# Patient Record
Sex: FEMALE | Race: WHITE | NOT HISPANIC OR LATINO | URBAN - METROPOLITAN AREA
[De-identification: names, ages, dates, MRNs, and addresses within clinical notes are randomized per-mention and may not be internally consistent; named-entity substitution may affect disease eponyms.]

---

## 2022-05-16 ENCOUNTER — OUTPATIENT (OUTPATIENT)
Dept: OUTPATIENT SERVICES | Facility: HOSPITAL | Age: 77
LOS: 1 days | Discharge: HOME | End: 2022-05-16
Payer: MEDICARE

## 2022-05-16 DIAGNOSIS — N13.9 OBSTRUCTIVE AND REFLUX UROPATHY, UNSPECIFIED: ICD-10-CM

## 2022-05-16 PROCEDURE — 76770 US EXAM ABDO BACK WALL COMP: CPT | Mod: 26

## 2023-01-06 ENCOUNTER — INPATIENT (INPATIENT)
Facility: HOSPITAL | Age: 78
LOS: 19 days | Discharge: NURSING, REHAB OR LONG-TERM CARE | End: 2023-01-26
Attending: STUDENT IN AN ORGANIZED HEALTH CARE EDUCATION/TRAINING PROGRAM | Admitting: STUDENT IN AN ORGANIZED HEALTH CARE EDUCATION/TRAINING PROGRAM
Payer: MEDICARE

## 2023-01-06 VITALS
SYSTOLIC BLOOD PRESSURE: 114 MMHG | HEART RATE: 141 BPM | TEMPERATURE: 98 F | OXYGEN SATURATION: 99 % | RESPIRATION RATE: 18 BRPM | DIASTOLIC BLOOD PRESSURE: 73 MMHG

## 2023-01-06 LAB
ALBUMIN SERPL ELPH-MCNC: 4.1 G/DL — SIGNIFICANT CHANGE UP (ref 3.5–5.2)
ALBUMIN SERPL ELPH-MCNC: 4.4 G/DL — SIGNIFICANT CHANGE UP (ref 3.5–5.2)
ALP SERPL-CCNC: 129 U/L — HIGH (ref 30–115)
ALP SERPL-CCNC: 140 U/L — HIGH (ref 30–115)
ALT FLD-CCNC: 35 U/L — SIGNIFICANT CHANGE UP (ref 0–41)
ALT FLD-CCNC: 37 U/L — SIGNIFICANT CHANGE UP (ref 0–41)
ANION GAP SERPL CALC-SCNC: 10 MMOL/L — SIGNIFICANT CHANGE UP (ref 7–14)
ANION GAP SERPL CALC-SCNC: 11 MMOL/L — SIGNIFICANT CHANGE UP (ref 7–14)
AST SERPL-CCNC: 34 U/L — SIGNIFICANT CHANGE UP (ref 0–41)
AST SERPL-CCNC: 75 U/L — HIGH (ref 0–41)
BASE EXCESS BLDV CALC-SCNC: -4.8 MMOL/L — LOW (ref -2–3)
BASOPHILS # BLD AUTO: 0.06 K/UL — SIGNIFICANT CHANGE UP (ref 0–0.2)
BASOPHILS NFR BLD AUTO: 0.7 % — SIGNIFICANT CHANGE UP (ref 0–1)
BILIRUB SERPL-MCNC: 0.6 MG/DL — SIGNIFICANT CHANGE UP (ref 0.2–1.2)
BILIRUB SERPL-MCNC: 0.7 MG/DL — SIGNIFICANT CHANGE UP (ref 0.2–1.2)
BUN SERPL-MCNC: 31 MG/DL — HIGH (ref 10–20)
BUN SERPL-MCNC: 32 MG/DL — HIGH (ref 10–20)
CA-I SERPL-SCNC: 1.19 MMOL/L — SIGNIFICANT CHANGE UP (ref 1.15–1.33)
CALCIUM SERPL-MCNC: 9.2 MG/DL — SIGNIFICANT CHANGE UP (ref 8.4–10.4)
CALCIUM SERPL-MCNC: 9.4 MG/DL — SIGNIFICANT CHANGE UP (ref 8.4–10.5)
CHLORIDE SERPL-SCNC: 102 MMOL/L — SIGNIFICANT CHANGE UP (ref 98–110)
CHLORIDE SERPL-SCNC: 102 MMOL/L — SIGNIFICANT CHANGE UP (ref 98–110)
CO2 SERPL-SCNC: 20 MMOL/L — SIGNIFICANT CHANGE UP (ref 17–32)
CO2 SERPL-SCNC: 22 MMOL/L — SIGNIFICANT CHANGE UP (ref 17–32)
CREAT SERPL-MCNC: 1.2 MG/DL — SIGNIFICANT CHANGE UP (ref 0.7–1.5)
CREAT SERPL-MCNC: 1.2 MG/DL — SIGNIFICANT CHANGE UP (ref 0.7–1.5)
EGFR: 47 ML/MIN/1.73M2 — LOW
EGFR: 47 ML/MIN/1.73M2 — LOW
EOSINOPHIL # BLD AUTO: 0.1 K/UL — SIGNIFICANT CHANGE UP (ref 0–0.7)
EOSINOPHIL NFR BLD AUTO: 1.1 % — SIGNIFICANT CHANGE UP (ref 0–8)
GAS PNL BLDV: 129 MMOL/L — LOW (ref 136–145)
GAS PNL BLDV: SIGNIFICANT CHANGE UP
GAS PNL BLDV: SIGNIFICANT CHANGE UP
GLUCOSE SERPL-MCNC: 118 MG/DL — HIGH (ref 70–99)
GLUCOSE SERPL-MCNC: 120 MG/DL — HIGH (ref 70–99)
HCO3 BLDV-SCNC: 22 MMOL/L — SIGNIFICANT CHANGE UP (ref 22–29)
HCT VFR BLD CALC: 44.9 % — SIGNIFICANT CHANGE UP (ref 37–47)
HCT VFR BLDA CALC: 45 % — SIGNIFICANT CHANGE UP (ref 39–51)
HGB BLD CALC-MCNC: 15.1 G/DL — SIGNIFICANT CHANGE UP (ref 12.6–17.4)
HGB BLD-MCNC: 14.5 G/DL — SIGNIFICANT CHANGE UP (ref 12–16)
IMM GRANULOCYTES NFR BLD AUTO: 0.3 % — SIGNIFICANT CHANGE UP (ref 0.1–0.3)
LACTATE BLDV-MCNC: 2 MMOL/L — SIGNIFICANT CHANGE UP (ref 0.5–2)
LYMPHOCYTES # BLD AUTO: 1.2 K/UL — SIGNIFICANT CHANGE UP (ref 1.2–3.4)
LYMPHOCYTES # BLD AUTO: 13.1 % — LOW (ref 20.5–51.1)
MAGNESIUM SERPL-MCNC: 2.1 MG/DL — SIGNIFICANT CHANGE UP (ref 1.8–2.4)
MCHC RBC-ENTMCNC: 28.9 PG — SIGNIFICANT CHANGE UP (ref 27–31)
MCHC RBC-ENTMCNC: 32.3 G/DL — SIGNIFICANT CHANGE UP (ref 32–37)
MCV RBC AUTO: 89.6 FL — SIGNIFICANT CHANGE UP (ref 81–99)
MONOCYTES # BLD AUTO: 1.25 K/UL — HIGH (ref 0.1–0.6)
MONOCYTES NFR BLD AUTO: 13.6 % — HIGH (ref 1.7–9.3)
NEUTROPHILS # BLD AUTO: 6.52 K/UL — HIGH (ref 1.4–6.5)
NEUTROPHILS NFR BLD AUTO: 71.2 % — SIGNIFICANT CHANGE UP (ref 42.2–75.2)
NRBC # BLD: 0 /100 WBCS — SIGNIFICANT CHANGE UP (ref 0–0)
NT-PROBNP SERPL-SCNC: 5671 PG/ML — HIGH (ref 0–300)
PCO2 BLDV: 44 MMHG — HIGH (ref 39–42)
PH BLDV: 7.3 — LOW (ref 7.32–7.43)
PLATELET # BLD AUTO: 220 K/UL — SIGNIFICANT CHANGE UP (ref 130–400)
PO2 BLDV: 37 MMHG — SIGNIFICANT CHANGE UP
POTASSIUM BLDV-SCNC: 7.2 MMOL/L — CRITICAL HIGH (ref 3.5–5.1)
POTASSIUM SERPL-MCNC: 4.2 MMOL/L — SIGNIFICANT CHANGE UP (ref 3.5–5)
POTASSIUM SERPL-MCNC: SIGNIFICANT CHANGE UP MMOL/L (ref 3.5–5)
POTASSIUM SERPL-SCNC: 4.2 MMOL/L — SIGNIFICANT CHANGE UP (ref 3.5–5)
POTASSIUM SERPL-SCNC: SIGNIFICANT CHANGE UP MMOL/L (ref 3.5–5)
PROT SERPL-MCNC: 6.8 G/DL — SIGNIFICANT CHANGE UP (ref 6–8)
PROT SERPL-MCNC: 7.2 G/DL — SIGNIFICANT CHANGE UP (ref 6–8)
RBC # BLD: 5.01 M/UL — SIGNIFICANT CHANGE UP (ref 4.2–5.4)
RBC # FLD: 16.1 % — HIGH (ref 11.5–14.5)
SAO2 % BLDV: 58.8 % — SIGNIFICANT CHANGE UP
SARS-COV-2 RNA SPEC QL NAA+PROBE: SIGNIFICANT CHANGE UP
SODIUM SERPL-SCNC: 132 MMOL/L — LOW (ref 135–146)
SODIUM SERPL-SCNC: 135 MMOL/L — SIGNIFICANT CHANGE UP (ref 135–146)
TROPONIN T SERPL-MCNC: <0.01 NG/ML — SIGNIFICANT CHANGE UP
WBC # BLD: 9.16 K/UL — SIGNIFICANT CHANGE UP (ref 4.8–10.8)
WBC # FLD AUTO: 9.16 K/UL — SIGNIFICANT CHANGE UP (ref 4.8–10.8)

## 2023-01-06 PROCEDURE — 93010 ELECTROCARDIOGRAM REPORT: CPT

## 2023-01-06 PROCEDURE — 99223 1ST HOSP IP/OBS HIGH 75: CPT

## 2023-01-06 PROCEDURE — 99285 EMERGENCY DEPT VISIT HI MDM: CPT

## 2023-01-06 PROCEDURE — 93010 ELECTROCARDIOGRAM REPORT: CPT | Mod: 77

## 2023-01-06 PROCEDURE — 71045 X-RAY EXAM CHEST 1 VIEW: CPT | Mod: 26

## 2023-01-06 RX ORDER — LEVETIRACETAM 250 MG/1
500 TABLET, FILM COATED ORAL ONCE
Refills: 0 | Status: DISCONTINUED | OUTPATIENT
Start: 2023-01-06 | End: 2023-01-06

## 2023-01-06 RX ORDER — MAGNESIUM SULFATE 500 MG/ML
2 VIAL (ML) INJECTION ONCE
Refills: 0 | Status: COMPLETED | OUTPATIENT
Start: 2023-01-06 | End: 2023-01-06

## 2023-01-06 RX ORDER — DILTIAZEM HCL 120 MG
20 CAPSULE, EXT RELEASE 24 HR ORAL ONCE
Refills: 0 | Status: COMPLETED | OUTPATIENT
Start: 2023-01-06 | End: 2023-01-06

## 2023-01-06 RX ORDER — CITALOPRAM 10 MG/1
20 TABLET, FILM COATED ORAL DAILY
Refills: 0 | Status: DISCONTINUED | OUTPATIENT
Start: 2023-01-06 | End: 2023-01-26

## 2023-01-06 RX ORDER — LEVETIRACETAM 250 MG/1
500 TABLET, FILM COATED ORAL
Refills: 0 | Status: DISCONTINUED | OUTPATIENT
Start: 2023-01-06 | End: 2023-01-26

## 2023-01-06 RX ORDER — FUROSEMIDE 40 MG
40 TABLET ORAL ONCE
Refills: 0 | Status: COMPLETED | OUTPATIENT
Start: 2023-01-06 | End: 2023-01-06

## 2023-01-06 RX ORDER — METOPROLOL TARTRATE 50 MG
5 TABLET ORAL ONCE
Refills: 0 | Status: COMPLETED | OUTPATIENT
Start: 2023-01-06 | End: 2023-01-06

## 2023-01-06 RX ORDER — CEFTRIAXONE 500 MG/1
1000 INJECTION, POWDER, FOR SOLUTION INTRAMUSCULAR; INTRAVENOUS ONCE
Refills: 0 | Status: COMPLETED | OUTPATIENT
Start: 2023-01-06 | End: 2023-01-06

## 2023-01-06 RX ORDER — AZITHROMYCIN 500 MG/1
500 TABLET, FILM COATED ORAL ONCE
Refills: 0 | Status: COMPLETED | OUTPATIENT
Start: 2023-01-06 | End: 2023-01-06

## 2023-01-06 RX ADMIN — CEFTRIAXONE 100 MILLIGRAM(S): 500 INJECTION, POWDER, FOR SOLUTION INTRAMUSCULAR; INTRAVENOUS at 16:34

## 2023-01-06 RX ADMIN — Medication 25 GRAM(S): at 17:47

## 2023-01-06 RX ADMIN — Medication 20 MILLIGRAM(S): at 17:46

## 2023-01-06 RX ADMIN — Medication 40 MILLIGRAM(S): at 15:35

## 2023-01-06 RX ADMIN — AZITHROMYCIN 255 MILLIGRAM(S): 500 TABLET, FILM COATED ORAL at 16:34

## 2023-01-06 NOTE — ED PROVIDER NOTE - OBJECTIVE STATEMENT
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 Denies fevers, chills, chest pain, abdominal pain, headache, dizziness, nausea, vomiting. Patient was seen by PMD today and send to ED for new onset afib.

## 2023-01-06 NOTE — ED PROVIDER NOTE - CLINICAL SUMMARY MEDICAL DECISION MAKING FREE TEXT BOX
77-year-old female past medical history of CVA, aphasic at baseline cannot provide detailed history, seizure prophylaxis (on phenytoin and Keppra), high blood pressure presents with generalized weakness over the past 4 days associated with shortness of breath and lower extremity bilateral worsening edema.  Per family at bedside on Tuesday approximately 4 days ago they went to check up on the patient as she was having nonbloody diarrhea and noticed she was short of breath spoke to the primary care doctor that they followed up with today and had an EKG done that showed new onset atrial fibrillation and was sent to the emergency department.  Unable to obtain detailed ROS from patient due to history of CVA.    Vital Signs: I have reviewed the initial vital signs. Constitutional: Non toxic appearing pt sitting on stretcher speaking full sentences. Integumentary: No rash. ENT: MMM NECK: Supple, non-tender, no meningeal signs. Cardiovascular: Irregular, irregular, radial pulses 2/4 b/l. (+) JVD. Respiratory: BS present b/l decreased to R lower lung base, crackles present b/l worse to lung bases,  no accessory muscle use, no stridor. Saturation 99/100 on RA, placed on NC for relief as pt reports SOB. Gastrointestinal: BS present throughout all 4 quadrants, soft, nd, nt, no rebound tenderness or guarding, no cvat. Musculoskeletal: FROM, 2+ pitting edema, no calf pain/erythema. Neurologic: Awake and alert, no acute focal deficits.    Labs and EKG were ordered and reviewed.  Imaging was ordered and reviewed by me.  fluid overload on xray with clinical signs of LE edema, lasix given, in addition concern for RLL infiltrate on cxr, iv abx given. Appropriate medications for patient's presenting complaints were ordered and effects were reassessed. Treated for atrial fibrillation as well, underlying causes addressed, HR continued to be in 120-130's rapid afib, Cardizem given with response. Patient with no previous records (prior hospital, ED visit).  Additional history was obtained from family.  Escalation to admission/observation was considered. Patient requires inpatient hospitalization - monitored setting.

## 2023-01-06 NOTE — H&P ADULT - HISTORY OF PRESENT ILLNESS
77-year-old female past medical history of CVA, aphasic at baseline cannot provide detailed history, seizure prophylaxis (on phenytoin and Keppra), high blood pressure presents with generalized weakness over the past 4 days associated with shortness of breath and lower extremity bilateral worsening edema.  Per family at bedside on Tuesday approximately 4 days ago they went to check up on the patient as she was having nonbloody diarrhea and noticed she was short of breath spoke to the primary care doctor that they followed up with today and had an EKG done that showed new onset atrial fibrillation and was sent to the emergency department.  Unable to obtain detailed ROS from patient due to history of CVA.    ED vitals:  /73, , Temp 98, satting 99% on 3L NC  Trop neg, BNP 5671  EKG Afib w/ RVR  CXR opacities, effusion    s/p Rocephin, Azithromycin, Lasix 40mg IV x1, Cardizem 20mg IV x1 in the ED.  Admitted for new onset fib. 77-year-old female past medical history of CVA, aphasic at baseline cannot provide detailed history, seizure prophylaxis (on phenytoin and Keppra), high blood pressure presents with generalized weakness over the past 4 days associated with shortness of breath and lower extremity bilateral worsening edema.  Per family at bedside on Tuesday approximately 4 days ago they went to check up on the patient as she was having nonbloody diarrhea and noticed she was short of breath spoke to the primary care doctor that they followed up with today and had an EKG done that showed new onset atrial fibrillation and was sent to the emergency department.     ED vitals:  /73, , Temp 98, satting 99% on 3L NC  Trop neg, BNP 5671  EKG Afib w/ RVR  CXR opacities, effusion    s/p Rocephin, Azithromycin, Lasix 40mg IV x1, Cardizem 20mg IV x1 in the ED.  Admitted for new onset fib.

## 2023-01-06 NOTE — ED PROVIDER NOTE - PHYSICAL EXAMINATION
CONSTITUTIONAL: elderly, ill-appearing  SKIN: warm, dry  HEAD: Normocephalic;   EYES: no conjunctival injection  CARD: S1, S2 normal; Regular rate and rhythm.   RESP: +decreased breath sounds RLL. +tachypneic  ABD: soft ntnd  EXT: + b/l LE pitting edema. moves all extremities  NEURO: Alert, oriented, grossly unremarkable.  PSYCH: Cooperative, appropriate.

## 2023-01-06 NOTE — ED ADULT NURSE NOTE - NSIMPLEMENTINTERV_GEN_ALL_ED
Implemented All Fall with Harm Risk Interventions:  [place] to call system. Call bell, personal items and telephone within reach. Instruct patient to call for assistance. Room bathroom lighting operational. Non-slip footwear when patient is off stretcher. Physically safe environment: no spills, clutter or unnecessary equipment. Stretcher in lowest position, wheels locked, appropriate side rails in place. Provide visual cue, wrist band, yellow gown, etc. Monitor gait and stability. Monitor for mental status changes and reorient to person, place, and time. Review medications for side effects contributing to fall risk. Reinforce activity limits and safety measures with patient and family. Provide visual clues: red socks.

## 2023-01-06 NOTE — H&P ADULT - ASSESSMENT
77-year-old female past medical history of CVA, aphasic at baseline cannot provide detailed history, seizure prophylaxis (on phenytoin and Keppra), high blood pressure presents with generalized weakness over the past 4 days associated with shortness of breath and lower extremity bilateral worsening edema. Admitted for new onset Afib.    #CHF exacerbation  - EKG afib w/ RVR  - Trop neg, BNP 5671  - CXR opacities, effusion  - on 2L NC  - Lasix 40mg IV qdaily  - check TTE  - monitor I/O's, daily weight, fluid restriction  - cardiology eval    #New onset Afib  - EKG Afib w/ RVR  - s/p diltiazem IV x1, rate uncontrolled  - Lopressor 5mg IV now, transition to metoprolol PO  - full AC    #Seizures  - c/w Keppra 500mg BID, phenytoin 100mg TID    #Anxiety/Depression  - c/w citalopram 20mg daily    #HTN  - previously on cadesartan/HCTZ, labetalol, hydralazine, has not recently been on meds  - monitor BP, start antihypertensives as indicated    Misc:  DVT ppx: lovenox BID  GI ppx: PPI  Diet: DASH/TLC  Activity: IAT  Code: Full Code  Dispo: Acute, tele

## 2023-01-06 NOTE — ED PROVIDER NOTE - ATTENDING CONTRIBUTION TO CARE
77-year-old female past medical history of CVA, aphasic at baseline cannot provide detailed history, seizure prophylaxis (on phenytoin and Keppra), high blood pressure presents with generalized weakness over the past 4 days associated with shortness of breath and lower extremity bilateral worsening edema.  Per family at bedside on Tuesday approximately 4 days ago they went to check up on the patient as she was having nonbloody diarrhea and noticed she was short of breath spoke to the primary care doctor that they followed up with today and had an EKG done that showed new onset atrial fibrillation and was sent to the emergency department.  Unable to obtain detailed ROS from patient due to history of CVA.    Vital Signs: I have reviewed the initial vital signs. Constitutional: Non toxic appearing pt sitting on stretcher speaking full sentences. Integumentary: No rash. ENT: MMM NECK: Supple, non-tender, no meningeal signs. Cardiovascular: Irregular, irregular, radial pulses 2/4 b/l. (+) JVD. Respiratory: BS present b/l, crackles present b/l worse to lung bases,  no accessory muscle use, no stridor. Saturation 99/100 on RA, placed on NC for relief as pt reports SOB. Gastrointestinal: BS present throughout all 4 quadrants, soft, nd, nt, no rebound tenderness or guarding, no cvat. Musculoskeletal: FROM, 2+ pitting edema, no calf pain/erythema. Neurologic: Awake and alert, no acute focal deficits. 77-year-old female past medical history of CVA, aphasic at baseline cannot provide detailed history, seizure prophylaxis (on phenytoin and Keppra), high blood pressure presents with generalized weakness over the past 4 days associated with shortness of breath and lower extremity bilateral worsening edema.  Per family at bedside on Tuesday approximately 4 days ago they went to check up on the patient as she was having nonbloody diarrhea and noticed she was short of breath spoke to the primary care doctor that they followed up with today and had an EKG done that showed new onset atrial fibrillation and was sent to the emergency department.  Unable to obtain detailed ROS from patient due to history of CVA.    Vital Signs: I have reviewed the initial vital signs. Constitutional: Non toxic appearing pt sitting on stretcher speaking full sentences. Integumentary: No rash. ENT: MMM NECK: Supple, non-tender, no meningeal signs. Cardiovascular: Irregular, irregular, radial pulses 2/4 b/l. (+) JVD. Respiratory: BS present b/l decreased to R lower lung base, crackles present b/l worse to lung bases,  no accessory muscle use, no stridor. Saturation 99/100 on RA, placed on NC for relief as pt reports SOB. Gastrointestinal: BS present throughout all 4 quadrants, soft, nd, nt, no rebound tenderness or guarding, no cvat. Musculoskeletal: FROM, 2+ pitting edema, no calf pain/erythema. Neurologic: Awake and alert, no acute focal deficits.

## 2023-01-06 NOTE — ED PROVIDER NOTE - PROGRESS NOTE DETAILS
ED Attending ROBI Mead noted, given Lasix for concern of chf, R lung base with ? infiltrate, abx also ordered, pt with no recent hospitalizations, covered for CAP, pending rest of results, will reassess.

## 2023-01-06 NOTE — H&P ADULT - NSHPPHYSICALEXAM_GEN_ALL_CORE
GENERAL: NAD, lying in bed comfortably  HEAD:  Atraumatic, normocephalic  EYES: EOMI, PERRLA, conjunctiva and sclera clear  ENT: Moist mucous membranes  NECK: Supple, no JVD  HEART: Regular rate and rhythm, no murmurs, rubs, or gallops  LUNGS: Unlabored respirations. decreased breath sounds RLL  ABDOMEN: Soft, nontender, nondistended, +BS  EXTREMITIES: 2+ peripheral pulses bilaterally. +B/L pitting edema  NERVOUS SYSTEM:  A&Ox3, no focal deficits   SKIN: No rashes or lesions

## 2023-01-06 NOTE — ED PROVIDER NOTE - CARE PLAN
1 Assessment and plan of treatment:	Plan: Monitor, EKG, CXR, labs, meds, reassess.   Principal Discharge DX:	Pneumonia  Assessment and plan of treatment:	Plan: Monitor, EKG, CXR, labs, meds, reassess.  Secondary Diagnosis:	New onset atrial fibrillation  Secondary Diagnosis:	Prolonged QT interval

## 2023-01-06 NOTE — ED PROVIDER NOTE - NS ED ROS FT
GEN:  no fever, no chills, no generalized weakness  NEURO:  no headache, no dizziness  ENT: no sore throat, no runny nose  CV:  no chest pain, no palpitations  RESP:  +sob, no cough  GI:  no nausea, no vomiting, no abdominal pain, no diarrhea  :  no dysuria, no urinary frequency, no hematuria  MSK:  no joint pain, no edema  SKIN:  no rash, no bruising

## 2023-01-06 NOTE — H&P ADULT - ATTENDING COMMENTS
76 YO F w/ a PMH of CVA (residual aphasia), seizure disorder(on phenytoin and Keppra), high blood pressure presents with generalized weakness over the past 4 days associated with shortness of breath and lower extremity bilateral worsening edema.  Per family at bedside on Tuesday approximately 4 days ago they went to check up on the patient as she was having nonbloody diarrhea and noticed she was short of breath spoke to the primary care doctor that they followed up with today and had an EKG done that showed new onset atrial fibrillation and was sent to the emergency department.     ED vitals:  /73, , Temp 98, satting 99% on 3L NC  Trop neg, BNP 5671  EKG Afib w/ RVR  CXR opacities, effusion    s/p Rocephin, Azithromycin, Lasix 40mg IV x1, Cardizem 20mg IV x1 in the ED.  Admitted for new onset fib. 76 YO F w/ a PMH of CVA (residual aphasia), seizure disorder, and HTN who was asked to present to the hospital by her PCP for eval of new-onset Afib on out-opt EKG. Associated with SOB and B/L LE swelling for the past x 4 days. Denies any CP, fevers/chills, cough, sore throat, ABD pain, N/V/D, dysuria, headaches, or rashes. Denies any excess EtOH use. No tobacco or cocaine/crack use.     In the ED, EKG showed Afib w/ RVR (HR 130s). Pt given Diltiazem 20 mg IV push with good relief of HR. Started on IV ABXs (Ceftr/Azithro) in the ED.     FMHx:   -No family Hx of early cardiac death, CAD, asthma, or genetic disorders identified    Physical exam shows pt in NAD. VSS, afebrile, not hypoxic on. A&Ox1 (Name). Non-focal neuro exam. Muscle strength/sensation intact. CTA B/L with no W/C/R. Irregularly irregular, no M/G/R. ABD is soft and non-tender, normoactive BSs. LEs with 2+ pitting edema B/L. No rashes. Labs and radiology as above.    Dyspnea due to paroxysmal atrial fibrillation with RVR, now rate controlled. Tele admit. HD stable. Transition to PO BB prior to discharge. Echo. TSH. A1c and lipids. Serial cardiac enzymes and EKGs. Heparin infusion and transition to PO AC in the AM. CHADsVASC (>2). Cardio consult.    Dyspnea due to acute HF, unknown type. IV Lasix and transition to PO. Echo. Monitor daily weights, Is&Os, and diet/fluid restriction. Restart home meds.     HX of CVA (residual aphasia), seizure disorder, and HTN. Restart home meds, except as stated above. DVT PPX. Inform PCP of pt's admission to hospital. My note supersedes the residents note.     Date seen by [email or web address]

## 2023-01-07 LAB
A1C WITH ESTIMATED AVERAGE GLUCOSE RESULT: 5.3 % — SIGNIFICANT CHANGE UP (ref 4–5.6)
APTT BLD: 27.3 SEC — SIGNIFICANT CHANGE UP (ref 27–39.2)
CHOLEST SERPL-MCNC: 158 MG/DL — SIGNIFICANT CHANGE UP
ESTIMATED AVERAGE GLUCOSE: 105 MG/DL — SIGNIFICANT CHANGE UP (ref 68–114)
HCV AB S/CO SERPL IA: 0.04 COI — SIGNIFICANT CHANGE UP
HCV AB SERPL-IMP: SIGNIFICANT CHANGE UP
HDLC SERPL-MCNC: 72 MG/DL — SIGNIFICANT CHANGE UP
LIPID PNL WITH DIRECT LDL SERPL: 72 MG/DL — SIGNIFICANT CHANGE UP
NON HDL CHOLESTEROL: 86 MG/DL — SIGNIFICANT CHANGE UP
TRIGL SERPL-MCNC: 71 MG/DL — SIGNIFICANT CHANGE UP
TSH SERPL-MCNC: 4.94 UIU/ML — HIGH (ref 0.27–4.2)

## 2023-01-07 PROCEDURE — 99222 1ST HOSP IP/OBS MODERATE 55: CPT

## 2023-01-07 PROCEDURE — 99233 SBSQ HOSP IP/OBS HIGH 50: CPT

## 2023-01-07 RX ORDER — HEPARIN SODIUM 5000 [USP'U]/ML
INJECTION INTRAVENOUS; SUBCUTANEOUS
Qty: 25000 | Refills: 0 | Status: DISCONTINUED | OUTPATIENT
Start: 2023-01-07 | End: 2023-01-07

## 2023-01-07 RX ORDER — HEPARIN SODIUM 5000 [USP'U]/ML
3500 INJECTION INTRAVENOUS; SUBCUTANEOUS EVERY 6 HOURS
Refills: 0 | Status: DISCONTINUED | OUTPATIENT
Start: 2023-01-07 | End: 2023-01-07

## 2023-01-07 RX ORDER — HEPARIN SODIUM 5000 [USP'U]/ML
7000 INJECTION INTRAVENOUS; SUBCUTANEOUS ONCE
Refills: 0 | Status: COMPLETED | OUTPATIENT
Start: 2023-01-07 | End: 2023-01-07

## 2023-01-07 RX ORDER — FUROSEMIDE 40 MG
40 TABLET ORAL DAILY
Refills: 0 | Status: DISCONTINUED | OUTPATIENT
Start: 2023-01-07 | End: 2023-01-12

## 2023-01-07 RX ORDER — ENOXAPARIN SODIUM 100 MG/ML
90 INJECTION SUBCUTANEOUS EVERY 12 HOURS
Refills: 0 | Status: DISCONTINUED | OUTPATIENT
Start: 2023-01-07 | End: 2023-01-20

## 2023-01-07 RX ORDER — DILTIAZEM HCL 120 MG
5 CAPSULE, EXT RELEASE 24 HR ORAL
Qty: 125 | Refills: 0 | Status: DISCONTINUED | OUTPATIENT
Start: 2023-01-07 | End: 2023-01-07

## 2023-01-07 RX ORDER — HEPARIN SODIUM 5000 [USP'U]/ML
7000 INJECTION INTRAVENOUS; SUBCUTANEOUS EVERY 6 HOURS
Refills: 0 | Status: DISCONTINUED | OUTPATIENT
Start: 2023-01-07 | End: 2023-01-07

## 2023-01-07 RX ADMIN — CITALOPRAM 20 MILLIGRAM(S): 10 TABLET, FILM COATED ORAL at 13:44

## 2023-01-07 RX ADMIN — Medication 100 MILLIGRAM(S): at 22:39

## 2023-01-07 RX ADMIN — Medication 100 MILLIGRAM(S): at 06:03

## 2023-01-07 RX ADMIN — Medication 100 MILLIGRAM(S): at 13:45

## 2023-01-07 RX ADMIN — ENOXAPARIN SODIUM 90 MILLIGRAM(S): 100 INJECTION SUBCUTANEOUS at 17:20

## 2023-01-07 RX ADMIN — LEVETIRACETAM 500 MILLIGRAM(S): 250 TABLET, FILM COATED ORAL at 17:21

## 2023-01-07 RX ADMIN — HEPARIN SODIUM 7000 UNIT(S): 5000 INJECTION INTRAVENOUS; SUBCUTANEOUS at 06:04

## 2023-01-07 RX ADMIN — Medication 40 MILLIGRAM(S): at 13:44

## 2023-01-07 RX ADMIN — HEPARIN SODIUM 1600 UNIT(S)/HR: 5000 INJECTION INTRAVENOUS; SUBCUTANEOUS at 06:04

## 2023-01-07 RX ADMIN — LEVETIRACETAM 500 MILLIGRAM(S): 250 TABLET, FILM COATED ORAL at 06:03

## 2023-01-07 NOTE — CONSULT NOTE ADULT - ASSESSMENT
Impression   # A fib with RVR   # ADHF     Recommendations   - Please start heparin for stroke prophylaxis   - Start IV diuresis with close monitoring or urine output   - Check TTE   - Rate reasonable for now. Once more euvolemic start low dose beta blocker   - Based on clinical course we can consider JOSE C cardioversion     This a preliminary plan. Will need to discuss with attending.   Signature: Kaela BENZ, 1st year Cardiology Fellow   Impression   # A fib with RVR   # ADHF     Recommendations   - Please start Eliquis 5 MG BID for stroke prophylaxis   - Start IV diuresis with close monitoring or urine output   - Check TTE   - Rate reasonable for now. Once more euvolemic start low dose beta blocker   - Based on clinical course we can consider JOSE C cardioversion     This a preliminary plan. Will need to discuss with attending.   Signature: Kaela BENZ, 1st year Cardiology Fellow

## 2023-01-07 NOTE — PROGRESS NOTE ADULT - ASSESSMENT
77-year-old female past medical history of CVA, aphasic at baseline cannot provide detailed history, seizure prophylaxis (on phenytoin and Keppra), high blood pressure presents with generalized weakness over the past 4 days associated with shortness of breath and lower extremity bilateral worsening edema. Admitted for new onset Afib.    #CHF exacerbation  #New onset Afib w/ RVR  - Trop neg, BNP 5671  - CXR opacities, effusion  - bibasal crackles on exam.   - Lasix 40mg IV qdaily. STrict I/O.   - O2 NC to maintain sats   - check TTE ***    #New onset Afib w/ RVR  - s/p diltiazem 20 IV x1, Metoprolol 5 x 1   - rate at goal now. If rate remains at goal (<110), can defer beta blockers given the acute CHF exacerbation. If HR goes high, start PO Metoprolol 25 BID  - full AC. Change hep gtt to Lovenox from 1/7 6PM.   - Cardio planning JOSE C/DCCV on monday. Hold Lovenox 12 hours prior procedure. NPO after sunday midnight.     #Seizures  - c/w Keppra 500mg BID, phenytoin 100mg TID    #Anxiety/Depression  - c/w citalopram 20mg daily    #HTN  - previously on cadesartan/HCTZ, labetalol, hydralazine, has not recently been on meds  - monitor BP, start antihypertensives as indicated    Misc:  DVT ppx: lovenox BID  GI ppx: PPI  Diet: DASH/TLC  Activity: IAT  Code: Full Code  Dispo: Acute, tele      JOSE C/DCCV on Monday.

## 2023-01-07 NOTE — CONSULT NOTE ADULT - SUBJECTIVE AND OBJECTIVE BOX
Outpt cardiologist:    Reason for Consult:     HPI:  77-year-old female past medical history of CVA, not aphasic at baseline but cannot provide detailed history, seizure prophylaxis (on phenytoin and Keppra), high blood pressure presents with generalized weakness over the past 4 days associated with shortness of breath and lower extremity bilateral worsening edema.  Per family at bedside on Tuesday approximately 4 days ago they went to check up on the patient as she was having nonbloody diarrhea and noticed she was short of breath spoke to the primary care doctor that they followed up with today and had an EKG done that showed new onset atrial fibrillation and was sent to the emergency department.     ED vitals:  /73, , Temp 98, satting 99% on 3L NC  Trop neg, BNP 5671  EKG Afib w/ RVR  CXR opacities, effusion    s/p Rocephin, Azithromycin, Lasix 40mg IV x1, Cardizem 20mg IV x1 in the ED.  Admitted for new onset fib. (2023 21:00)        PREVIOUS CARDIAC WORKUP    Risk Factors:      PAST MEDICAL & SURGICAL HISTORY  Hypertension    Aphasia due to acute cerebrovascular accident (CVA)        FAMILY HISTORY:  FAMILY HISTORY:      SOCIAL HISTORY:  Social History:      ALLERGIES:  No Known Allergies      MEDICATIONS:  citalopram 20 milliGRAM(s) Oral daily  heparin  Infusion.  Unit(s)/Hr (16 mL/Hr) IV Continuous <Continuous>  levETIRAcetam 500 milliGRAM(s) Oral two times a day  phenytoin   Capsule 100 milliGRAM(s) Oral three times a day    PRN:  heparin   Injectable 7000 Unit(s) IV Push every 6 hours PRN  heparin   Injectable 3500 Unit(s) IV Push every 6 hours PRN      HOME MEDICATIONS:  Home Medications:  candesartan-hydrochlorothiazide 32 mg-25 mg oral tablet: 1 tab(s) orally once a day (2023 22:59)  citalopram 20 mg oral tablet: 1 tab(s) orally once a day (2023 22:59)  hydrALAZINE 50 mg oral tablet: 1 tab(s) orally 3 times a day (2023 23:00)  Keppra 500 mg oral tablet: 1 tab(s) orally 2 times a day (2023 22:59)  labetalol 300 mg oral tablet: 1 tab(s) orally 2 times a day (2023 22:59)  phenytoin 100 mg oral capsule: 1 tab(s) orally 3 times a day (2023 22:59)      VITALS:   T(F): 99.4 ( @ 15:36), Max: 99.4 ( @ 15:36)  HR: 94 ( @ 07:38) (94 - 141)  BP: 117/55 ( @ 07:38) (101/65 - 168/85)  BP(mean): 85 ( @ 00:00) (85 - 85)  RR: 16 ( @ 07:38) (16 - 18)  SpO2: 100% ( @ 07:38) (99% - 100%)    I&O's Summary    2023 07:01  -  2023 07:00  --------------------------------------------------------  IN: 0 mL / OUT: 300 mL / NET: -300 mL    REVIEW OF SYSTEMS:  CONSTITUTIONAL: No weakness, fevers or chills  HEENT: No visual changes, neck/ear pain  RESPIRATORY: No cough, sob  CARDIOVASCULAR: See HPI  GASTROINTESTINAL: No abdominal pain. No nausea, vomiting, diarrhea   GENITOURINARY: No dysuria, frequency or hematuria  NEUROLOGICAL: No new focal deficits  SKIN: No new rashes    PHYSICAL EXAM:  General: Not in distress.  Non-toxic appearing.   Cardio: irregular, S1, S2, no murmur  Pulm: Bialteral crackles   Abdomen: Soft, non-tender, non-distended. Normoactive bowel sounds  Extremities: 3+ pitting bilateral edema   Neuro: A&O x2. No focal deficits    LABS:                        14.5   9.16  )-----------( 220      ( 2023 15:19 )             44.9         135  |  102  |  31<H>  ----------------------------<  120<H>  4.2   |  22  |  1.2    Ca    9.2      2023 17:03  Mg     2.1         TPro  6.8  /  Alb  4.4  /  TBili  0.7  /  DBili  x   /  AST  34  /  ALT  35  /  AlkPhos  140<H>      PTT - ( 2023 03:40 )  PTT:27.3 sec  Troponin T, Serum: <0.01 ng/mL (23 @ 15:19)    CARDIAC MARKERS ( 2023 15:19 )  x     / <0.01 ng/mL / x     / x     / x            Troponin trend:    Serum Pro-Brain Natriuretic Peptide: 5671 pg/mL (23 @ 15:19)     Chol 158 LDL -- HDL 72 Trig 71  COVID-19 PCR: NotDetec (2023 17:01    EC Lead ECG:   Ventricular Rate 137 BPM    QRS Duration 82 ms    Q-T Interval 332 ms    QTC Calculation(Bazett) 501 ms    R Axis 128 degrees    T Axis -38 degrees    Diagnosis Line Atrial fibrillation with rapid ventricular response  Right axis deviation  Anteroseptal infarct , age undetermined  Abnormal ECG    Confirmed by Artem Maynard ([id number]) on 2023 5:55:38 PM ( @ 14:46)      TELEMETRY EVENTS:   Outpt cardiologist:    Reason for Consult:     HPI:  77-year-old female past medical history of CVA, not aphasic at baseline but cannot provide detailed history, seizure prophylaxis (on phenytoin and Keppra), high blood pressure presents with generalized weakness over the past 4 days associated with shortness of breath and lower extremity bilateral worsening edema.  Per family at bedside on Tuesday approximately 4 days ago they went to check up on the patient as she was having nonbloody diarrhea and noticed she was short of breath spoke to the primary care doctor that they followed up with today and had an EKG done that showed new onset atrial fibrillation and was sent to the emergency department.     ED vitals:  /73, , Temp 98, satting 99% on 3L NC  Trop neg, BNP 5671  EKG Afib w/ RVR  CXR opacities, effusion    s/p Rocephin, Azithromycin, Lasix 40mg IV x1, Cardizem 20mg IV x1 in the ED.  Admitted for new onset fib. (2023 21:00)            PAST MEDICAL & SURGICAL HISTORY  Hypertension    Aphasia due to acute cerebrovascular accident (CVA)        FAMILY HISTORY:  FAMILY HISTORY: no family history of premature CAD or SCD      SOCIAL HISTORY:  Social History: no tobacco use      ALLERGIES:  No Known Allergies      MEDICATIONS:  citalopram 20 milliGRAM(s) Oral daily  heparin  Infusion.  Unit(s)/Hr (16 mL/Hr) IV Continuous <Continuous>  levETIRAcetam 500 milliGRAM(s) Oral two times a day  phenytoin   Capsule 100 milliGRAM(s) Oral three times a day    PRN:  heparin   Injectable 7000 Unit(s) IV Push every 6 hours PRN  heparin   Injectable 3500 Unit(s) IV Push every 6 hours PRN      HOME MEDICATIONS:  Home Medications:  candesartan-hydrochlorothiazide 32 mg-25 mg oral tablet: 1 tab(s) orally once a day (2023 22:59)  citalopram 20 mg oral tablet: 1 tab(s) orally once a day (2023 22:59)  hydrALAZINE 50 mg oral tablet: 1 tab(s) orally 3 times a day (2023 23:00)  Keppra 500 mg oral tablet: 1 tab(s) orally 2 times a day (2023 22:59)  labetalol 300 mg oral tablet: 1 tab(s) orally 2 times a day (2023 22:59)  phenytoin 100 mg oral capsule: 1 tab(s) orally 3 times a day (2023 22:59)      VITALS:   T(F): 99.4 ( @ 15:36), Max: 99.4 ( @ 15:36)  HR: 94 ( @ 07:38) (94 - 141)  BP: 117/55 ( @ 07:38) (101/65 - 168/85)  BP(mean): 85 ( @ 00:00) (85 - 85)  RR: 16 ( @ 07:38) (16 - 18)  SpO2: 100% ( @ 07:38) (99% - 100%)    I&O's Summary    2023 07:01  -  2023 07:00  --------------------------------------------------------  IN: 0 mL / OUT: 300 mL / NET: -300 mL    REVIEW OF SYSTEMS:  CONSTITUTIONAL: No weakness, fevers or chills  HEENT: No visual changes, neck/ear pain  RESPIRATORY: No cough, sob  CARDIOVASCULAR: See HPI  GASTROINTESTINAL: No abdominal pain. No nausea, vomiting, diarrhea   GENITOURINARY: No dysuria, frequency or hematuria  NEUROLOGICAL: No new focal deficits  SKIN: No new rashes  10 point ROS completed; negative except as stated in HPI    PHYSICAL EXAM:  General: Not in distress.  Non-toxic appearing.   HEENT: PERRLA, EOMI  Neck: supple, +JVD  Cardio: irregular, S1, S2, no murmur  Pulm: Bilateral crackles   Abdomen: Soft, non-tender, non-distended. Normoactive bowel sounds  Extremities: 3+ pitting bilateral edema   Neuro: A&O x2. No focal deficits    LABS:                        14.5   9.16  )-----------( 220      ( 2023 15:19 )             44.9         135  |  102  |  31<H>  ----------------------------<  120<H>  4.2   |  22  |  1.2    Ca    9.2      2023 17:03  Mg     2.1         TPro  6.8  /  Alb  4.4  /  TBili  0.7  /  DBili  x   /  AST  34  /  ALT  35  /  AlkPhos  140<H>      PTT - ( 2023 03:40 )  PTT:27.3 sec  Troponin T, Serum: <0.01 ng/mL (23 @ 15:19)    CARDIAC MARKERS ( 2023 15:19 )  x     / <0.01 ng/mL / x     / x     / x            Troponin trend:    Serum Pro-Brain Natriuretic Peptide: 5671 pg/mL (23 @ 15:19)     Chol 158 LDL -- HDL 72 Trig 71  COVID-19 PCR: NotDetec (2023 17:01    EC Lead ECG:   Ventricular Rate 137 BPM    QRS Duration 82 ms    Q-T Interval 332 ms    QTC Calculation(Bazett) 501 ms    R Axis 128 degrees    T Axis -38 degrees    Diagnosis Line Atrial fibrillation with rapid ventricular response  Right axis deviation  Anteroseptal infarct , age undetermined  Abnormal ECG    Confirmed by Artem Maynard ([id number]) on 2023 5:55:38 PM ( @ 14:46)      TELEMETRY EVENTS:

## 2023-01-07 NOTE — PROGRESS NOTE ADULT - SUBJECTIVE AND OBJECTIVE BOX
S: breathing better  mostly in bed      All other pertinent ROS negative.      01-06-23 @ 07:01  -  01-07-23 @ 07:00  --------------------------------------------------------  IN: 0 mL / OUT: 300 mL / NET: -300 mL    01-07-23 @ 07:01  -  01-07-23 @ 22:05  --------------------------------------------------------  IN: 0 mL / OUT: 900 mL / NET: -900 mL      Vital Signs Last 24 Hrs  T(C): 36.2 (07 Jan 2023 16:27), Max: 36.2 (07 Jan 2023 07:38)  T(F): 97.2 (07 Jan 2023 16:27), Max: 97.2 (07 Jan 2023 16:27)  HR: 102 (07 Jan 2023 16:27) (93 - 102)  BP: 137/55 (07 Jan 2023 16:27) (108/75 - 137/55)  BP(mean): 85 (07 Jan 2023 00:00) (85 - 85)  RR: 16 (07 Jan 2023 16:27) (16 - 18)  SpO2: 100% (07 Jan 2023 16:27) (99% - 100%)    Parameters below as of 07 Jan 2023 16:27  Patient On (Oxygen Delivery Method): nasal cannula      PHYSICAL EXAM:    Constitutional: NAD, awake and alert  HEENT: PERR, EOMI, Normal Hearing, MMM  Neck: Soft and supple, No LAD, No JVD  Respiratory: bibasal crackles   Cardiovascular: S1 and S2, regular rate and rhythm, no Murmurs, gallops or rubs  Gastrointestinal: Bowel Sounds present, soft, nontender, nondistended, no guarding, no rebound  Extremities: No peripheral edema      MEDICATIONS:  MEDICATIONS  (STANDING):  citalopram 20 milliGRAM(s) Oral daily  enoxaparin Injectable 90 milliGRAM(s) SubCutaneous every 12 hours  furosemide   Injectable 40 milliGRAM(s) IV Push daily  levETIRAcetam 500 milliGRAM(s) Oral two times a day  phenytoin   Capsule 100 milliGRAM(s) Oral three times a day      LABS: All Labs Reviewed:                        14.5   9.16  )-----------( 220      ( 06 Jan 2023 15:19 )             44.9     01-06    135  |  102  |  31<H>  ----------------------------<  120<H>  4.2   |  22  |  1.2    Ca    9.2      06 Jan 2023 17:03  Mg     2.1     01-06    TPro  6.8  /  Alb  4.4  /  TBili  0.7  /  DBili  x   /  AST  34  /  ALT  35  /  AlkPhos  140<H>  01-06    PTT - ( 07 Jan 2023 03:40 )  PTT:27.3 sec  CARDIAC MARKERS ( 06 Jan 2023 15:19 )  x     / <0.01 ng/mL / x     / x     / x          Blood Culture:     Radiology: reviewed

## 2023-01-07 NOTE — CONSULT NOTE ADULT - ATTENDING COMMENTS
Briefly, 77 year old woman who presented with shortness of breath. Cardiology consulted for atrial fibrillation with RVR, new onset.     1. Afib: rate is well controlled at this time.   - Start eliquis for stroke prophylaxis  - rate control: would add low dose beta blocker when euvolemic  - rhythm control: depending on progression, consider JOSE C/DCCV on MOnday    2. Shortness of breath: concern for acute decompensated heart failure.  - Would check TTE  - Aggressive IV diuresis    Thank you for this consult. Please call/MS teams with questions.

## 2023-01-08 LAB
ALBUMIN SERPL ELPH-MCNC: 3.9 G/DL — SIGNIFICANT CHANGE UP (ref 3.5–5.2)
ALP SERPL-CCNC: 127 U/L — HIGH (ref 30–115)
ALT FLD-CCNC: 34 U/L — SIGNIFICANT CHANGE UP (ref 0–41)
ANION GAP SERPL CALC-SCNC: 14 MMOL/L — SIGNIFICANT CHANGE UP (ref 7–14)
AST SERPL-CCNC: 34 U/L — SIGNIFICANT CHANGE UP (ref 0–41)
BASOPHILS # BLD AUTO: 0.06 K/UL — SIGNIFICANT CHANGE UP (ref 0–0.2)
BASOPHILS NFR BLD AUTO: 1 % — SIGNIFICANT CHANGE UP (ref 0–1)
BILIRUB SERPL-MCNC: 0.6 MG/DL — SIGNIFICANT CHANGE UP (ref 0.2–1.2)
BUN SERPL-MCNC: 24 MG/DL — HIGH (ref 10–20)
CALCIUM SERPL-MCNC: 9.2 MG/DL — SIGNIFICANT CHANGE UP (ref 8.4–10.5)
CHLORIDE SERPL-SCNC: 101 MMOL/L — SIGNIFICANT CHANGE UP (ref 98–110)
CO2 SERPL-SCNC: 25 MMOL/L — SIGNIFICANT CHANGE UP (ref 17–32)
CREAT SERPL-MCNC: 1.2 MG/DL — SIGNIFICANT CHANGE UP (ref 0.7–1.5)
EGFR: 47 ML/MIN/1.73M2 — LOW
EOSINOPHIL # BLD AUTO: 0.17 K/UL — SIGNIFICANT CHANGE UP (ref 0–0.7)
EOSINOPHIL NFR BLD AUTO: 2.7 % — SIGNIFICANT CHANGE UP (ref 0–8)
FOLATE SERPL-MCNC: 8.3 NG/ML — SIGNIFICANT CHANGE UP
GLUCOSE SERPL-MCNC: 93 MG/DL — SIGNIFICANT CHANGE UP (ref 70–99)
HCT VFR BLD CALC: 42.3 % — SIGNIFICANT CHANGE UP (ref 37–47)
HGB BLD-MCNC: 13.6 G/DL — SIGNIFICANT CHANGE UP (ref 12–16)
IMM GRANULOCYTES NFR BLD AUTO: 0.3 % — SIGNIFICANT CHANGE UP (ref 0.1–0.3)
LYMPHOCYTES # BLD AUTO: 1.11 K/UL — LOW (ref 1.2–3.4)
LYMPHOCYTES # BLD AUTO: 17.6 % — LOW (ref 20.5–51.1)
MAGNESIUM SERPL-MCNC: 2.1 MG/DL — SIGNIFICANT CHANGE UP (ref 1.8–2.4)
MCHC RBC-ENTMCNC: 28.6 PG — SIGNIFICANT CHANGE UP (ref 27–31)
MCHC RBC-ENTMCNC: 32.2 G/DL — SIGNIFICANT CHANGE UP (ref 32–37)
MCV RBC AUTO: 88.9 FL — SIGNIFICANT CHANGE UP (ref 81–99)
MONOCYTES # BLD AUTO: 0.96 K/UL — HIGH (ref 0.1–0.6)
MONOCYTES NFR BLD AUTO: 15.3 % — HIGH (ref 1.7–9.3)
NEUTROPHILS # BLD AUTO: 3.97 K/UL — SIGNIFICANT CHANGE UP (ref 1.4–6.5)
NEUTROPHILS NFR BLD AUTO: 63.1 % — SIGNIFICANT CHANGE UP (ref 42.2–75.2)
NRBC # BLD: 0 /100 WBCS — SIGNIFICANT CHANGE UP (ref 0–0)
PHOSPHATE SERPL-MCNC: 3.6 MG/DL — SIGNIFICANT CHANGE UP (ref 2.1–4.9)
PLATELET # BLD AUTO: 180 K/UL — SIGNIFICANT CHANGE UP (ref 130–400)
POTASSIUM SERPL-MCNC: 3.7 MMOL/L — SIGNIFICANT CHANGE UP (ref 3.5–5)
POTASSIUM SERPL-SCNC: 3.7 MMOL/L — SIGNIFICANT CHANGE UP (ref 3.5–5)
PROT SERPL-MCNC: 6 G/DL — SIGNIFICANT CHANGE UP (ref 6–8)
RBC # BLD: 4.76 M/UL — SIGNIFICANT CHANGE UP (ref 4.2–5.4)
RBC # FLD: 15.9 % — HIGH (ref 11.5–14.5)
SODIUM SERPL-SCNC: 140 MMOL/L — SIGNIFICANT CHANGE UP (ref 135–146)
VIT B12 SERPL-MCNC: 798 PG/ML — SIGNIFICANT CHANGE UP (ref 232–1245)
WBC # BLD: 6.29 K/UL — SIGNIFICANT CHANGE UP (ref 4.8–10.8)
WBC # FLD AUTO: 6.29 K/UL — SIGNIFICANT CHANGE UP (ref 4.8–10.8)

## 2023-01-08 PROCEDURE — 93306 TTE W/DOPPLER COMPLETE: CPT | Mod: 26

## 2023-01-08 PROCEDURE — 99233 SBSQ HOSP IP/OBS HIGH 50: CPT

## 2023-01-08 RX ADMIN — Medication 40 MILLIGRAM(S): at 06:44

## 2023-01-08 RX ADMIN — LEVETIRACETAM 500 MILLIGRAM(S): 250 TABLET, FILM COATED ORAL at 17:22

## 2023-01-08 RX ADMIN — LEVETIRACETAM 500 MILLIGRAM(S): 250 TABLET, FILM COATED ORAL at 06:46

## 2023-01-08 RX ADMIN — Medication 100 MILLIGRAM(S): at 21:53

## 2023-01-08 RX ADMIN — CITALOPRAM 20 MILLIGRAM(S): 10 TABLET, FILM COATED ORAL at 11:28

## 2023-01-08 RX ADMIN — Medication 100 MILLIGRAM(S): at 06:45

## 2023-01-08 RX ADMIN — Medication 100 MILLIGRAM(S): at 13:46

## 2023-01-08 RX ADMIN — ENOXAPARIN SODIUM 90 MILLIGRAM(S): 100 INJECTION SUBCUTANEOUS at 06:45

## 2023-01-08 NOTE — PROGRESS NOTE ADULT - ASSESSMENT
77-year-old female past medical history of CVA, aphasic at baseline cannot provide detailed history, seizure prophylaxis (on phenytoin and Keppra), high blood pressure presents with generalized weakness over the past 4 days associated with shortness of breath and lower extremity bilateral worsening edema. Admitted for new onset Afib.    #CHF exacerbation  #New onset Afib w/ RVR  Echo Noted (1/8):    LV Ejection Fraction 28 %. Severely decreased global left ventricular systolic function.   Moderate to severe mitral valve regurgitation. Dilated IVC.    - Trop neg, BNP 5671  - CXR opacities, effusion  - bibasal crackles on exam.   - Lasix 40mg IV qdaily. STrict I/O.   - O2 NC to maintain sats     Will benefit from Lifevest after GDMT. Heart failure consult.     #New onset Afib w/ RVR  - s/p diltiazem 20 IV x1, Metoprolol 5 x 1   - rate at goal now. If rate remains at goal (<110), can defer beta blockers given the acute CHF exacerbation. If HR goes high, start PO Metoprolol 25 BID  - full AC. Changed hep gtt to Lovenox from 1/7 6PM.   - Cardio planning JOSE C/DCCV on monday. Hold Lovenox 12 hours prior procedure. NPO after sunday midnight.     #Seizures  - c/w Keppra 500mg BID, phenytoin 100mg TID    #Anxiety/Depression  - c/w citalopram 20mg daily    #HTN  - previously on cadesartan/HCTZ, labetalol, hydralazine, has not recently been on meds  - monitor BP, start antihypertensives as indicated    Misc:  DVT ppx: lovenox BID  GI ppx: PPI  Diet: DASH/TLC  Activity: IAT  Code: Full Code  Dispo: Acute, tele      JOSE C/DCCV on Monday.

## 2023-01-08 NOTE — PROGRESS NOTE ADULT - SUBJECTIVE AND OBJECTIVE BOX
S: No new events/complaints      All other pertinent ROS negative.      01-07-23 @ 07:01  -  01-08-23 @ 07:00  --------------------------------------------------------  IN: 0 mL / OUT: 900 mL / NET: -900 mL      Vital Signs Last 24 Hrs  T(C): --  T(F): --  HR: 118 (08 Jan 2023 04:00) (105 - 118)  BP: 108/69 (08 Jan 2023 04:00) (108/69 - 120/67)  BP(mean): 83 (08 Jan 2023 04:00) (8 - 83)  RR: 16 (08 Jan 2023 04:00) (16 - 16)  SpO2: 100% (08 Jan 2023 04:00) (100% - 100%)      PHYSICAL EXAM:    Constitutional: NAD, awake and alert  HEENT: PERR, EOMI, Normal Hearing, MMM  Neck: Soft and supple, No LAD, No JVD  Respiratory: occ basal crackles  Cardiovascular: S1 and S2, regular rate and rhythm, no Murmurs, gallops or rubs  Gastrointestinal: Bowel Sounds present, soft, nontender, nondistended, no guarding, no rebound  Extremities: No peripheral edema      MEDICATIONS:  MEDICATIONS  (STANDING):  citalopram 20 milliGRAM(s) Oral daily  enoxaparin Injectable 90 milliGRAM(s) SubCutaneous every 12 hours  furosemide   Injectable 40 milliGRAM(s) IV Push daily  levETIRAcetam 500 milliGRAM(s) Oral two times a day  phenytoin   Capsule 100 milliGRAM(s) Oral three times a day      LABS: All Labs Reviewed:                        13.6   6.29  )-----------( 180      ( 08 Jan 2023 09:02 )             42.3     01-08    140  |  101  |  24<H>  ----------------------------<  93  3.7   |  25  |  1.2    Ca    9.2      08 Jan 2023 09:02  Phos  3.6     01-08  Mg     2.1     01-08    TPro  6.0  /  Alb  3.9  /  TBili  0.6  /  DBili  x   /  AST  34  /  ALT  34  /  AlkPhos  127<H>  01-08    PTT - ( 07 Jan 2023 03:40 )  PTT:27.3 sec      Blood Culture:     Radiology: reviewed

## 2023-01-09 LAB
ALBUMIN SERPL ELPH-MCNC: 3.6 G/DL — SIGNIFICANT CHANGE UP (ref 3.5–5.2)
ALP SERPL-CCNC: 116 U/L — HIGH (ref 30–115)
ALT FLD-CCNC: 27 U/L — SIGNIFICANT CHANGE UP (ref 0–41)
ANION GAP SERPL CALC-SCNC: 13 MMOL/L — SIGNIFICANT CHANGE UP (ref 7–14)
AST SERPL-CCNC: 24 U/L — SIGNIFICANT CHANGE UP (ref 0–41)
BILIRUB SERPL-MCNC: 0.6 MG/DL — SIGNIFICANT CHANGE UP (ref 0.2–1.2)
BUN SERPL-MCNC: 21 MG/DL — HIGH (ref 10–20)
CALCIUM SERPL-MCNC: 8.8 MG/DL — SIGNIFICANT CHANGE UP (ref 8.4–10.5)
CHLORIDE SERPL-SCNC: 101 MMOL/L — SIGNIFICANT CHANGE UP (ref 98–110)
CO2 SERPL-SCNC: 27 MMOL/L — SIGNIFICANT CHANGE UP (ref 17–32)
CREAT SERPL-MCNC: 1.3 MG/DL — SIGNIFICANT CHANGE UP (ref 0.7–1.5)
EGFR: 43 ML/MIN/1.73M2 — LOW
GLUCOSE SERPL-MCNC: 93 MG/DL — SIGNIFICANT CHANGE UP (ref 70–99)
HCT VFR BLD CALC: 38.4 % — SIGNIFICANT CHANGE UP (ref 37–47)
HGB BLD-MCNC: 12.6 G/DL — SIGNIFICANT CHANGE UP (ref 12–16)
MCHC RBC-ENTMCNC: 28.6 PG — SIGNIFICANT CHANGE UP (ref 27–31)
MCHC RBC-ENTMCNC: 32.8 G/DL — SIGNIFICANT CHANGE UP (ref 32–37)
MCV RBC AUTO: 87.1 FL — SIGNIFICANT CHANGE UP (ref 81–99)
NRBC # BLD: 0 /100 WBCS — SIGNIFICANT CHANGE UP (ref 0–0)
PLATELET # BLD AUTO: 172 K/UL — SIGNIFICANT CHANGE UP (ref 130–400)
POTASSIUM SERPL-MCNC: 3 MMOL/L — LOW (ref 3.5–5)
POTASSIUM SERPL-SCNC: 3 MMOL/L — LOW (ref 3.5–5)
PROT SERPL-MCNC: 5.6 G/DL — LOW (ref 6–8)
RBC # BLD: 4.41 M/UL — SIGNIFICANT CHANGE UP (ref 4.2–5.4)
RBC # FLD: 15.6 % — HIGH (ref 11.5–14.5)
SODIUM SERPL-SCNC: 141 MMOL/L — SIGNIFICANT CHANGE UP (ref 135–146)
TSH SERPL-MCNC: 3.72 UIU/ML — SIGNIFICANT CHANGE UP (ref 0.27–4.2)
WBC # BLD: 5.09 K/UL — SIGNIFICANT CHANGE UP (ref 4.8–10.8)
WBC # FLD AUTO: 5.09 K/UL — SIGNIFICANT CHANGE UP (ref 4.8–10.8)

## 2023-01-09 PROCEDURE — 99232 SBSQ HOSP IP/OBS MODERATE 35: CPT

## 2023-01-09 PROCEDURE — 71045 X-RAY EXAM CHEST 1 VIEW: CPT | Mod: 26

## 2023-01-09 PROCEDURE — 99233 SBSQ HOSP IP/OBS HIGH 50: CPT

## 2023-01-09 RX ORDER — POTASSIUM CHLORIDE 20 MEQ
40 PACKET (EA) ORAL ONCE
Refills: 0 | Status: COMPLETED | OUTPATIENT
Start: 2023-01-09 | End: 2023-01-09

## 2023-01-09 RX ORDER — LOSARTAN POTASSIUM 100 MG/1
25 TABLET, FILM COATED ORAL DAILY
Refills: 0 | Status: DISCONTINUED | OUTPATIENT
Start: 2023-01-09 | End: 2023-01-15

## 2023-01-09 RX ORDER — INFLUENZA VIRUS VACCINE 15; 15; 15; 15 UG/.5ML; UG/.5ML; UG/.5ML; UG/.5ML
0.7 SUSPENSION INTRAMUSCULAR ONCE
Refills: 0 | Status: DISCONTINUED | OUTPATIENT
Start: 2023-01-09 | End: 2023-01-26

## 2023-01-09 RX ORDER — METOPROLOL TARTRATE 50 MG
25 TABLET ORAL
Refills: 0 | Status: DISCONTINUED | OUTPATIENT
Start: 2023-01-09 | End: 2023-01-10

## 2023-01-09 RX ADMIN — CITALOPRAM 20 MILLIGRAM(S): 10 TABLET, FILM COATED ORAL at 12:04

## 2023-01-09 RX ADMIN — Medication 100 MILLIGRAM(S): at 22:04

## 2023-01-09 RX ADMIN — Medication 40 MILLIGRAM(S): at 06:12

## 2023-01-09 RX ADMIN — Medication 25 MILLIGRAM(S): at 17:07

## 2023-01-09 RX ADMIN — LOSARTAN POTASSIUM 25 MILLIGRAM(S): 100 TABLET, FILM COATED ORAL at 14:01

## 2023-01-09 RX ADMIN — Medication 40 MILLIEQUIVALENT(S): at 14:01

## 2023-01-09 RX ADMIN — LEVETIRACETAM 500 MILLIGRAM(S): 250 TABLET, FILM COATED ORAL at 17:07

## 2023-01-09 RX ADMIN — LEVETIRACETAM 500 MILLIGRAM(S): 250 TABLET, FILM COATED ORAL at 06:12

## 2023-01-09 RX ADMIN — Medication 100 MILLIGRAM(S): at 14:01

## 2023-01-09 RX ADMIN — Medication 100 MILLIGRAM(S): at 06:12

## 2023-01-09 NOTE — PROGRESS NOTE ADULT - ASSESSMENT
IMPRESSION  Acute HFrEF exacerbation  A Fib with RVR  CVA  Hx of seizure disorder    RECOMMENDATIONS  c/w telemonitoring  c/w I/V Lasix 40mg qd  start metoprolol 25mg BID  start losartan 25 mg qd, if tolerating consider switching to entresto  c/w Lovenox for A/C. Can switch to Eliquis on discharge  will consider JOSE C/CV once euvolemic. Need HCP/family consent pt is not AAO X 3  cardiomyopathy is likely secondary to tachycardia, need more information about baseline functional status. Could consider outpatient ischemic w/up.  IMPRESSION  Acute HFrEF exacerbation  A Fib with RVR  CVA  Hx of seizure disorder    RECOMMENDATIONS  c/w telemonitoring  c/w I/V Lasix 40mg qd  start metoprolol 25mg BID  start losartan 25 mg qd, if tolerating consider switching to entresto  c/w Lovenox for A/C. Can switch to Eliquis on discharge  will consider JOSE C/CV once euvolemic. Need HCP/family consent pt is not AAO X 3  cardiomyopathy is likely secondary to tachycardia, need more information about baseline functional status. Depending on goals of care, will need outpatient ischemia evaluation.

## 2023-01-09 NOTE — PROGRESS NOTE ADULT - SUBJECTIVE AND OBJECTIVE BOX
INTERVAL HPI/OVERNIGHT EVENTS:    SUBJECTIVE: Patient seen and examined at bedside.     cc: sob  no cp, sob, abd pain, fever    OBJECTIVE:    VITAL SIGNS:  Vital Signs Last 24 Hrs  T(C): 36.6 (09 Jan 2023 08:21), Max: 36.6 (09 Jan 2023 08:21)  T(F): 97.9 (09 Jan 2023 08:21), Max: 97.9 (09 Jan 2023 08:21)  HR: 150 (09 Jan 2023 08:21) (111 - 150)  BP: 124/78 (09 Jan 2023 08:21) (116/65 - 124/78)  BP(mean): --  RR: 18 (09 Jan 2023 08:21) (18 - 18)  SpO2: 100% (09 Jan 2023 08:21) (96% - 100%)    Parameters below as of 09 Jan 2023 08:21  Patient On (Oxygen Delivery Method): room air          PHYSICAL EXAM:    General: NAD  HEENT: NC/AT; PERRL, clear conjunctiva  Neck: supple  Respiratory: CTA b/l  Cardiovascular: +S1/S2; RRR  Abdomen: soft, NT/ND; +BS x4  Extremities: WWP, 2+ peripheral pulses b/l; no LE edema  Skin: normal color and turgor; no rash  Neurological:    MEDICATIONS:  MEDICATIONS  (STANDING):  citalopram 20 milliGRAM(s) Oral daily  enoxaparin Injectable 90 milliGRAM(s) SubCutaneous every 12 hours  furosemide   Injectable 40 milliGRAM(s) IV Push daily  levETIRAcetam 500 milliGRAM(s) Oral two times a day  metoprolol tartrate 25 milliGRAM(s) Oral two times a day  phenytoin   Capsule 100 milliGRAM(s) Oral three times a day  potassium chloride    Tablet ER 40 milliEquivalent(s) Oral once    MEDICATIONS  (PRN):      ALLERGIES:  Allergies    No Known Allergies    Intolerances        LABS:                        12.6   5.09  )-----------( 172      ( 09 Jan 2023 06:15 )             38.4     Hemoglobin: 12.6 g/dL (01-09 @ 06:15)  Hemoglobin: 13.6 g/dL (01-08 @ 09:02)  Hemoglobin: 14.5 g/dL (01-06 @ 15:19)    CBC Full  -  ( 09 Jan 2023 06:15 )  WBC Count : 5.09 K/uL  RBC Count : 4.41 M/uL  Hemoglobin : 12.6 g/dL  Hematocrit : 38.4 %  Platelet Count - Automated : 172 K/uL  Mean Cell Volume : 87.1 fL  Mean Cell Hemoglobin : 28.6 pg  Mean Cell Hemoglobin Concentration : 32.8 g/dL  Auto Neutrophil # : x  Auto Lymphocyte # : x  Auto Monocyte # : x  Auto Eosinophil # : x  Auto Basophil # : x  Auto Neutrophil % : x  Auto Lymphocyte % : x  Auto Monocyte % : x  Auto Eosinophil % : x  Auto Basophil % : x    01-09    141  |  101  |  21<H>  ----------------------------<  93  3.0<L>   |  27  |  1.3    Ca    8.8      09 Jan 2023 06:15  Phos  3.6     01-08  Mg     2.1     01-08    TPro  5.6<L>  /  Alb  3.6  /  TBili  0.6  /  DBili  x   /  AST  24  /  ALT  27  /  AlkPhos  116<H>  01-09    Creatinine Trend: 1.3<--, 1.2<--, 1.2<--, 1.2<--  LIVER FUNCTIONS - ( 09 Jan 2023 06:15 )  Alb: 3.6 g/dL / Pro: 5.6 g/dL / ALK PHOS: 116 U/L / ALT: 27 U/L / AST: 24 U/L / GGT: x               hs Troponin:              CSF:                      EKG:   MICROBIOLOGY:    IMAGING:      Labs, imaging, EKG personally reviewed    RADIOLOGY & ADDITIONAL TESTS: Reviewed.

## 2023-01-09 NOTE — PROGRESS NOTE ADULT - ASSESSMENT
77F PMHx CVA c/b aphasia, seizure disorder, HTN here with shortness of breath.    #Shortness of breath, presumed due to acute HFrEF  cxr R base opacity, effusion  lasix 40 iv  repeat cxr  no hypoxia, satting well on ra  tte with ef 28%, rv dysfunction; no baseline to compare  f/u cards  #AFib with RVR, new onset  therapeutic lovenox  start lopressor 25 bid  f/u cards for potential cardioversion  tte as above  #CVA  celexa 20  therapeutic lovenox  #Seizure disorder  keppra 500 bid  phenytoin 100 tid  #HTN  outpt f/u  #DVT ppx  lovenox    #Progress Note Handoff:  Pending (specify):  Consults_________, Tests________, Test Results_______, Other____iv diuresis_____  Family discussion: d/w pt at bedside re: treatment plan, primary dx  Disposition: Home___/SNF___/Other________/Unknown at this time___x_____

## 2023-01-09 NOTE — PATIENT PROFILE ADULT - FALL HARM RISK - HARM RISK INTERVENTIONS
Assistance with ambulation/Assistance OOB with selected safe patient handling equipment/Communicate Risk of Fall with Harm to all staff/Discuss with provider need for PT consult/Monitor gait and stability/Reinforce activity limits and safety measures with patient and family/Tailored Fall Risk Interventions/Visual Cue: Yellow wristband and red socks/Bed in lowest position, wheels locked, appropriate side rails in place/Call bell, personal items and telephone in reach/Instruct patient to call for assistance before getting out of bed or chair/Non-slip footwear when patient is out of bed/[place] to call system/Physically safe environment - no spills, clutter or unnecessary equipment/Purposeful Proactive Rounding/Room/bathroom lighting operational, light cord in reach

## 2023-01-09 NOTE — PROGRESS NOTE ADULT - SUBJECTIVE AND OBJECTIVE BOX
CHIEF COMPLAINT:  Patient is a 77y old  Female who presents with a chief complaint of afib (08 Jan 2023 17:07)      INTERVAL HISTORY/OVERNIGHT EVENTS:  no major overnight events.    ======================  MEDICATIONS:  citalopram 20 milliGRAM(s) Oral daily  enoxaparin Injectable 90 milliGRAM(s) SubCutaneous every 12 hours  furosemide   Injectable 40 milliGRAM(s) IV Push daily  levETIRAcetam 500 milliGRAM(s) Oral two times a day  phenytoin   Capsule 100 milliGRAM(s) Oral three times a day      ======================  PHYSICAL EXAMINATION:  GEN:  nad.   HEENT:  eomi. ncat  PULM:  b/l lung sounds   CARD: s1, s2  ABD: +bs. ntnd  EXT:  no new rashes.    NEURO:  AAO X 1. no new focal deficits.   ======================  OBJECTIVE:        VS:  T(F): 97.9 (01-09 @ 08:21), Max: 97.9 (01-09 @ 08:21)  HR: 150 (01-09 @ 08:21) (111 - 150)  BP: 124/78 (01-09 @ 08:21) (116/65 - 124/78)  RR: 18 (01-09 @ 08:21) (18 - 18)  SpO2: 100% (01-09 @ 08:21) (96% - 100%)      I/O:      01-06 @ 07:01  -  01-07 @ 07:00  --------------------------------------------------------  IN: 0 mL / OUT: 300 mL / NET: -300 mL    01-07 @ 07:01  -  01-08 @ 07:00  --------------------------------------------------------  IN: 0 mL / OUT: 900 mL / NET: -900 mL    01-08 @ 07:01  -  01-09 @ 07:00  --------------------------------------------------------  IN: 0 mL / OUT: 1000 mL / NET: -1000 mL        Weight trend:  Weight (kg): 90 (01-06)    ======================    LABS:                          12.6   5.09  )-----------( 172      ( 09 Jan 2023 06:15 )             38.4     01-09    141  |  101  |  21<H>  ----------------------------<  93  3.0<L>   |  27  |  1.3    Ca    8.8      09 Jan 2023 06:15  Phos  3.6     01-08  Mg     2.1     01-08    TPro  5.6<L>  /  Alb  3.6  /  TBili  0.6  /  DBili  x   /  AST  24  /  ALT  27  /  AlkPhos  116<H>  01-09    LIVER FUNCTIONS - ( 09 Jan 2023 06:15 )  Alb: 3.6 g/dL / Pro: 5.6 g/dL / ALK PHOS: 116 U/L / ALT: 27 U/L / AST: 24 U/L / GGT: x                   Serum Pro-Brain Natriuretic Peptide: 5671 pg/mL (01-06)

## 2023-01-10 LAB
ALBUMIN SERPL ELPH-MCNC: 3.5 G/DL — SIGNIFICANT CHANGE UP (ref 3.5–5.2)
ALP SERPL-CCNC: 125 U/L — HIGH (ref 30–115)
ALT FLD-CCNC: 26 U/L — SIGNIFICANT CHANGE UP (ref 0–41)
ANION GAP SERPL CALC-SCNC: 10 MMOL/L — SIGNIFICANT CHANGE UP (ref 7–14)
ANION GAP SERPL CALC-SCNC: 9 MMOL/L — SIGNIFICANT CHANGE UP (ref 7–14)
AST SERPL-CCNC: 26 U/L — SIGNIFICANT CHANGE UP (ref 0–41)
BILIRUB SERPL-MCNC: 0.6 MG/DL — SIGNIFICANT CHANGE UP (ref 0.2–1.2)
BUN SERPL-MCNC: 22 MG/DL — HIGH (ref 10–20)
BUN SERPL-MCNC: 23 MG/DL — HIGH (ref 10–20)
CALCIUM SERPL-MCNC: 8.9 MG/DL — SIGNIFICANT CHANGE UP (ref 8.4–10.5)
CALCIUM SERPL-MCNC: 9.1 MG/DL — SIGNIFICANT CHANGE UP (ref 8.4–10.5)
CHLORIDE SERPL-SCNC: 100 MMOL/L — SIGNIFICANT CHANGE UP (ref 98–110)
CHLORIDE SERPL-SCNC: 97 MMOL/L — LOW (ref 98–110)
CO2 SERPL-SCNC: 29 MMOL/L — SIGNIFICANT CHANGE UP (ref 17–32)
CO2 SERPL-SCNC: 31 MMOL/L — SIGNIFICANT CHANGE UP (ref 17–32)
CREAT SERPL-MCNC: 1.3 MG/DL — SIGNIFICANT CHANGE UP (ref 0.7–1.5)
CREAT SERPL-MCNC: 1.3 MG/DL — SIGNIFICANT CHANGE UP (ref 0.7–1.5)
EGFR: 43 ML/MIN/1.73M2 — LOW
EGFR: 43 ML/MIN/1.73M2 — LOW
GLUCOSE SERPL-MCNC: 128 MG/DL — HIGH (ref 70–99)
GLUCOSE SERPL-MCNC: 93 MG/DL — SIGNIFICANT CHANGE UP (ref 70–99)
HCT VFR BLD CALC: 42.4 % — SIGNIFICANT CHANGE UP (ref 37–47)
HGB BLD-MCNC: 13.8 G/DL — SIGNIFICANT CHANGE UP (ref 12–16)
MAGNESIUM SERPL-MCNC: 1.9 MG/DL — SIGNIFICANT CHANGE UP (ref 1.8–2.4)
MCHC RBC-ENTMCNC: 29 PG — SIGNIFICANT CHANGE UP (ref 27–31)
MCHC RBC-ENTMCNC: 32.5 G/DL — SIGNIFICANT CHANGE UP (ref 32–37)
MCV RBC AUTO: 89.1 FL — SIGNIFICANT CHANGE UP (ref 81–99)
NRBC # BLD: 0 /100 WBCS — SIGNIFICANT CHANGE UP (ref 0–0)
PLATELET # BLD AUTO: 212 K/UL — SIGNIFICANT CHANGE UP (ref 130–400)
POTASSIUM SERPL-MCNC: 3.1 MMOL/L — LOW (ref 3.5–5)
POTASSIUM SERPL-MCNC: 4.3 MMOL/L — SIGNIFICANT CHANGE UP (ref 3.5–5)
POTASSIUM SERPL-SCNC: 3.1 MMOL/L — LOW (ref 3.5–5)
POTASSIUM SERPL-SCNC: 4.3 MMOL/L — SIGNIFICANT CHANGE UP (ref 3.5–5)
PROT SERPL-MCNC: 5.8 G/DL — LOW (ref 6–8)
RBC # BLD: 4.76 M/UL — SIGNIFICANT CHANGE UP (ref 4.2–5.4)
RBC # FLD: 15.4 % — HIGH (ref 11.5–14.5)
SODIUM SERPL-SCNC: 136 MMOL/L — SIGNIFICANT CHANGE UP (ref 135–146)
SODIUM SERPL-SCNC: 140 MMOL/L — SIGNIFICANT CHANGE UP (ref 135–146)
WBC # BLD: 6.19 K/UL — SIGNIFICANT CHANGE UP (ref 4.8–10.8)
WBC # FLD AUTO: 6.19 K/UL — SIGNIFICANT CHANGE UP (ref 4.8–10.8)

## 2023-01-10 PROCEDURE — 99233 SBSQ HOSP IP/OBS HIGH 50: CPT

## 2023-01-10 RX ORDER — SPIRONOLACTONE 25 MG/1
25 TABLET, FILM COATED ORAL DAILY
Refills: 0 | Status: DISCONTINUED | OUTPATIENT
Start: 2023-01-11 | End: 2023-01-16

## 2023-01-10 RX ORDER — METOPROLOL TARTRATE 50 MG
50 TABLET ORAL
Refills: 0 | Status: DISCONTINUED | OUTPATIENT
Start: 2023-01-10 | End: 2023-01-12

## 2023-01-10 RX ORDER — FUROSEMIDE 40 MG
40 TABLET ORAL ONCE
Refills: 0 | Status: COMPLETED | OUTPATIENT
Start: 2023-01-10 | End: 2023-01-10

## 2023-01-10 RX ADMIN — Medication 100 MILLIGRAM(S): at 05:36

## 2023-01-10 RX ADMIN — Medication 50 MILLIGRAM(S): at 17:22

## 2023-01-10 RX ADMIN — Medication 25 MILLIGRAM(S): at 05:36

## 2023-01-10 RX ADMIN — LEVETIRACETAM 500 MILLIGRAM(S): 250 TABLET, FILM COATED ORAL at 17:23

## 2023-01-10 RX ADMIN — LEVETIRACETAM 500 MILLIGRAM(S): 250 TABLET, FILM COATED ORAL at 05:37

## 2023-01-10 RX ADMIN — ENOXAPARIN SODIUM 90 MILLIGRAM(S): 100 INJECTION SUBCUTANEOUS at 05:35

## 2023-01-10 RX ADMIN — Medication 40 MILLIGRAM(S): at 05:35

## 2023-01-10 RX ADMIN — ENOXAPARIN SODIUM 90 MILLIGRAM(S): 100 INJECTION SUBCUTANEOUS at 17:23

## 2023-01-10 RX ADMIN — CITALOPRAM 20 MILLIGRAM(S): 10 TABLET, FILM COATED ORAL at 12:10

## 2023-01-10 RX ADMIN — Medication 100 MILLIGRAM(S): at 12:11

## 2023-01-10 RX ADMIN — LOSARTAN POTASSIUM 25 MILLIGRAM(S): 100 TABLET, FILM COATED ORAL at 05:36

## 2023-01-10 RX ADMIN — Medication 40 MILLIGRAM(S): at 17:22

## 2023-01-10 RX ADMIN — Medication 100 MILLIGRAM(S): at 21:59

## 2023-01-10 NOTE — PROGRESS NOTE ADULT - ASSESSMENT
77F PMHx CVA, seizure disorder, HTN here with shortness of breath.    A/P:   Acute HFrEF: new diagnosis.   SOB, leg edema.   CXR   Echo showed LVEF 28%, severe reduced RV systolic function, mod to severe MR, mild to mod AR, mild TR, mod pulmonary HTN,    Continue Lasix 40mg IV, will give extra dose today, add Aldactone 25mg daily.   Continue Metoprolol and Losartan.  Per Cardiology cardiomyopathy is likely from tachycardia   Low sodium diet,     New Paroxysmal Atrial Fibrillation with Rapid Ventricular Response:   HR is not well controlled.   Echo showed severe biatrial enlargement.   Increase Metoprolol to 50mg BID, Continue Lovenox, plan for JOSE C and cardioversion once hypervolemia improve.     History of CVA  Family Reports expressive aphasia, Not on ASA or Statin ,     Seizure disorder  Continue Keppra 500 bid and phenytoin 100 tid      DVT Prophylaxis: Lovenox  GI Prophylaxis: None  Code Status: Full    #Progress Note Handoff:  Pending (specify):  Cardiology follow up, improving AFIB, volume overload.   Family discussion: with her brother, update about diuresis, CHF.   Disposition: Home.

## 2023-01-10 NOTE — PROGRESS NOTE ADULT - SUBJECTIVE AND OBJECTIVE BOX
HOUSTON KING 77y Female  MRN#: [id number]   Hospital Day: 4d    SUBJECTIVE  Patient is a 77y old Female who presents with a chief complaint of afib (09 Jan 2023 12:31)  Currently admitted to medicine with the primary diagnosis of Pneumonia      INTERVAL HPI AND OVERNIGHT EVENTS:  Patient was examined and seen at bedside. This morning she is resting comfortably in bed and reports no issues or overnight events.    REVIEW OF SYMPTOMS:  Denies all.    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)      ALLERGIES:  No Known Allergies    MEDICATIONS:  STANDING MEDICATIONS  citalopram 20 milliGRAM(s) Oral daily  enoxaparin Injectable 90 milliGRAM(s) SubCutaneous every 12 hours  furosemide   Injectable 40 milliGRAM(s) IV Push daily  furosemide   Injectable 40 milliGRAM(s) IV Push once  influenza  Vaccine (HIGH DOSE) 0.7 milliLiter(s) IntraMuscular once  levETIRAcetam 500 milliGRAM(s) Oral two times a day  losartan 25 milliGRAM(s) Oral daily  metoprolol tartrate 50 milliGRAM(s) Oral two times a day  phenytoin   Capsule 100 milliGRAM(s) Oral three times a day    PRN MEDICATIONS      VITAL SIGNS: Last 24 Hours  T(C): 36.2 (10 Jarocho 2023 05:13), Max: 36.5 (09 Jan 2023 16:38)  T(F): 97.2 (10 Jarocho 2023 05:13), Max: 97.7 (09 Jan 2023 16:38)  HR: 73 (10 Jarocho 2023 05:13) (73 - 133)  BP: 111/76 (10 Jarocho 2023 05:13) (107/61 - 123/60)  BP(mean): 70 (09 Jan 2023 20:32) (70 - 70)  RR: 18 (10 Jarocho 2023 05:13) (16 - 18)  SpO2: 95% (10 Jarocho 2023 08:57) (95% - 97%)    LABS:                        13.8   6.19  )-----------( 212      ( 10 Jarocho 2023 05:58 )             42.4     01-10    140  |  100  |  22<H>  ----------------------------<  93  4.3   |  31  |  1.3    Ca    9.1      10 Jarocho 2023 05:58  Mg     1.9     01-10    TPro  5.8<L>  /  Alb  3.5  /  TBili  0.6  /  DBili  x   /  AST  26  /  ALT  26  /  AlkPhos  125<H>  01-10      PHYSICAL EXAM:  CONSTITUTIONAL: No acute distress, well-developed, well-groomed, AAOx0  HEAD: Atraumatic, normocephalic  EYES: EOM intact, PERRLA, conjunctiva and sclera clear  ENT: No JVD; moist mucous membranes  PULMONARY: Clear to auscultation bilaterally  CARDIOVASCULAR: Regular rate and rhythm  GASTROINTESTINAL: Soft, non-tender, non-distended  MUSCULOSKELETAL: 2+ peripheral pulses; No lower extremity edema  NEUROLOGY: non-focal  SKIN: No rashes or lesions; warm and dry    ASSESSMENT & PLAN:  77F PMHx CVA c/b aphasia, seizure disorder, HTN here with shortness of breath.    #Shortness of breath, presumed due to acute HFrEF  - cxr R base opacity, effusion  - no hypoxia, satting well on ra  - tte with ef 28%, rv dysfunction; no baseline to compare    Plan  - lasix 40 iv  - repeat cxr  - f/u cards      #AFib with RVR, new onset  - Heart Failure like 2/2 tachycardia  - JOSE C/DCCV planned for today but patient is not yet euvolemic, continue with IV diuresis     Plan:  - therapeutic lovenox  - Increase Lopressor to 50mg bid  - f/u cards for potential cardioversion    #CVA  - celexa 20  - therapeutic lovenox    #Seizure disorder  - keppra 500 bid  - phenytoin 100 tid    #HTN  - outpt f/u    #Misc  - DVT Prophylaxis: Lovenox  - GI Prophylaxis: None  - Diet: Dash  - Activity: As tolerated  - IV Fluids: None  - Code Status: Full    Dispo: Acute pending IV diuresis, HF consult, and possible DCCV  HOUSTON KING 77y Female  MRN#: [id number]   Hospital Day: 4d    SUBJECTIVE  Patient is a 77y old Female who presents with a chief complaint of afib (09 Jan 2023 12:31)  Currently admitted to medicine with the primary diagnosis of Pneumonia      INTERVAL HPI AND OVERNIGHT EVENTS:  Patient was examined and seen at bedside. This morning she is resting comfortably in bed and reports no issues or overnight events.    REVIEW OF SYMPTOMS:  Denies all.    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)      ALLERGIES:  No Known Allergies    MEDICATIONS:  STANDING MEDICATIONS  citalopram 20 milliGRAM(s) Oral daily  enoxaparin Injectable 90 milliGRAM(s) SubCutaneous every 12 hours  furosemide   Injectable 40 milliGRAM(s) IV Push daily  furosemide   Injectable 40 milliGRAM(s) IV Push once  influenza  Vaccine (HIGH DOSE) 0.7 milliLiter(s) IntraMuscular once  levETIRAcetam 500 milliGRAM(s) Oral two times a day  losartan 25 milliGRAM(s) Oral daily  metoprolol tartrate 50 milliGRAM(s) Oral two times a day  phenytoin   Capsule 100 milliGRAM(s) Oral three times a day    PRN MEDICATIONS      VITAL SIGNS: Last 24 Hours  T(C): 36.2 (10 Jarocho 2023 05:13), Max: 36.5 (09 Jan 2023 16:38)  T(F): 97.2 (10 Jarocho 2023 05:13), Max: 97.7 (09 Jan 2023 16:38)  HR: 73 (10 Jarocho 2023 05:13) (73 - 133)  BP: 111/76 (10 Jarocho 2023 05:13) (107/61 - 123/60)  BP(mean): 70 (09 Jan 2023 20:32) (70 - 70)  RR: 18 (10 Jarocho 2023 05:13) (16 - 18)  SpO2: 95% (10 Jarocho 2023 08:57) (95% - 97%)    LABS:                        13.8   6.19  )-----------( 212      ( 10 Jarocho 2023 05:58 )             42.4     01-10    140  |  100  |  22<H>  ----------------------------<  93  4.3   |  31  |  1.3    Ca    9.1      10 Jarocho 2023 05:58  Mg     1.9     01-10    TPro  5.8<L>  /  Alb  3.5  /  TBili  0.6  /  DBili  x   /  AST  26  /  ALT  26  /  AlkPhos  125<H>  01-10      PHYSICAL EXAM:  CONSTITUTIONAL: No acute distress, well-developed, well-groomed, AAOx0  HEAD: Atraumatic, normocephalic  EYES: EOM intact, PERRLA, conjunctiva and sclera clear  ENT: No JVD; moist mucous membranes  PULMONARY: Clear to auscultation bilaterally  CARDIOVASCULAR: Regular rate and rhythm  GASTROINTESTINAL: Soft, non-tender, non-distended  MUSCULOSKELETAL: 2+ peripheral pulses; 2+ pitting edema bilaterally extending to the midthigh  NEUROLOGY: non-focal  SKIN: No rashes or lesions; warm and dry    ASSESSMENT & PLAN:  77F PMHx CVA c/b aphasia, seizure disorder, HTN here with shortness of breath.    #Shortness of breath, presumed due to acute HFrEF  - cxr R base opacity, effusion  - no hypoxia, satting well on ra  - tte with ef 28%, rv dysfunction; no baseline to compare    Plan  - lasix 40 iv  - repeat cxr  - f/u cards      #AFib with RVR, new onset  - Heart Failure like 2/2 tachycardia  - JOSE C/DCCV planned for today but patient is not yet euvolemic, continue with IV diuresis     Plan:  - therapeutic lovenox  - Increase Lopressor to 50mg bid  - f/u cards for potential cardioversion    #CVA  - celexa 20  - therapeutic lovenox    #Seizure disorder  - keppra 500 bid  - phenytoin 100 tid    #HTN  - outpt f/u    #Misc  - DVT Prophylaxis: Lovenox  - GI Prophylaxis: None  - Diet: Dash  - Activity: As tolerated  - IV Fluids: None  - Code Status: Full    Dispo: Acute pending IV diuresis, HF consult, and possible DCCV

## 2023-01-10 NOTE — DISCHARGE NOTE NURSING/CASE MANAGEMENT/SOCIAL WORK - PATIENT PORTAL LINK FT
You can access the FollowMyHealth Patient Portal offered by [facility] by registering at the following website: [email or web address]. By joining [email or web address]’s FollowMyHealth portal, you will also be able to view your health information using other applications (apps) compatible with our system.

## 2023-01-11 LAB
ALBUMIN SERPL ELPH-MCNC: 4.1 G/DL — SIGNIFICANT CHANGE UP (ref 3.5–5.2)
ALP SERPL-CCNC: 126 U/L — HIGH (ref 30–115)
ALT FLD-CCNC: 27 U/L — SIGNIFICANT CHANGE UP (ref 0–41)
ANION GAP SERPL CALC-SCNC: 10 MMOL/L — SIGNIFICANT CHANGE UP (ref 7–14)
ANION GAP SERPL CALC-SCNC: 11 MMOL/L — SIGNIFICANT CHANGE UP (ref 7–14)
ANION GAP SERPL CALC-SCNC: 15 MMOL/L — HIGH (ref 7–14)
AST SERPL-CCNC: 27 U/L — SIGNIFICANT CHANGE UP (ref 0–41)
BASOPHILS # BLD AUTO: 0.06 K/UL — SIGNIFICANT CHANGE UP (ref 0–0.2)
BASOPHILS NFR BLD AUTO: 0.7 % — SIGNIFICANT CHANGE UP (ref 0–1)
BILIRUB SERPL-MCNC: 0.7 MG/DL — SIGNIFICANT CHANGE UP (ref 0.2–1.2)
BUN SERPL-MCNC: 20 MG/DL — SIGNIFICANT CHANGE UP (ref 10–20)
BUN SERPL-MCNC: 21 MG/DL — HIGH (ref 10–20)
BUN SERPL-MCNC: 23 MG/DL — HIGH (ref 10–20)
CALCIUM SERPL-MCNC: 9.1 MG/DL — SIGNIFICANT CHANGE UP (ref 8.4–10.5)
CALCIUM SERPL-MCNC: 9.2 MG/DL — SIGNIFICANT CHANGE UP (ref 8.4–10.5)
CALCIUM SERPL-MCNC: 9.5 MG/DL — SIGNIFICANT CHANGE UP (ref 8.4–10.5)
CHLORIDE SERPL-SCNC: 91 MMOL/L — LOW (ref 98–110)
CHLORIDE SERPL-SCNC: 92 MMOL/L — LOW (ref 98–110)
CHLORIDE SERPL-SCNC: 96 MMOL/L — LOW (ref 98–110)
CO2 SERPL-SCNC: 28 MMOL/L — SIGNIFICANT CHANGE UP (ref 17–32)
CO2 SERPL-SCNC: 33 MMOL/L — HIGH (ref 17–32)
CO2 SERPL-SCNC: 35 MMOL/L — HIGH (ref 17–32)
CREAT SERPL-MCNC: 1.3 MG/DL — SIGNIFICANT CHANGE UP (ref 0.7–1.5)
CREAT SERPL-MCNC: 1.3 MG/DL — SIGNIFICANT CHANGE UP (ref 0.7–1.5)
CREAT SERPL-MCNC: 1.4 MG/DL — SIGNIFICANT CHANGE UP (ref 0.7–1.5)
EGFR: 39 ML/MIN/1.73M2 — LOW
EGFR: 43 ML/MIN/1.73M2 — LOW
EGFR: 43 ML/MIN/1.73M2 — LOW
EOSINOPHIL # BLD AUTO: 0.16 K/UL — SIGNIFICANT CHANGE UP (ref 0–0.7)
EOSINOPHIL NFR BLD AUTO: 1.9 % — SIGNIFICANT CHANGE UP (ref 0–8)
GLUCOSE SERPL-MCNC: 119 MG/DL — HIGH (ref 70–99)
GLUCOSE SERPL-MCNC: 132 MG/DL — HIGH (ref 70–99)
GLUCOSE SERPL-MCNC: 96 MG/DL — SIGNIFICANT CHANGE UP (ref 70–99)
HCT VFR BLD CALC: 47.8 % — HIGH (ref 37–47)
HGB BLD-MCNC: 15.2 G/DL — SIGNIFICANT CHANGE UP (ref 12–16)
IMM GRANULOCYTES NFR BLD AUTO: 0.3 % — SIGNIFICANT CHANGE UP (ref 0.1–0.3)
LYMPHOCYTES # BLD AUTO: 1.5 K/UL — SIGNIFICANT CHANGE UP (ref 1.2–3.4)
LYMPHOCYTES # BLD AUTO: 17.5 % — LOW (ref 20.5–51.1)
MAGNESIUM SERPL-MCNC: 1.8 MG/DL — SIGNIFICANT CHANGE UP (ref 1.8–2.4)
MAGNESIUM SERPL-MCNC: 1.8 MG/DL — SIGNIFICANT CHANGE UP (ref 1.8–2.4)
MCHC RBC-ENTMCNC: 28.4 PG — SIGNIFICANT CHANGE UP (ref 27–31)
MCHC RBC-ENTMCNC: 31.8 G/DL — LOW (ref 32–37)
MCV RBC AUTO: 89.2 FL — SIGNIFICANT CHANGE UP (ref 81–99)
MONOCYTES # BLD AUTO: 0.91 K/UL — HIGH (ref 0.1–0.6)
MONOCYTES NFR BLD AUTO: 10.6 % — HIGH (ref 1.7–9.3)
NEUTROPHILS # BLD AUTO: 5.93 K/UL — SIGNIFICANT CHANGE UP (ref 1.4–6.5)
NEUTROPHILS NFR BLD AUTO: 69 % — SIGNIFICANT CHANGE UP (ref 42.2–75.2)
NRBC # BLD: 0 /100 WBCS — SIGNIFICANT CHANGE UP (ref 0–0)
PLATELET # BLD AUTO: 243 K/UL — SIGNIFICANT CHANGE UP (ref 130–400)
POTASSIUM SERPL-MCNC: 3.2 MMOL/L — LOW (ref 3.5–5)
POTASSIUM SERPL-MCNC: 3.3 MMOL/L — LOW (ref 3.5–5)
POTASSIUM SERPL-MCNC: 3.4 MMOL/L — LOW (ref 3.5–5)
POTASSIUM SERPL-SCNC: 3.2 MMOL/L — LOW (ref 3.5–5)
POTASSIUM SERPL-SCNC: 3.3 MMOL/L — LOW (ref 3.5–5)
POTASSIUM SERPL-SCNC: 3.4 MMOL/L — LOW (ref 3.5–5)
PROT SERPL-MCNC: 6.5 G/DL — SIGNIFICANT CHANGE UP (ref 6–8)
RBC # BLD: 5.36 M/UL — SIGNIFICANT CHANGE UP (ref 4.2–5.4)
RBC # FLD: 15.5 % — HIGH (ref 11.5–14.5)
SODIUM SERPL-SCNC: 136 MMOL/L — SIGNIFICANT CHANGE UP (ref 135–146)
SODIUM SERPL-SCNC: 136 MMOL/L — SIGNIFICANT CHANGE UP (ref 135–146)
SODIUM SERPL-SCNC: 139 MMOL/L — SIGNIFICANT CHANGE UP (ref 135–146)
WBC # BLD: 8.59 K/UL — SIGNIFICANT CHANGE UP (ref 4.8–10.8)
WBC # FLD AUTO: 8.59 K/UL — SIGNIFICANT CHANGE UP (ref 4.8–10.8)

## 2023-01-11 PROCEDURE — 99233 SBSQ HOSP IP/OBS HIGH 50: CPT

## 2023-01-11 RX ORDER — MAGNESIUM SULFATE 500 MG/ML
2 VIAL (ML) INJECTION
Refills: 0 | Status: COMPLETED | OUTPATIENT
Start: 2023-01-11 | End: 2023-01-11

## 2023-01-11 RX ORDER — POTASSIUM CHLORIDE 20 MEQ
20 PACKET (EA) ORAL
Refills: 0 | Status: DISCONTINUED | OUTPATIENT
Start: 2023-01-11 | End: 2023-01-11

## 2023-01-11 RX ORDER — POTASSIUM CHLORIDE 20 MEQ
20 PACKET (EA) ORAL
Refills: 0 | Status: COMPLETED | OUTPATIENT
Start: 2023-01-11 | End: 2023-01-11

## 2023-01-11 RX ORDER — NYSTATIN CREAM 100000 [USP'U]/G
1 CREAM TOPICAL
Refills: 0 | Status: DISCONTINUED | OUTPATIENT
Start: 2023-01-11 | End: 2023-01-26

## 2023-01-11 RX ORDER — POTASSIUM CHLORIDE 20 MEQ
40 PACKET (EA) ORAL EVERY 4 HOURS
Refills: 0 | Status: COMPLETED | OUTPATIENT
Start: 2023-01-11 | End: 2023-01-11

## 2023-01-11 RX ADMIN — Medication 25 GRAM(S): at 15:42

## 2023-01-11 RX ADMIN — Medication 50 MILLIEQUIVALENT(S): at 09:06

## 2023-01-11 RX ADMIN — NYSTATIN CREAM 1 APPLICATION(S): 100000 CREAM TOPICAL at 12:33

## 2023-01-11 RX ADMIN — Medication 25 GRAM(S): at 17:32

## 2023-01-11 RX ADMIN — LEVETIRACETAM 500 MILLIGRAM(S): 250 TABLET, FILM COATED ORAL at 05:26

## 2023-01-11 RX ADMIN — SPIRONOLACTONE 25 MILLIGRAM(S): 25 TABLET, FILM COATED ORAL at 05:29

## 2023-01-11 RX ADMIN — ENOXAPARIN SODIUM 90 MILLIGRAM(S): 100 INJECTION SUBCUTANEOUS at 17:33

## 2023-01-11 RX ADMIN — LOSARTAN POTASSIUM 25 MILLIGRAM(S): 100 TABLET, FILM COATED ORAL at 05:29

## 2023-01-11 RX ADMIN — Medication 100 MILLIGRAM(S): at 21:20

## 2023-01-11 RX ADMIN — LEVETIRACETAM 500 MILLIGRAM(S): 250 TABLET, FILM COATED ORAL at 17:33

## 2023-01-11 RX ADMIN — Medication 50 MILLIEQUIVALENT(S): at 05:27

## 2023-01-11 RX ADMIN — Medication 100 MILLIGRAM(S): at 05:27

## 2023-01-11 RX ADMIN — Medication 40 MILLIEQUIVALENT(S): at 17:34

## 2023-01-11 RX ADMIN — Medication 25 GRAM(S): at 19:29

## 2023-01-11 RX ADMIN — CITALOPRAM 20 MILLIGRAM(S): 10 TABLET, FILM COATED ORAL at 12:21

## 2023-01-11 RX ADMIN — Medication 50 MILLIGRAM(S): at 17:33

## 2023-01-11 RX ADMIN — Medication 50 MILLIGRAM(S): at 05:30

## 2023-01-11 RX ADMIN — ENOXAPARIN SODIUM 90 MILLIGRAM(S): 100 INJECTION SUBCUTANEOUS at 05:26

## 2023-01-11 RX ADMIN — Medication 100 MILLIGRAM(S): at 13:26

## 2023-01-11 RX ADMIN — Medication 40 MILLIGRAM(S): at 05:27

## 2023-01-11 RX ADMIN — Medication 40 MILLIEQUIVALENT(S): at 15:42

## 2023-01-11 NOTE — PROGRESS NOTE ADULT - SUBJECTIVE AND OBJECTIVE BOX
HOUSTON KING 77y Female  MRN#: [id number]   Hospital Day: 5d    SUBJECTIVE  Patient is a 77y old Female who presents with a chief complaint of afib (11 Jan 2023 13:12)  Currently admitted to medicine with the primary diagnosis of Pneumonia      INTERVAL HPI AND OVERNIGHT EVENTS:  Patient was examined and seen at bedside. This morning she is resting comfortably in bed and reports no issues or overnight events.    REVIEW OF SYMPTOMS:  Denies all.     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)      ALLERGIES:  No Known Allergies    MEDICATIONS:  STANDING MEDICATIONS  citalopram 20 milliGRAM(s) Oral daily  enoxaparin Injectable 90 milliGRAM(s) SubCutaneous every 12 hours  furosemide   Injectable 40 milliGRAM(s) IV Push daily  influenza  Vaccine (HIGH DOSE) 0.7 milliLiter(s) IntraMuscular once  levETIRAcetam 500 milliGRAM(s) Oral two times a day  losartan 25 milliGRAM(s) Oral daily  magnesium sulfate  IVPB 2 Gram(s) IV Intermittent every 2 hours  metoprolol tartrate 50 milliGRAM(s) Oral two times a day  nystatin Cream 1 Application(s) Topical two times a day  phenytoin   Capsule 100 milliGRAM(s) Oral three times a day  potassium chloride    Tablet ER 40 milliEquivalent(s) Oral every 4 hours  spironolactone 25 milliGRAM(s) Oral daily    PRN MEDICATIONS      VITAL SIGNS: Last 24 Hours  T(C): 35.7 (11 Jan 2023 13:45), Max: 35.9 (10 Jarocho 2023 20:02)  T(F): 96.2 (11 Jan 2023 13:45), Max: 96.6 (10 Jarocho 2023 20:02)  HR: 118 (11 Jan 2023 13:45) (78 - 118)  BP: 121/84 (11 Jan 2023 13:45) (92/51 - 121/84)  BP(mean): --  RR: 18 (11 Jan 2023 13:45) (18 - 18)  SpO2: --    LABS:                        15.2   8.59  )-----------( 243      ( 11 Jan 2023 07:57 )             47.8     01-11    136  |  92<L>  |  21<H>  ----------------------------<  119<H>  3.3<L>   |  33<H>  |  1.3    Ca    9.2      11 Jan 2023 11:26  Mg     1.8     01-11    TPro  6.5  /  Alb  4.1  /  TBili  0.7  /  DBili  x   /  AST  27  /  ALT  27  /  AlkPhos  126<H>  01-11    PHYSICAL EXAM:  CONSTITUTIONAL: No acute distress, well-developed, well-groomed, AAOx0  HEAD: Atraumatic, normocephalic  EYES: EOM intact, PERRLA, conjunctiva and sclera clear  ENT: No JVD; moist mucous membranes  PULMONARY: Clear to auscultation bilaterally  CARDIOVASCULAR: Regular rate and rhythm  GASTROINTESTINAL: Soft, non-tender, non-distended  MUSCULOSKELETAL: 2+ peripheral pulses; +1 lower extremity edema to the midcalf   NEUROLOGY: non-focal  SKIN: No rashes or lesions; warm and dry    ASSESSMENT & PLAN:  77F PMHx CVA c/b aphasia, seizure disorder, HTN here with shortness of breath.    #Shortness of breath, presumed due to acute HFrEF  - CXR R base opacity, effusion  - No hypoxia, satting well on ra  - TTE with ef 28%, rv dysfunction; no baseline to compare  - Patient diuresing appropriately     Plan  - Lasix 40 iv  - Start aldactone 25mg daily   - Repeat cxr  - F/u cards      #AFib with RVR, new onset  - Heart Failure like 2/2 tachycardia  - JOSE C/DCCV planned for today but patient is not yet euvolemic, continue with IV diuresis     Plan:  - Therapeutic lovenox  - Increase Lopressor to 50mg bid  - F/u cards for potential cardioversion    #CVA  - Celexa 20  - Therapeutic lovenox    #Seizure disorder  - Keppra 500 bid  - Phenytoin 100 tid    #HTN  - Outpt f/u    #Misc  - DVT Prophylaxis: Lovenox  - GI Prophylaxis: None  - Diet: Dash  - Activity: As tolerated  - IV Fluids: None  - Code Status: Full    Dispo: Acute pending IV diuresis, HF consult, and possible DCCV       #Misc  - DVT Prophylaxis:  - GI Prophylaxis:  - Diet:  - Activity:  - IV Fluids:  - Code Status:    Dispo:

## 2023-01-11 NOTE — PROGRESS NOTE ADULT - ASSESSMENT
76 yo F PMHx CVA, seizure disorder, HTN presented for evaluation of shortness of breath.    Dypsnea due to HFrEF, afib rvr  Acute HFrEF: new diagnosis.   SOB, leg edema.   -Echo showed LVEF 28%, severe reduced RV systolic function, mod to severe MR, mild to mod AR, mild TR, mod pulmonary HTN,    -Continue Lasix 40mg IV, received extra dose yesterday, c/w Aldactone 25mg daily.   -Continue Metoprolol and Losartan.  -Per Cardiology cardiomyopathy is likely from tachycardia, outpt work up  -possible DCCV once euvolemic  -c/w Low sodium diet, daily weights, strict I&O    New Paroxysmal Atrial Fibrillation with Rapid Ventricular Response:   -HR is not well controlled. -->RVR overnight, controlled presently  -Echo showed severe biatrial enlargement.   -Increase Metoprolol to 50mg BID, Continue Lovenox, plan for JOSE C and cardioversion once hypervolemia improve.     Dysuria  - family reports pt complained of burning yesterday. Straight cath attempted but pt refused. Pt currently refusing any UA    History of CVA  -Family Reports expressive aphasia, Not on ASA or Statin ,     Seizure disorder  -Continue Keppra 500 bid and phenytoin 100 tid      DVT Prophylaxis: Lovenox  GI Prophylaxis: None  Code Status: Full    #Progress Note Handoff:  Pending (specify):  Cardiology follow up, improving AFIB, volume overload.   Family discussion: with her brother, update about diuresis, CHF.   Disposition: Home.

## 2023-01-11 NOTE — PROGRESS NOTE ADULT - SUBJECTIVE AND OBJECTIVE BOX
CHIEF COMPLAINT:    Patient is a 77y old  Female who presents with a chief complaint of afib     INTERVAL HPI/OVERNIGHT EVENTS:    Patient seen and examined at bedside. No acute overnight events occurred.    ROS: Denies SOB, chest pain, dysuria. All other systems are negative.    Medications:  Standing  citalopram 20 milliGRAM(s) Oral daily  enoxaparin Injectable 90 milliGRAM(s) SubCutaneous every 12 hours  furosemide   Injectable 40 milliGRAM(s) IV Push daily  influenza  Vaccine (HIGH DOSE) 0.7 milliLiter(s) IntraMuscular once  levETIRAcetam 500 milliGRAM(s) Oral two times a day  losartan 25 milliGRAM(s) Oral daily  metoprolol tartrate 50 milliGRAM(s) Oral two times a day  nystatin Cream 1 Application(s) Topical two times a day  phenytoin   Capsule 100 milliGRAM(s) Oral three times a day  spironolactone 25 milliGRAM(s) Oral daily    PRN Meds        Vital Signs:    T(F): 95.5 (23 @ 04:55), Max: 96.6 (01-10-23 @ 20:02)  HR: 91 (23 @ 04:55) (78 - 91)  BP: 114/71 (23 @ 04:55) (92/51 - 114/71)  RR: 18 (23 @ 04:55) (18 - 18)  SpO2: --  I&O's Summary    10 Jarocho 2023 07:  -  2023 07:00  --------------------------------------------------------  IN: 210 mL / OUT: 3200 mL / NET: -2990 mL    2023 07:01  -  2023 13:12  --------------------------------------------------------  IN: 210 mL / OUT: 800 mL / NET: -590 mL      Daily     Daily Weight in k (2023 04:55)  CAPILLARY BLOOD GLUCOSE          PHYSICAL EXAM:  GENERAL:  NAD  SKIN: Suspected candidal infection in groin, buttock  HEENT: Atraumatic. Normocephalic. Anicteric  NECK:  No JVD.   PULMONARY: Clear to ausculation bilaterally. No wheezing. No rales  CVS: Normal S1, S2. Regular rate and rhythm. No murmurs.  ABDOMEN/GI: Soft, Nontender, Nondistended; Bowel sounds are present  EXTREMITIES:  No edema B/L LE.  NEUROLOGIC:  No motor deficit.  PSYCH: Alert & oriented x 3, normal affect      LABS:                        15.2   8.59  )-----------( 243      ( 2023 07:57 )             47.8         136  |  91<L>  |  20  ----------------------------<  132<H>  3.2<L>   |  35<H>  |  1.4    Ca    9.5      2023 07:57  Mg     1.8         TPro  6.5  /  Alb  4.1  /  TBili  0.7  /  DBili  x   /  AST  27  /  ALT  27  /  AlkPhos  126<H>        Serum Pro-Brain Natriuretic Peptide: 5671 pg/mL (23 @ 15:19)          RADIOLOGY & ADDITIONAL TESTS:  Imaging or report Personally Reviewed:  [ ] YES  [ ] NO -->no new images    Telemetry reviewed independently - Afib RVR  EKG reviewed independently -->no new EKGs    Consultant(s) Notes Reviewed:  [ ] YES  [ ] NO  Care Discussed with Consultants/Other Providers [ ] YES  [ ] NO    Case discussed with resident  Care discussed with pt

## 2023-01-12 LAB
ALBUMIN SERPL ELPH-MCNC: 3.5 G/DL — SIGNIFICANT CHANGE UP (ref 3.5–5.2)
ALP SERPL-CCNC: 118 U/L — HIGH (ref 30–115)
ALT FLD-CCNC: 26 U/L — SIGNIFICANT CHANGE UP (ref 0–41)
ANION GAP SERPL CALC-SCNC: 10 MMOL/L — SIGNIFICANT CHANGE UP (ref 7–14)
AST SERPL-CCNC: 31 U/L — SIGNIFICANT CHANGE UP (ref 0–41)
BASOPHILS # BLD AUTO: 0.06 K/UL — SIGNIFICANT CHANGE UP (ref 0–0.2)
BASOPHILS NFR BLD AUTO: 0.7 % — SIGNIFICANT CHANGE UP (ref 0–1)
BILIRUB SERPL-MCNC: 0.6 MG/DL — SIGNIFICANT CHANGE UP (ref 0.2–1.2)
BUN SERPL-MCNC: 23 MG/DL — HIGH (ref 10–20)
CALCIUM SERPL-MCNC: 8.9 MG/DL — SIGNIFICANT CHANGE UP (ref 8.4–10.5)
CHLORIDE SERPL-SCNC: 96 MMOL/L — LOW (ref 98–110)
CO2 SERPL-SCNC: 29 MMOL/L — SIGNIFICANT CHANGE UP (ref 17–32)
CREAT SERPL-MCNC: 1.3 MG/DL — SIGNIFICANT CHANGE UP (ref 0.7–1.5)
EGFR: 43 ML/MIN/1.73M2 — LOW
EOSINOPHIL # BLD AUTO: 0.19 K/UL — SIGNIFICANT CHANGE UP (ref 0–0.7)
EOSINOPHIL NFR BLD AUTO: 2.1 % — SIGNIFICANT CHANGE UP (ref 0–8)
GLUCOSE SERPL-MCNC: 103 MG/DL — HIGH (ref 70–99)
HCT VFR BLD CALC: 42.1 % — SIGNIFICANT CHANGE UP (ref 37–47)
HGB BLD-MCNC: 14 G/DL — SIGNIFICANT CHANGE UP (ref 12–16)
IMM GRANULOCYTES NFR BLD AUTO: 0.3 % — SIGNIFICANT CHANGE UP (ref 0.1–0.3)
LYMPHOCYTES # BLD AUTO: 1.62 K/UL — SIGNIFICANT CHANGE UP (ref 1.2–3.4)
LYMPHOCYTES # BLD AUTO: 18.1 % — LOW (ref 20.5–51.1)
MAGNESIUM SERPL-MCNC: 2.9 MG/DL — HIGH (ref 1.8–2.4)
MCHC RBC-ENTMCNC: 28.7 PG — SIGNIFICANT CHANGE UP (ref 27–31)
MCHC RBC-ENTMCNC: 33.3 G/DL — SIGNIFICANT CHANGE UP (ref 32–37)
MCV RBC AUTO: 86.4 FL — SIGNIFICANT CHANGE UP (ref 81–99)
MONOCYTES # BLD AUTO: 1.15 K/UL — HIGH (ref 0.1–0.6)
MONOCYTES NFR BLD AUTO: 12.8 % — HIGH (ref 1.7–9.3)
NEUTROPHILS # BLD AUTO: 5.91 K/UL — SIGNIFICANT CHANGE UP (ref 1.4–6.5)
NEUTROPHILS NFR BLD AUTO: 66 % — SIGNIFICANT CHANGE UP (ref 42.2–75.2)
NRBC # BLD: 0 /100 WBCS — SIGNIFICANT CHANGE UP (ref 0–0)
PLATELET # BLD AUTO: 222 K/UL — SIGNIFICANT CHANGE UP (ref 130–400)
POTASSIUM SERPL-MCNC: 4 MMOL/L — SIGNIFICANT CHANGE UP (ref 3.5–5)
POTASSIUM SERPL-SCNC: 4 MMOL/L — SIGNIFICANT CHANGE UP (ref 3.5–5)
PROT SERPL-MCNC: 6 G/DL — SIGNIFICANT CHANGE UP (ref 6–8)
RBC # BLD: 4.87 M/UL — SIGNIFICANT CHANGE UP (ref 4.2–5.4)
RBC # FLD: 15.4 % — HIGH (ref 11.5–14.5)
SARS-COV-2 RNA SPEC QL NAA+PROBE: SIGNIFICANT CHANGE UP
SODIUM SERPL-SCNC: 135 MMOL/L — SIGNIFICANT CHANGE UP (ref 135–146)
WBC # BLD: 8.96 K/UL — SIGNIFICANT CHANGE UP (ref 4.8–10.8)
WBC # FLD AUTO: 8.96 K/UL — SIGNIFICANT CHANGE UP (ref 4.8–10.8)

## 2023-01-12 PROCEDURE — 99232 SBSQ HOSP IP/OBS MODERATE 35: CPT

## 2023-01-12 PROCEDURE — 99233 SBSQ HOSP IP/OBS HIGH 50: CPT

## 2023-01-12 RX ORDER — FUROSEMIDE 40 MG
40 TABLET ORAL ONCE
Refills: 0 | Status: COMPLETED | OUTPATIENT
Start: 2023-01-12 | End: 2023-01-12

## 2023-01-12 RX ORDER — FUROSEMIDE 40 MG
40 TABLET ORAL
Refills: 0 | Status: DISCONTINUED | OUTPATIENT
Start: 2023-01-12 | End: 2023-01-15

## 2023-01-12 RX ORDER — METOPROLOL TARTRATE 50 MG
50 TABLET ORAL THREE TIMES A DAY
Refills: 0 | Status: DISCONTINUED | OUTPATIENT
Start: 2023-01-12 | End: 2023-01-20

## 2023-01-12 RX ADMIN — Medication 100 MILLIGRAM(S): at 21:00

## 2023-01-12 RX ADMIN — NYSTATIN CREAM 1 APPLICATION(S): 100000 CREAM TOPICAL at 18:20

## 2023-01-12 RX ADMIN — ENOXAPARIN SODIUM 90 MILLIGRAM(S): 100 INJECTION SUBCUTANEOUS at 18:19

## 2023-01-12 RX ADMIN — Medication 50 MILLIGRAM(S): at 21:00

## 2023-01-12 RX ADMIN — NYSTATIN CREAM 1 APPLICATION(S): 100000 CREAM TOPICAL at 05:54

## 2023-01-12 RX ADMIN — Medication 100 MILLIGRAM(S): at 05:53

## 2023-01-12 RX ADMIN — LOSARTAN POTASSIUM 25 MILLIGRAM(S): 100 TABLET, FILM COATED ORAL at 11:53

## 2023-01-12 RX ADMIN — Medication 100 MILLIGRAM(S): at 13:39

## 2023-01-12 RX ADMIN — Medication 50 MILLIGRAM(S): at 05:53

## 2023-01-12 RX ADMIN — Medication 40 MILLIGRAM(S): at 21:00

## 2023-01-12 RX ADMIN — LEVETIRACETAM 500 MILLIGRAM(S): 250 TABLET, FILM COATED ORAL at 05:53

## 2023-01-12 RX ADMIN — SPIRONOLACTONE 25 MILLIGRAM(S): 25 TABLET, FILM COATED ORAL at 09:00

## 2023-01-12 RX ADMIN — LEVETIRACETAM 500 MILLIGRAM(S): 250 TABLET, FILM COATED ORAL at 18:20

## 2023-01-12 RX ADMIN — Medication 40 MILLIGRAM(S): at 05:54

## 2023-01-12 RX ADMIN — ENOXAPARIN SODIUM 90 MILLIGRAM(S): 100 INJECTION SUBCUTANEOUS at 05:54

## 2023-01-12 RX ADMIN — Medication 40 MILLIGRAM(S): at 18:26

## 2023-01-12 RX ADMIN — CITALOPRAM 20 MILLIGRAM(S): 10 TABLET, FILM COATED ORAL at 11:53

## 2023-01-12 NOTE — PROGRESS NOTE ADULT - SUBJECTIVE AND OBJECTIVE BOX
CHIEF COMPLAINT:  Patient is a 77y old  Female who presents with a chief complaint of afib (12 Jan 2023 17:16)      INTERVAL HISTORY/OVERNIGHT EVENTS:  A Fib with RVR.     ======================  MEDICATIONS:  citalopram 20 milliGRAM(s) Oral daily  enoxaparin Injectable 90 milliGRAM(s) SubCutaneous every 12 hours  influenza  Vaccine (HIGH DOSE) 0.7 milliLiter(s) IntraMuscular once  levETIRAcetam 500 milliGRAM(s) Oral two times a day  losartan 25 milliGRAM(s) Oral daily  metoprolol tartrate 50 milliGRAM(s) Oral two times a day  nystatin Cream 1 Application(s) Topical two times a day  phenytoin   Capsule 100 milliGRAM(s) Oral three times a day  spironolactone 25 milliGRAM(s) Oral daily        ======================  PHYSICAL EXAMINATION:  GEN:  nad.   HEENT:  eomi. ncat, JVD  PULM:  b/l lung sounds   CARD: Irregular, s1, s2  ABD: +bs. ntnd  EXT:  no new rashes.    NEURO: AAOX 2  no new focal deficits.   ======================  OBJECTIVE:        VS:  T(F): 97 (01-12 @ 13:08), Max: 97.6 (01-12 @ 04:34)  HR: 108 (01-12 @ 13:08) (103 - 114)  BP: 102/71 (01-12 @ 13:08) (102/63 - 106/62)  RR: 18 (01-12 @ 13:08) (18 - 18)    I/O:      01-09 @ 07:01  -  01-10 @ 07:00  --------------------------------------------------------  IN: 0 mL / OUT: 500 mL / NET: -500 mL    01-10 @ 07:01  -  01-11 @ 07:00  --------------------------------------------------------  IN: 210 mL / OUT: 3200 mL / NET: -2990 mL    01-11 @ 07:01 - 01-12 @ 07:00  --------------------------------------------------------  IN: 420 mL / OUT: 1700 mL / NET: -1280 mL    01-12 @ 07:01 - 01-12 @ 17:48  --------------------------------------------------------  IN: 360 mL / OUT: 1200 mL / NET: -840 mL        Weight trend:  Weight (kg): 84.8 (01-09)    ======================    LABS:                          14.0   8.96  )-----------( 222      ( 12 Jan 2023 07:31 )             42.1     01-12    135  |  96<L>  |  23<H>  ----------------------------<  103<H>  4.0   |  29  |  1.3    Ca    8.9      12 Jan 2023 07:31  Mg     2.9     01-12    TPro  6.0  /  Alb  3.5  /  TBili  0.6  /  DBili  x   /  AST  31  /  ALT  26  /  AlkPhos  118<H>  01-12    LIVER FUNCTIONS - ( 12 Jan 2023 07:31 )  Alb: 3.5 g/dL / Pro: 6.0 g/dL / ALK PHOS: 118 U/L / ALT: 26 U/L / AST: 31 U/L / GGT: x

## 2023-01-12 NOTE — PROGRESS NOTE ADULT - SUBJECTIVE AND OBJECTIVE BOX
CHIEF COMPLAINT:    Patient is a 77y old  Female who presents with a chief complaint of afib     INTERVAL HPI/OVERNIGHT EVENTS:    Patient seen and examined at bedside. No acute overnight events occurred.    ROS: Denies SOB, chest pain. All other systems are negative.    Medications:  Standing  citalopram 20 milliGRAM(s) Oral daily  enoxaparin Injectable 90 milliGRAM(s) SubCutaneous every 12 hours  furosemide   Injectable 40 milliGRAM(s) IV Push daily  influenza  Vaccine (HIGH DOSE) 0.7 milliLiter(s) IntraMuscular once  levETIRAcetam 500 milliGRAM(s) Oral two times a day  losartan 25 milliGRAM(s) Oral daily  metoprolol tartrate 50 milliGRAM(s) Oral two times a day  nystatin Cream 1 Application(s) Topical two times a day  phenytoin   Capsule 100 milliGRAM(s) Oral three times a day  spironolactone 25 milliGRAM(s) Oral daily    PRN Meds        Vital Signs:    T(F): 97 (01-12-23 @ 13:08), Max: 97.6 (01-12-23 @ 04:34)  HR: 108 (01-12-23 @ 13:08) (103 - 114)  BP: 102/71 (01-12-23 @ 13:08) (102/63 - 106/62)  RR: 18 (01-12-23 @ 13:08) (18 - 18)  SpO2: --  I&O's Summary    11 Jan 2023 07:01  -  12 Jan 2023 07:00  --------------------------------------------------------  IN: 420 mL / OUT: 1700 mL / NET: -1280 mL    12 Jan 2023 07:01  -  12 Jan 2023 17:16  --------------------------------------------------------  IN: 360 mL / OUT: 1200 mL / NET: -840 mL      PHYSICAL EXAM:  GENERAL:  NAD  SKIN: No rashes or lesions  HEENT: Atraumatic. Normocephalic. Anicteric  NECK:  No JVD.   PULMONARY: Clear to ausculation bilaterally. No wheezing. No rales  CVS: Normal S1, S2. Regular rate and rhythm. No murmurs.  ABDOMEN/GI: Soft, Nontender, Nondistended; Bowel sounds are present  EXTREMITIES:  No edema B/L LE.  NEUROLOGIC:  No motor deficit.  PSYCH: Alert & oriented x 3, normal affect      LABS:                        14.0   8.96  )-----------( 222      ( 12 Jan 2023 07:31 )             42.1     01-12    135  |  96<L>  |  23<H>  ----------------------------<  103<H>  4.0   |  29  |  1.3    Ca    8.9      12 Jan 2023 07:31  Mg     2.9     01-12    TPro  6.0  /  Alb  3.5  /  TBili  0.6  /  DBili  x   /  AST  31  /  ALT  26  /  AlkPhos  118<H>  01-12      Serum Pro-Brain Natriuretic Peptide: 5671 pg/mL (01-06-23 @ 15:19)          RADIOLOGY & ADDITIONAL TESTS:  Imaging or report Personally Reviewed:  [ ] YES  [ ] NO -->no new images    Telemetry reviewed independently - NSR, no acute events  EKG reviewed independently -->no new EKGs    Consultant(s) Notes Reviewed:  [ ] YES  [ ] NO  Care Discussed with Consultants/Other Providers [ ] YES  [ ] NO    Case discussed with resident  Care discussed with pt

## 2023-01-12 NOTE — PROGRESS NOTE ADULT - ASSESSMENT
IMPRESSION  Acute HFrEF exacerbation  A Fib with RVR  CVA  Hx of seizure disorder    RECOMMENDATIONS  c/w telemonitoring  Increase I/V Lasix 40mg to BID for today  Increase metoprolol to 50 mg TID  c/w losartan 25 mg qd, if tolerating consider switching to entresto  c/w Lovenox for A/C. Can switch to Eliquis on discharge  will schedule for JOSE C/CV in am. Need HCP/family consent pt is not AAO X 3. Keep NPO after midnight. Repeat COVID 19 PCR.   outpatient f/u of ischemic w/up

## 2023-01-12 NOTE — PROGRESS NOTE ADULT - ATTENDING COMMENTS
Briefly 77 year old woman with ADHF and afib. Please do lasix 40mg IV BID today. If euvolemic tomorrow, she can have a angi/dccv tomorrow. NPO past midnight for the same. Will need consent from HCP.
Briefly, 77 year old woman with atrial fibrillation and newly diagnosed HFrEF. Diuresis and rate control as above. Would recommend GOC discussion with family as per primary team.

## 2023-01-12 NOTE — PROGRESS NOTE ADULT - ASSESSMENT
78 yo F PMHx CVA, seizure disorder, HTN presented for evaluation of shortness of breath.    Dypsnea due to HFrEF, afib rvr  Acute HFrEF: new diagnosis.   SOB, leg edema.   -Echo showed LVEF 28%, severe reduced RV systolic function, mod to severe MR, mild to mod AR, mild TR, mod pulmonary HTN,    -s/p IV lasix, change to PO in am, c/w Aldactone 25mg daily. PT appears euvolemic  -Continue Metoprolol and Losartan.  -Per Cardiology cardiomyopathy is likely from tachycardia, outpt work up  -possible DCCV once euvolemic -- cardio follow up requested  -c/w Low sodium diet, daily weights, strict I&O    New Paroxysmal Atrial Fibrillation with Rapid Ventricular Response:   -HR is not well controlled. -->RVR overnight, controlled presently  -Echo showed severe biatrial enlargement.   -c/w Metoprolol to 50mg BID, Continue Lovenox, plan for JOSE C and cardioversion     Dysuria  - family reports pt complained of burning. Straight cath attempted but pt refused. Pt currently refusing any UA. Denies dysuria    History of CVA  -Family Reports expressive aphasia, Not on ASA or Statin ,     Seizure disorder  -Continue Keppra 500 bid and phenytoin 100 tid      DVT Prophylaxis: Lovenox  GI Prophylaxis: None  Code Status: Full    #Progress Note Handoff:  Pending (specify):  Cardiology follow up, improving AFIB, volume overload.   Family discussion: with her brother, update about diuresis, CHF.   Disposition: Home.

## 2023-01-13 LAB
ALBUMIN SERPL ELPH-MCNC: 3.7 G/DL — SIGNIFICANT CHANGE UP (ref 3.5–5.2)
ALP SERPL-CCNC: 113 U/L — SIGNIFICANT CHANGE UP (ref 30–115)
ALT FLD-CCNC: 23 U/L — SIGNIFICANT CHANGE UP (ref 0–41)
ANION GAP SERPL CALC-SCNC: 9 MMOL/L — SIGNIFICANT CHANGE UP (ref 7–14)
AST SERPL-CCNC: 27 U/L — SIGNIFICANT CHANGE UP (ref 0–41)
BASOPHILS # BLD AUTO: 0.05 K/UL — SIGNIFICANT CHANGE UP (ref 0–0.2)
BASOPHILS NFR BLD AUTO: 0.7 % — SIGNIFICANT CHANGE UP (ref 0–1)
BILIRUB SERPL-MCNC: 0.6 MG/DL — SIGNIFICANT CHANGE UP (ref 0.2–1.2)
BUN SERPL-MCNC: 23 MG/DL — HIGH (ref 10–20)
CALCIUM SERPL-MCNC: 8.7 MG/DL — SIGNIFICANT CHANGE UP (ref 8.4–10.5)
CHLORIDE SERPL-SCNC: 94 MMOL/L — LOW (ref 98–110)
CO2 SERPL-SCNC: 36 MMOL/L — HIGH (ref 17–32)
CREAT SERPL-MCNC: 1.5 MG/DL — SIGNIFICANT CHANGE UP (ref 0.7–1.5)
EGFR: 36 ML/MIN/1.73M2 — LOW
EOSINOPHIL # BLD AUTO: 0.18 K/UL — SIGNIFICANT CHANGE UP (ref 0–0.7)
EOSINOPHIL NFR BLD AUTO: 2.5 % — SIGNIFICANT CHANGE UP (ref 0–8)
GLUCOSE SERPL-MCNC: 92 MG/DL — SIGNIFICANT CHANGE UP (ref 70–99)
HCT VFR BLD CALC: 43.1 % — SIGNIFICANT CHANGE UP (ref 37–47)
HGB BLD-MCNC: 14 G/DL — SIGNIFICANT CHANGE UP (ref 12–16)
IMM GRANULOCYTES NFR BLD AUTO: 0.4 % — HIGH (ref 0.1–0.3)
LYMPHOCYTES # BLD AUTO: 1.34 K/UL — SIGNIFICANT CHANGE UP (ref 1.2–3.4)
LYMPHOCYTES # BLD AUTO: 18.5 % — LOW (ref 20.5–51.1)
MAGNESIUM SERPL-MCNC: 2.5 MG/DL — HIGH (ref 1.8–2.4)
MCHC RBC-ENTMCNC: 28.9 PG — SIGNIFICANT CHANGE UP (ref 27–31)
MCHC RBC-ENTMCNC: 32.5 G/DL — SIGNIFICANT CHANGE UP (ref 32–37)
MCV RBC AUTO: 88.9 FL — SIGNIFICANT CHANGE UP (ref 81–99)
MONOCYTES # BLD AUTO: 1.09 K/UL — HIGH (ref 0.1–0.6)
MONOCYTES NFR BLD AUTO: 15 % — HIGH (ref 1.7–9.3)
NEUTROPHILS # BLD AUTO: 4.57 K/UL — SIGNIFICANT CHANGE UP (ref 1.4–6.5)
NEUTROPHILS NFR BLD AUTO: 62.9 % — SIGNIFICANT CHANGE UP (ref 42.2–75.2)
NRBC # BLD: 0 /100 WBCS — SIGNIFICANT CHANGE UP (ref 0–0)
PLATELET # BLD AUTO: 206 K/UL — SIGNIFICANT CHANGE UP (ref 130–400)
POTASSIUM SERPL-MCNC: 3.7 MMOL/L — SIGNIFICANT CHANGE UP (ref 3.5–5)
POTASSIUM SERPL-SCNC: 3.7 MMOL/L — SIGNIFICANT CHANGE UP (ref 3.5–5)
PROT SERPL-MCNC: 5.6 G/DL — LOW (ref 6–8)
RBC # BLD: 4.85 M/UL — SIGNIFICANT CHANGE UP (ref 4.2–5.4)
RBC # FLD: 15.2 % — HIGH (ref 11.5–14.5)
SODIUM SERPL-SCNC: 139 MMOL/L — SIGNIFICANT CHANGE UP (ref 135–146)
WBC # BLD: 7.26 K/UL — SIGNIFICANT CHANGE UP (ref 4.8–10.8)
WBC # FLD AUTO: 7.26 K/UL — SIGNIFICANT CHANGE UP (ref 4.8–10.8)

## 2023-01-13 PROCEDURE — 99233 SBSQ HOSP IP/OBS HIGH 50: CPT

## 2023-01-13 RX ADMIN — LEVETIRACETAM 500 MILLIGRAM(S): 250 TABLET, FILM COATED ORAL at 05:34

## 2023-01-13 RX ADMIN — Medication 100 MILLIGRAM(S): at 05:34

## 2023-01-13 RX ADMIN — LOSARTAN POTASSIUM 25 MILLIGRAM(S): 100 TABLET, FILM COATED ORAL at 08:00

## 2023-01-13 RX ADMIN — SPIRONOLACTONE 25 MILLIGRAM(S): 25 TABLET, FILM COATED ORAL at 05:33

## 2023-01-13 RX ADMIN — Medication 40 MILLIGRAM(S): at 05:35

## 2023-01-13 RX ADMIN — Medication 50 MILLIGRAM(S): at 05:34

## 2023-01-13 RX ADMIN — NYSTATIN CREAM 1 APPLICATION(S): 100000 CREAM TOPICAL at 05:34

## 2023-01-13 RX ADMIN — LEVETIRACETAM 500 MILLIGRAM(S): 250 TABLET, FILM COATED ORAL at 17:31

## 2023-01-13 RX ADMIN — ENOXAPARIN SODIUM 90 MILLIGRAM(S): 100 INJECTION SUBCUTANEOUS at 05:34

## 2023-01-13 RX ADMIN — NYSTATIN CREAM 1 APPLICATION(S): 100000 CREAM TOPICAL at 17:31

## 2023-01-13 RX ADMIN — ENOXAPARIN SODIUM 90 MILLIGRAM(S): 100 INJECTION SUBCUTANEOUS at 17:29

## 2023-01-13 NOTE — PROGRESS NOTE ADULT - SUBJECTIVE AND OBJECTIVE BOX
CHIEF COMPLAINT:    Patient is a 77y old  Female who presents with a chief complaint of afib     INTERVAL HPI/OVERNIGHT EVENTS:    Patient seen and examined at bedside. No acute overnight events occurred.    ROS: Denies SOB, chest pain. All other systems are negative.    Medications:  Standing  citalopram 20 milliGRAM(s) Oral daily  enoxaparin Injectable 90 milliGRAM(s) SubCutaneous every 12 hours  furosemide   Injectable 40 milliGRAM(s) IV Push two times a day  influenza  Vaccine (HIGH DOSE) 0.7 milliLiter(s) IntraMuscular once  levETIRAcetam 500 milliGRAM(s) Oral two times a day  losartan 25 milliGRAM(s) Oral daily  metoprolol tartrate 50 milliGRAM(s) Oral three times a day  nystatin Cream 1 Application(s) Topical two times a day  phenytoin   Capsule 100 milliGRAM(s) Oral three times a day  spironolactone 25 milliGRAM(s) Oral daily    PRN Meds        Vital Signs:    T(F): 95.6 (23 @ 14:56), Max: 97.3 (23 @ 05:04)  HR: 104 (23 @ 14:56) (69 - 120)  BP: 95/56 (23 @ 14:56) (95/56 - 128/55)  RR: 18 (23 @ 14:56) (18 - 18)  SpO2: 95% (23 @ 11:26) (95% - 95%)  I&O's Summary    2023 07:  -  2023 07:00  --------------------------------------------------------  IN: 560 mL / OUT: 2700 mL / NET: -2140 mL    2023 07:  -  2023 17:40  --------------------------------------------------------  IN: 0 mL / OUT: 700 mL / NET: -700 mL      Daily     Daily Weight in k (2023 05:04)  CAPILLARY BLOOD GLUCOSE          PHYSICAL EXAM:  GENERAL:  NAD  SKIN: No rashes or lesions  HEENT: Atraumatic. Normocephalic. Anicteric  NECK:  No JVD.   PULMONARY: Clear to ausculation bilaterally. No wheezing. No rales  CVS: Normal S1, S2. Regular rate and rhythm. No murmurs.  ABDOMEN/GI: Soft, Nontender, Nondistended; Bowel sounds are present  EXTREMITIES:  No edema B/L LE.  NEUROLOGIC:  No motor deficit.  PSYCH: Alert & oriented x 3, normal affect      LABS:                        14.0   7.26  )-----------( 206      ( 2023 07:49 )             43.1         139  |  94<L>  |  23<H>  ----------------------------<  92  3.7   |  36<H>  |  1.5    Ca    8.7      2023 07:49  Mg     2.5         TPro  5.6<L>  /  Alb  3.7  /  TBili  0.6  /  DBili  x   /  AST  27  /  ALT  23  /  AlkPhos  113          RADIOLOGY & ADDITIONAL TESTS:  Imaging or report Personally Reviewed:  [ ] YES  [ ] NO -->no new images    Telemetry reviewed independently - afib  EKG reviewed independently -->no new EKGs    Consultant(s) Notes Reviewed:  [ ] YES  [ ] NO  Care Discussed with Consultants/Other Providers [ ] YES  [ ] NO    Case discussed with resident  Care discussed with pt

## 2023-01-13 NOTE — PROGRESS NOTE ADULT - SUBJECTIVE AND OBJECTIVE BOX
HOUSTON KING 77y Female  MRN#: [id number]   Hospital Day: 7d    SUBJECTIVE  Patient is a 77y old Female who presents with a chief complaint of afib (12 Jan 2023 17:47)  Currently admitted to medicine with the primary diagnosis of Pneumonia      INTERVAL HPI AND OVERNIGHT EVENTS:  Patient was examined and seen at bedside. This morning she is resting comfortably in bed and reports no issues or overnight events.    REVIEW OF SYMPTOMS:  Denies all.     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)      ALLERGIES:  No Known Allergies    MEDICATIONS:  STANDING MEDICATIONS  citalopram 20 milliGRAM(s) Oral daily  enoxaparin Injectable 90 milliGRAM(s) SubCutaneous every 12 hours  furosemide   Injectable 40 milliGRAM(s) IV Push two times a day  influenza  Vaccine (HIGH DOSE) 0.7 milliLiter(s) IntraMuscular once  levETIRAcetam 500 milliGRAM(s) Oral two times a day  losartan 25 milliGRAM(s) Oral daily  metoprolol tartrate 50 milliGRAM(s) Oral three times a day  nystatin Cream 1 Application(s) Topical two times a day  phenytoin   Capsule 100 milliGRAM(s) Oral three times a day  spironolactone 25 milliGRAM(s) Oral daily    PRN MEDICATIONS      VITAL SIGNS: Last 24 Hours  T(C): 36.3 (13 Jan 2023 05:04), Max: 36.3 (13 Jan 2023 05:04)  T(F): 97.3 (13 Jan 2023 05:04), Max: 97.3 (13 Jan 2023 05:04)  HR: 120 (13 Jan 2023 05:04) (69 - 120)  BP: 108/65 (13 Jan 2023 05:04) (102/71 - 128/55)  BP(mean): --  RR: 18 (13 Jan 2023 05:04) (18 - 18)  SpO2: --    LABS:                        14.0   7.26  )-----------( 206      ( 13 Jan 2023 07:49 )             43.1     01-13    139  |  94<L>  |  23<H>  ----------------------------<  92  3.7   |  36<H>  |  1.5    Ca    8.7      13 Jan 2023 07:49  Mg     2.5     01-13    TPro  5.6<L>  /  Alb  3.7  /  TBili  0.6  /  DBili  x   /  AST  27  /  ALT  23  /  AlkPhos  113  01-13      PHYSICAL EXAM:  CONSTITUTIONAL: No acute distress, well-developed, well-groomed, AAOx0  HEAD: Atraumatic, normocephalic  EYES: EOM intact, PERRLA, conjunctiva and sclera clear  ENT: No JVD; moist mucous membranes  PULMONARY: Clear to auscultation bilaterally  CARDIOVASCULAR: Regular rate and rhythm  GASTROINTESTINAL: Soft, non-tender, non-distended  MUSCULOSKELETAL: 2+ peripheral pulses; +1 lower extremity edema to the midcalf   NEUROLOGY: non-focal  SKIN: No rashes or lesions; warm and dry    ASSESSMENT & PLAN:  77F PMHx CVA c/b aphasia, seizure disorder, HTN here with shortness of breath.    #Shortness of breath, presumed due to acute HFrEF  - CXR R base opacity, effusion  - No hypoxia, satting well on ra  - TTE with ef 28%, rv dysfunction; no baseline to compare  - Patient diuresing appropriately     Plan  - Lasix 40 switch from IV to PO  - Start aldactone 25mg daily   - Repeat cxr  - F/u cards recommendations      #AFib with RVR, new onset  - Heart Failure likely 2/2 tachycardia    Plan:  - Therapeutic lovenox  - Increase Lopressor to 50mg bid  - Going today for DCCV    #CVA  - Celexa 20  - Therapeutic lovenox    #Seizure disorder  - Keppra 500 bid  - Phenytoin 100 tid    #HTN  - Outpt f/u    #Misc  - DVT Prophylaxis: Lovenox  - GI Prophylaxis: None  - Diet: Dash  - Activity: As tolerated  - IV Fluids: None  - Code Status: Full    Dispo: Acute pending IV diuresis, HF consult, and possible DCCV

## 2023-01-13 NOTE — CHART NOTE - NSCHARTNOTEFT_GEN_A_CORE
Pt taken to procedure room, Underwent sedation by anesthesia. Multiple attempts to intubate esophagus with JOSE C probe unsuccessful. Procedure aborted  Recommend CTA to r/o RYANN thrombus then reschedule for Cardioversion only

## 2023-01-13 NOTE — PROVIDER CONTACT NOTE (OTHER) - SITUATION
MD aware about the BP 96/63 and the , as per MD to give metoprolol, no other interventions, will continue monitor.

## 2023-01-14 LAB
ALBUMIN SERPL ELPH-MCNC: 3.8 G/DL — SIGNIFICANT CHANGE UP (ref 3.5–5.2)
ALP SERPL-CCNC: 122 U/L — HIGH (ref 30–115)
ALT FLD-CCNC: 24 U/L — SIGNIFICANT CHANGE UP (ref 0–41)
ANION GAP SERPL CALC-SCNC: 8 MMOL/L — SIGNIFICANT CHANGE UP (ref 7–14)
AST SERPL-CCNC: 28 U/L — SIGNIFICANT CHANGE UP (ref 0–41)
BASOPHILS # BLD AUTO: 0.04 K/UL — SIGNIFICANT CHANGE UP (ref 0–0.2)
BASOPHILS NFR BLD AUTO: 0.5 % — SIGNIFICANT CHANGE UP (ref 0–1)
BILIRUB SERPL-MCNC: 0.8 MG/DL — SIGNIFICANT CHANGE UP (ref 0.2–1.2)
BUN SERPL-MCNC: 21 MG/DL — HIGH (ref 10–20)
CALCIUM SERPL-MCNC: 8.8 MG/DL — SIGNIFICANT CHANGE UP (ref 8.4–10.4)
CHLORIDE SERPL-SCNC: 94 MMOL/L — LOW (ref 98–110)
CO2 SERPL-SCNC: 33 MMOL/L — HIGH (ref 17–32)
CREAT SERPL-MCNC: 1.2 MG/DL — SIGNIFICANT CHANGE UP (ref 0.7–1.5)
EGFR: 47 ML/MIN/1.73M2 — LOW
EOSINOPHIL # BLD AUTO: 0.13 K/UL — SIGNIFICANT CHANGE UP (ref 0–0.7)
EOSINOPHIL NFR BLD AUTO: 1.7 % — SIGNIFICANT CHANGE UP (ref 0–8)
GLUCOSE SERPL-MCNC: 127 MG/DL — HIGH (ref 70–99)
HCT VFR BLD CALC: 43.9 % — SIGNIFICANT CHANGE UP (ref 37–47)
HGB BLD-MCNC: 14 G/DL — SIGNIFICANT CHANGE UP (ref 12–16)
IMM GRANULOCYTES NFR BLD AUTO: 0.3 % — SIGNIFICANT CHANGE UP (ref 0.1–0.3)
LYMPHOCYTES # BLD AUTO: 1.24 K/UL — SIGNIFICANT CHANGE UP (ref 1.2–3.4)
LYMPHOCYTES # BLD AUTO: 16.3 % — LOW (ref 20.5–51.1)
MAGNESIUM SERPL-MCNC: 2.2 MG/DL — SIGNIFICANT CHANGE UP (ref 1.8–2.4)
MCHC RBC-ENTMCNC: 28 PG — SIGNIFICANT CHANGE UP (ref 27–31)
MCHC RBC-ENTMCNC: 31.9 G/DL — LOW (ref 32–37)
MCV RBC AUTO: 87.8 FL — SIGNIFICANT CHANGE UP (ref 81–99)
MONOCYTES # BLD AUTO: 0.98 K/UL — HIGH (ref 0.1–0.6)
MONOCYTES NFR BLD AUTO: 12.9 % — HIGH (ref 1.7–9.3)
NEUTROPHILS # BLD AUTO: 5.21 K/UL — SIGNIFICANT CHANGE UP (ref 1.4–6.5)
NEUTROPHILS NFR BLD AUTO: 68.3 % — SIGNIFICANT CHANGE UP (ref 42.2–75.2)
NRBC # BLD: 0 /100 WBCS — SIGNIFICANT CHANGE UP (ref 0–0)
PLATELET # BLD AUTO: 195 K/UL — SIGNIFICANT CHANGE UP (ref 130–400)
POTASSIUM SERPL-MCNC: 3.3 MMOL/L — LOW (ref 3.5–5)
POTASSIUM SERPL-SCNC: 3.3 MMOL/L — LOW (ref 3.5–5)
PROT SERPL-MCNC: 6.3 G/DL — SIGNIFICANT CHANGE UP (ref 6–8)
RBC # BLD: 5 M/UL — SIGNIFICANT CHANGE UP (ref 4.2–5.4)
RBC # FLD: 15 % — HIGH (ref 11.5–14.5)
SODIUM SERPL-SCNC: 135 MMOL/L — SIGNIFICANT CHANGE UP (ref 135–146)
WBC # BLD: 7.62 K/UL — SIGNIFICANT CHANGE UP (ref 4.8–10.8)
WBC # FLD AUTO: 7.62 K/UL — SIGNIFICANT CHANGE UP (ref 4.8–10.8)

## 2023-01-14 PROCEDURE — 75572 CT HRT W/3D IMAGE: CPT | Mod: 26

## 2023-01-14 PROCEDURE — 99233 SBSQ HOSP IP/OBS HIGH 50: CPT

## 2023-01-14 RX ORDER — POTASSIUM CHLORIDE 20 MEQ
40 PACKET (EA) ORAL EVERY 4 HOURS
Refills: 0 | Status: DISCONTINUED | OUTPATIENT
Start: 2023-01-14 | End: 2023-01-14

## 2023-01-14 RX ORDER — POTASSIUM CHLORIDE 20 MEQ
40 PACKET (EA) ORAL EVERY 4 HOURS
Refills: 0 | Status: COMPLETED | OUTPATIENT
Start: 2023-01-14 | End: 2023-01-14

## 2023-01-14 RX ADMIN — Medication 40 MILLIGRAM(S): at 14:01

## 2023-01-14 RX ADMIN — Medication 100 MILLIGRAM(S): at 21:41

## 2023-01-14 RX ADMIN — Medication 40 MILLIEQUIVALENT(S): at 12:01

## 2023-01-14 RX ADMIN — Medication 40 MILLIEQUIVALENT(S): at 10:00

## 2023-01-14 RX ADMIN — Medication 40 MILLIEQUIVALENT(S): at 17:49

## 2023-01-14 RX ADMIN — ENOXAPARIN SODIUM 90 MILLIGRAM(S): 100 INJECTION SUBCUTANEOUS at 05:17

## 2023-01-14 RX ADMIN — Medication 50 MILLIGRAM(S): at 21:41

## 2023-01-14 RX ADMIN — Medication 40 MILLIGRAM(S): at 05:17

## 2023-01-14 RX ADMIN — ENOXAPARIN SODIUM 90 MILLIGRAM(S): 100 INJECTION SUBCUTANEOUS at 17:47

## 2023-01-14 RX ADMIN — NYSTATIN CREAM 1 APPLICATION(S): 100000 CREAM TOPICAL at 05:19

## 2023-01-14 RX ADMIN — Medication 100 MILLIGRAM(S): at 05:16

## 2023-01-14 RX ADMIN — Medication 50 MILLIGRAM(S): at 05:16

## 2023-01-14 RX ADMIN — Medication 100 MILLIGRAM(S): at 14:13

## 2023-01-14 RX ADMIN — LEVETIRACETAM 500 MILLIGRAM(S): 250 TABLET, FILM COATED ORAL at 05:16

## 2023-01-14 RX ADMIN — LEVETIRACETAM 500 MILLIGRAM(S): 250 TABLET, FILM COATED ORAL at 17:48

## 2023-01-14 RX ADMIN — NYSTATIN CREAM 1 APPLICATION(S): 100000 CREAM TOPICAL at 17:55

## 2023-01-14 RX ADMIN — SPIRONOLACTONE 25 MILLIGRAM(S): 25 TABLET, FILM COATED ORAL at 05:16

## 2023-01-14 RX ADMIN — CITALOPRAM 20 MILLIGRAM(S): 10 TABLET, FILM COATED ORAL at 11:19

## 2023-01-14 RX ADMIN — Medication 50 MILLIGRAM(S): at 14:13

## 2023-01-14 NOTE — PROGRESS NOTE ADULT - ASSESSMENT
78 yo F PMHx CVA, seizure disorder, HTN presented for evaluation of shortness of breath.    Dypsnea due to HFrEF, afib rvr  Acute HFrEF: new diagnosis.   SOB, leg edema.   -Echo showed LVEF 28%, severe reduced RV systolic function, mod to severe MR, mild to mod AR, mild TR, mod pulmonary HTN,    -s/p IV lasix, change to PO in am, c/w Aldactone 25mg daily. PT appears euvolemic  -Continue Metoprolol and Losartan.  -Per Cardiology cardiomyopathy is likely from tachycardia, outpt work up  -DCCV was scheduled  but echo probe unable to be advanced. CTA left appendage obtained and there is no evidence of thrombs  -c/w Low sodium diet, daily weights, strict I&O    New Paroxysmal Atrial Fibrillation with Rapid Ventricular Response:   -HR is not well controlled. -->RVR overnight, controlled presently  -Echo showed severe biatrial enlargement.   -c/w Metoprolol to 50mg BID, Continue Lovenox, plan for cta LA and cardioversion       History of CVA  -Family Reports expressive aphasia, Not on ASA or Statin ,     Seizure disorder  -Continue Keppra 500 bid and phenytoin 100 tid      DVT Prophylaxis: Lovenox  GI Prophylaxis: None  Code Status: Full    #Progress Note Handoff:  Pending (specify):  dccv  Family discussion: with her brother and sister-in-law   Disposition: Home.

## 2023-01-14 NOTE — PROGRESS NOTE ADULT - SUBJECTIVE AND OBJECTIVE BOX
CHIEF COMPLAINT:    Patient is a 77y old  Female who presents with a chief complaint of afib     INTERVAL HPI/OVERNIGHT EVENTS:    Patient seen and examined at bedside. No acute overnight events occurred.    ROS: Denies SOB, chest pain. All other systems are negative.    Medications:  Standing  citalopram 20 milliGRAM(s) Oral daily  enoxaparin Injectable 90 milliGRAM(s) SubCutaneous every 12 hours  furosemide   Injectable 40 milliGRAM(s) IV Push two times a day  influenza  Vaccine (HIGH DOSE) 0.7 milliLiter(s) IntraMuscular once  levETIRAcetam 500 milliGRAM(s) Oral two times a day  losartan 25 milliGRAM(s) Oral daily  metoprolol tartrate 50 milliGRAM(s) Oral three times a day  nystatin Cream 1 Application(s) Topical two times a day  phenytoin   Capsule 100 milliGRAM(s) Oral three times a day  potassium chloride   Powder 40 milliEquivalent(s) Oral every 4 hours  spironolactone 25 milliGRAM(s) Oral daily    PRN Meds        Vital Signs:    T(F): 98 (01-13-23 @ 20:08), Max: 98 (01-13-23 @ 20:08)  HR: 108 (01-14-23 @ 08:08) (104 - 130)  BP: 99/68 (01-14-23 @ 08:08) (95/56 - 103/67)  RR: 18 (01-13-23 @ 20:08) (18 - 18)  SpO2: 95% (01-13-23 @ 20:39) (95% - 95%)  I&O's Summary    13 Jan 2023 07:01  -  14 Jan 2023 07:00  --------------------------------------------------------  IN: 537 mL / OUT: 1400 mL / NET: -863 mL      PHYSICAL EXAM:  GENERAL:  NAD  SKIN: No rashes or lesions  HEENT: Atraumatic. Normocephalic. Anicteric  NECK:  No JVD.   PULMONARY: Clear to ausculation bilaterally. No wheezing. No rales  CVS: Normal S1, S2. Regular rate and rhythm. No murmurs.  ABDOMEN/GI: Soft, Nontender, Nondistended; Bowel sounds are present  EXTREMITIES:  No edema B/L LE.  NEUROLOGIC:  No motor deficit.  PSYCH: Alert & oriented x 3, normal affect      LABS:                        14.0   7.62  )-----------( 195      ( 14 Jan 2023 05:29 )             43.9     01-14    135  |  94<L>  |  21<H>  ----------------------------<  127<H>  3.3<L>   |  33<H>  |  1.2    Ca    8.8      14 Jan 2023 05:29  Mg     2.2     01-14    TPro  6.3  /  Alb  3.8  /  TBili  0.8  /  DBili  x   /  AST  28  /  ALT  24  /  AlkPhos  122<H>  01-14      RADIOLOGY & ADDITIONAL TESTS:  Imaging or report Personally Reviewed:  [ ] YES  [ ] NO -->no new images    Telemetry reviewed independently - afib  EKG reviewed independently -->no new EKGs    Consultant(s) Notes Reviewed:  [ ] YES  [ ] NO  Care Discussed with Consultants/Other Providers [ ] YES  [ ] NO    Case discussed with resident  Care discussed with pt

## 2023-01-14 NOTE — PROGRESS NOTE ADULT - SUBJECTIVE AND OBJECTIVE BOX
HOUSTON KING 77y Female  MRN#: [id number]   Hospital Day: 8d    SUBJECTIVE  Patient is a 77y old Female who presents with a chief complaint of afib (13 Jan 2023 17:39)  Currently admitted to medicine with the primary diagnosis of Pneumonia      INTERVAL HPI AND OVERNIGHT EVENTS:  Patient was examined and seen at bedside. This morning she is resting comfortably in bed and reports no issues or overnight events.    REVIEW OF SYMPTOMS:  Denies all.     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)      ALLERGIES:  No Known Allergies    MEDICATIONS:  STANDING MEDICATIONS  citalopram 20 milliGRAM(s) Oral daily  enoxaparin Injectable 90 milliGRAM(s) SubCutaneous every 12 hours  furosemide   Injectable 40 milliGRAM(s) IV Push two times a day  influenza  Vaccine (HIGH DOSE) 0.7 milliLiter(s) IntraMuscular once  levETIRAcetam 500 milliGRAM(s) Oral two times a day  losartan 25 milliGRAM(s) Oral daily  metoprolol tartrate 50 milliGRAM(s) Oral three times a day  nystatin Cream 1 Application(s) Topical two times a day  phenytoin   Capsule 100 milliGRAM(s) Oral three times a day  potassium chloride    Tablet ER 40 milliEquivalent(s) Oral every 4 hours  spironolactone 25 milliGRAM(s) Oral daily    PRN MEDICATIONS      VITAL SIGNS: Last 24 Hours  T(C): 36.7 (13 Jan 2023 20:08), Max: 36.7 (13 Jan 2023 20:08)  T(F): 98 (13 Jan 2023 20:08), Max: 98 (13 Jan 2023 20:08)  HR: 110 (14 Jan 2023 05:14) (104 - 130)  BP: 103/67 (14 Jan 2023 05:14) (95/56 - 108/65)  BP(mean): 70 (13 Jan 2023 11:26) (70 - 70)  RR: 18 (13 Jan 2023 20:08) (18 - 18)  SpO2: 95% (13 Jan 2023 20:39) (95% - 95%)    LABS:                        14.0   7.62  )-----------( 195      ( 14 Jan 2023 05:29 )             43.9     01-14    135  |  94<L>  |  21<H>  ----------------------------<  127<H>  3.3<L>   |  33<H>  |  1.2    Ca    8.8      14 Jan 2023 05:29  Mg     2.2     01-14    TPro  6.3  /  Alb  3.8  /  TBili  0.8  /  DBili  x   /  AST  28  /  ALT  24  /  AlkPhos  122<H>  01-14    PHYSICAL EXAM:  CONSTITUTIONAL: No acute distress, well-developed, well-groomed, AAOx0  HEAD: Atraumatic, normocephalic  EYES: EOM intact, PERRLA, conjunctiva and sclera clear  ENT: No JVD; moist mucous membranes  PULMONARY: Clear to auscultation bilaterally  CARDIOVASCULAR: Regular rate and rhythm  GASTROINTESTINAL: Soft, non-tender, non-distended  MUSCULOSKELETAL: 2+ peripheral pulses; +1 lower extremity edema to the midcalf   NEUROLOGY: non-focal  SKIN: No rashes or lesions; warm and dry    ASSESSMENT & PLAN:  77F PMHx CVA c/b aphasia, seizure disorder, HTN here with shortness of breath.    #Shortness of breath, presumed due to acute HFrEF  - CXR R base opacity, effusion  - No hypoxia, satting well on ra  - TTE with ef 28%, rv dysfunction; no baseline to compare  - Patient diuresing appropriately     Plan  - Lasix 40 switch from IV to PO  - Start aldactone 25mg daily   - Repeat cxr  - F/u cards recommendations    #AFib with RVR, new onset  - Heart Failure likely 2/2 tachycardia  - Patient attempted to get the JOSE C yesterday but due to some esophageal strictures -> recommended CT L atrial appendage     Plan:  - Therapeutic lovenox  - Increase Lopressor to 50mg bid  - F/u CT L atrial appendage     #CVA  - Celexa 20  - Therapeutic lovenox    #Seizure disorder  - Keppra 500 bid  - Phenytoin 100 tid    #HTN  - Outpt f/u    #Misc  - DVT Prophylaxis: Lovenox  - GI Prophylaxis: None  - Diet: Dash  - Activity: As tolerated  - IV Fluids: None  - Code Status: Full    Dispo: Acute pending IV diuresis, HF consult, and possible DCCV

## 2023-01-15 LAB
ALBUMIN SERPL ELPH-MCNC: 3.9 G/DL — SIGNIFICANT CHANGE UP (ref 3.5–5.2)
ALP SERPL-CCNC: 138 U/L — HIGH (ref 30–115)
ALT FLD-CCNC: 26 U/L — SIGNIFICANT CHANGE UP (ref 0–41)
ANION GAP SERPL CALC-SCNC: 12 MMOL/L — SIGNIFICANT CHANGE UP (ref 7–14)
AST SERPL-CCNC: 33 U/L — SIGNIFICANT CHANGE UP (ref 0–41)
BILIRUB SERPL-MCNC: 0.7 MG/DL — SIGNIFICANT CHANGE UP (ref 0.2–1.2)
BUN SERPL-MCNC: 25 MG/DL — HIGH (ref 10–20)
CALCIUM SERPL-MCNC: 8.9 MG/DL — SIGNIFICANT CHANGE UP (ref 8.4–10.4)
CHLORIDE SERPL-SCNC: 92 MMOL/L — LOW (ref 98–110)
CO2 SERPL-SCNC: 31 MMOL/L — SIGNIFICANT CHANGE UP (ref 17–32)
CREAT SERPL-MCNC: 1.3 MG/DL — SIGNIFICANT CHANGE UP (ref 0.7–1.5)
EGFR: 43 ML/MIN/1.73M2 — LOW
GLUCOSE SERPL-MCNC: 93 MG/DL — SIGNIFICANT CHANGE UP (ref 70–99)
MAGNESIUM SERPL-MCNC: 2.2 MG/DL — SIGNIFICANT CHANGE UP (ref 1.8–2.4)
POTASSIUM SERPL-MCNC: 4.4 MMOL/L — SIGNIFICANT CHANGE UP (ref 3.5–5)
POTASSIUM SERPL-SCNC: 4.4 MMOL/L — SIGNIFICANT CHANGE UP (ref 3.5–5)
PROT SERPL-MCNC: 6.5 G/DL — SIGNIFICANT CHANGE UP (ref 6–8)
SODIUM SERPL-SCNC: 135 MMOL/L — SIGNIFICANT CHANGE UP (ref 135–146)

## 2023-01-15 PROCEDURE — 99232 SBSQ HOSP IP/OBS MODERATE 35: CPT

## 2023-01-15 RX ORDER — FUROSEMIDE 40 MG
40 TABLET ORAL DAILY
Refills: 0 | Status: DISCONTINUED | OUTPATIENT
Start: 2023-01-16 | End: 2023-01-16

## 2023-01-15 RX ADMIN — LEVETIRACETAM 500 MILLIGRAM(S): 250 TABLET, FILM COATED ORAL at 05:49

## 2023-01-15 RX ADMIN — NYSTATIN CREAM 1 APPLICATION(S): 100000 CREAM TOPICAL at 17:17

## 2023-01-15 RX ADMIN — SPIRONOLACTONE 25 MILLIGRAM(S): 25 TABLET, FILM COATED ORAL at 05:49

## 2023-01-15 RX ADMIN — Medication 50 MILLIGRAM(S): at 05:49

## 2023-01-15 RX ADMIN — ENOXAPARIN SODIUM 90 MILLIGRAM(S): 100 INJECTION SUBCUTANEOUS at 17:17

## 2023-01-15 RX ADMIN — CITALOPRAM 20 MILLIGRAM(S): 10 TABLET, FILM COATED ORAL at 11:06

## 2023-01-15 RX ADMIN — Medication 100 MILLIGRAM(S): at 21:48

## 2023-01-15 RX ADMIN — LEVETIRACETAM 500 MILLIGRAM(S): 250 TABLET, FILM COATED ORAL at 17:16

## 2023-01-15 RX ADMIN — LOSARTAN POTASSIUM 25 MILLIGRAM(S): 100 TABLET, FILM COATED ORAL at 05:49

## 2023-01-15 RX ADMIN — ENOXAPARIN SODIUM 90 MILLIGRAM(S): 100 INJECTION SUBCUTANEOUS at 05:48

## 2023-01-15 RX ADMIN — Medication 100 MILLIGRAM(S): at 13:34

## 2023-01-15 RX ADMIN — Medication 50 MILLIGRAM(S): at 13:34

## 2023-01-15 RX ADMIN — Medication 40 MILLIGRAM(S): at 05:49

## 2023-01-15 RX ADMIN — Medication 40 MILLIGRAM(S): at 13:34

## 2023-01-15 RX ADMIN — NYSTATIN CREAM 1 APPLICATION(S): 100000 CREAM TOPICAL at 05:50

## 2023-01-15 RX ADMIN — Medication 100 MILLIGRAM(S): at 05:49

## 2023-01-15 NOTE — PROGRESS NOTE ADULT - ASSESSMENT
78 yo F PMHx CVA, seizure disorder, HTN presented for evaluation of shortness of breath.    Dypsnea due to HFrEF, afib rvr  Acute HFrEF: new diagnosis.   SOB, leg edema.   -Echo showed LVEF 28%, severe reduced RV systolic function, mod to severe MR, mild to mod AR, mild TR, mod pulmonary HTN,    -s/p IV lasix, change to PO in am, c/w Aldactone 25mg daily. PT appears euvolemic  -Continue Metoprolol and Losartan.  -Per Cardiology cardiomyopathy is likely from tachycardia, outpt work up  -DCCV was scheduled  but echo probe unable to be advanced. CTA left appendage obtained and there is no evidence of thrombsus. Pending DCCV. I spoke with cardio fellow, unlikely to be tomorrow, to call back tomorrow to see when she can be scheduled  -c/w Low sodium diet, daily weights, strict I&O    New Paroxysmal Atrial Fibrillation with Rapid Ventricular Response:   -HR is not well controlled. -->RVR overnight, controlled presently  -Echo showed severe biatrial enlargement.   -c/w Metoprolol to 50mg BID, Continue Lovenox, plan for cta LA and cardioversion       History of CVA  -Family Reports expressive aphasia, Not on ASA or Statin ,     Seizure disorder  -Continue Keppra 500 bid and phenytoin 100 tid      DVT Prophylaxis: Lovenox  GI Prophylaxis: None  Code Status: Full    #Progress Note Handoff:  Pending (specify):  dccv  Family discussion: with her brother and sister-in-law   Disposition: Home.

## 2023-01-15 NOTE — PROGRESS NOTE ADULT - SUBJECTIVE AND OBJECTIVE BOX
CHIEF COMPLAINT:    Patient is a 77y old  Female who presents with a chief complaint of afib    INTERVAL HPI/OVERNIGHT EVENTS:    Patient seen and examined at bedside. No acute overnight events occurred.    ROS: Denies palpitations. All other systems are negative.    Medications:  Standing  citalopram 20 milliGRAM(s) Oral daily  enoxaparin Injectable 90 milliGRAM(s) SubCutaneous every 12 hours  furosemide   Injectable 40 milliGRAM(s) IV Push two times a day  influenza  Vaccine (HIGH DOSE) 0.7 milliLiter(s) IntraMuscular once  levETIRAcetam 500 milliGRAM(s) Oral two times a day  losartan 25 milliGRAM(s) Oral daily  metoprolol tartrate 50 milliGRAM(s) Oral three times a day  nystatin Cream 1 Application(s) Topical two times a day  phenytoin   Capsule 100 milliGRAM(s) Oral three times a day  spironolactone 25 milliGRAM(s) Oral daily    PRN Meds        Vital Signs:    T(F): 97.1 (01-15-23 @ 04:40), Max: 97.1 (01-15-23 @ 04:40)  HR: 114 (01-15-23 @ 04:40) (104 - 115)  BP: 106/65 (01-15-23 @ 04:40) (82/51 - 129/68)  RR: --  SpO2: 96% (01-15-23 @ 08:00) (96% - 96%)  I&O's Summary    2023 07:01  -  15 Jarocho 2023 07:00  --------------------------------------------------------  IN: 221 mL / OUT: 0 mL / NET: 221 mL      Daily     Daily Weight in k (15 Jarocho 2023 04:40)  CAPILLARY BLOOD GLUCOSE          PHYSICAL EXAM:  GENERAL:  NAD  SKIN: Incontinence associated dermatitis  HEENT: Atraumatic. Normocephalic. Anicteric  NECK:  No JVD.   PULMONARY: Clear to ausculation bilaterally. No wheezing. No rales  CVS: Normal S1, S2. Regular rate and rhythm. No murmurs.  ABDOMEN/GI: Soft, Nontender, Nondistended; Bowel sounds are present  EXTREMITIES:  No edema B/L LE.  NEUROLOGIC:  No motor deficit.  PSYCH: Alert & oriented x 3, normal affect      LABS:                        14.0   7.62  )-----------( 195      ( 2023 05:29 )             43.9         135  |  94<L>  |  21<H>  ----------------------------<  127<H>  3.3<L>   |  33<H>  |  1.2    Ca    8.8      2023 05:29  Mg     2.2         TPro  6.3  /  Alb  3.8  /  TBili  0.8  /  DBili  x   /  AST  28  /  ALT  24  /  AlkPhos  122<H>                RADIOLOGY & ADDITIONAL TESTS:  Imaging or report Personally Reviewed:  [ ] YES  [ ] NO -->no new images    Telemetry reviewed independently - afib rvr overnight  EKG reviewed independently -->no new EKGs    Consultant(s) Notes Reviewed:  [ ] YES  [ ] NO  Care Discussed with Consultants/Other Providers [ ] YES  [ ] NO    Case discussed with resident  Care discussed with pt

## 2023-01-16 LAB
ANION GAP SERPL CALC-SCNC: 12 MMOL/L — SIGNIFICANT CHANGE UP (ref 7–14)
BUN SERPL-MCNC: 26 MG/DL — HIGH (ref 10–20)
CALCIUM SERPL-MCNC: 9.1 MG/DL — SIGNIFICANT CHANGE UP (ref 8.4–10.5)
CHLORIDE SERPL-SCNC: 95 MMOL/L — LOW (ref 98–110)
CO2 SERPL-SCNC: 30 MMOL/L — SIGNIFICANT CHANGE UP (ref 17–32)
CREAT SERPL-MCNC: 1.3 MG/DL — SIGNIFICANT CHANGE UP (ref 0.7–1.5)
EGFR: 43 ML/MIN/1.73M2 — LOW
GLUCOSE SERPL-MCNC: 119 MG/DL — HIGH (ref 70–99)
HCT VFR BLD CALC: 41.5 % — SIGNIFICANT CHANGE UP (ref 37–47)
HGB BLD-MCNC: 13.5 G/DL — SIGNIFICANT CHANGE UP (ref 12–16)
MCHC RBC-ENTMCNC: 28.2 PG — SIGNIFICANT CHANGE UP (ref 27–31)
MCHC RBC-ENTMCNC: 32.5 G/DL — SIGNIFICANT CHANGE UP (ref 32–37)
MCV RBC AUTO: 86.8 FL — SIGNIFICANT CHANGE UP (ref 81–99)
NRBC # BLD: 0 /100 WBCS — SIGNIFICANT CHANGE UP (ref 0–0)
PLATELET # BLD AUTO: 204 K/UL — SIGNIFICANT CHANGE UP (ref 130–400)
POTASSIUM SERPL-MCNC: 3.4 MMOL/L — LOW (ref 3.5–5)
POTASSIUM SERPL-SCNC: 3.4 MMOL/L — LOW (ref 3.5–5)
RBC # BLD: 4.78 M/UL — SIGNIFICANT CHANGE UP (ref 4.2–5.4)
RBC # FLD: 15 % — HIGH (ref 11.5–14.5)
SARS-COV-2 RNA SPEC QL NAA+PROBE: SIGNIFICANT CHANGE UP
SODIUM SERPL-SCNC: 137 MMOL/L — SIGNIFICANT CHANGE UP (ref 135–146)
WBC # BLD: 6.36 K/UL — SIGNIFICANT CHANGE UP (ref 4.8–10.8)
WBC # FLD AUTO: 6.36 K/UL — SIGNIFICANT CHANGE UP (ref 4.8–10.8)

## 2023-01-16 PROCEDURE — 99233 SBSQ HOSP IP/OBS HIGH 50: CPT

## 2023-01-16 RX ORDER — METOPROLOL TARTRATE 50 MG
25 TABLET ORAL ONCE
Refills: 0 | Status: COMPLETED | OUTPATIENT
Start: 2023-01-16 | End: 2023-01-16

## 2023-01-16 RX ORDER — POTASSIUM CHLORIDE 20 MEQ
40 PACKET (EA) ORAL EVERY 4 HOURS
Refills: 0 | Status: COMPLETED | OUTPATIENT
Start: 2023-01-16 | End: 2023-01-16

## 2023-01-16 RX ADMIN — Medication 100 MILLIGRAM(S): at 13:36

## 2023-01-16 RX ADMIN — Medication 40 MILLIEQUIVALENT(S): at 17:25

## 2023-01-16 RX ADMIN — Medication 100 MILLIGRAM(S): at 05:31

## 2023-01-16 RX ADMIN — Medication 100 MILLIGRAM(S): at 22:36

## 2023-01-16 RX ADMIN — CITALOPRAM 20 MILLIGRAM(S): 10 TABLET, FILM COATED ORAL at 13:36

## 2023-01-16 RX ADMIN — Medication 50 MILLIGRAM(S): at 05:50

## 2023-01-16 RX ADMIN — SPIRONOLACTONE 25 MILLIGRAM(S): 25 TABLET, FILM COATED ORAL at 05:50

## 2023-01-16 RX ADMIN — NYSTATIN CREAM 1 APPLICATION(S): 100000 CREAM TOPICAL at 05:51

## 2023-01-16 RX ADMIN — Medication 50 MILLIGRAM(S): at 22:36

## 2023-01-16 RX ADMIN — NYSTATIN CREAM 1 APPLICATION(S): 100000 CREAM TOPICAL at 17:23

## 2023-01-16 RX ADMIN — LEVETIRACETAM 500 MILLIGRAM(S): 250 TABLET, FILM COATED ORAL at 05:31

## 2023-01-16 RX ADMIN — Medication 40 MILLIEQUIVALENT(S): at 13:35

## 2023-01-16 RX ADMIN — LEVETIRACETAM 500 MILLIGRAM(S): 250 TABLET, FILM COATED ORAL at 17:25

## 2023-01-16 RX ADMIN — ENOXAPARIN SODIUM 90 MILLIGRAM(S): 100 INJECTION SUBCUTANEOUS at 17:25

## 2023-01-16 RX ADMIN — ENOXAPARIN SODIUM 90 MILLIGRAM(S): 100 INJECTION SUBCUTANEOUS at 05:30

## 2023-01-16 RX ADMIN — Medication 25 MILLIGRAM(S): at 18:03

## 2023-01-16 RX ADMIN — Medication 40 MILLIGRAM(S): at 05:31

## 2023-01-16 NOTE — PROGRESS NOTE ADULT - SUBJECTIVE AND OBJECTIVE BOX
HOUSTON KING 77y Female  MRN#: [id number]   Hospital Day: 10d    HPI:  77-year-old female past medical history of CVA, aphasic at baseline cannot provide detailed history, seizure prophylaxis (on phenytoin and Keppra), high blood pressure presents with generalized weakness over the past 4 days associated with shortness of breath and lower extremity bilateral worsening edema.  Per family at bedside on Tuesday approximately 4 days ago they went to check up on the patient as she was having nonbloody diarrhea and noticed she was short of breath spoke to the primary care doctor that they followed up with today and had an EKG done that showed new onset atrial fibrillation and was sent to the emergency department.     ED vitals:  /73, , Temp 98, satting 99% on 3L NC  Trop neg, BNP 5671  EKG Afib w/ RVR  CXR opacities, effusion    s/p Rocephin, Azithromycin, Lasix 40mg IV x1, Cardizem 20mg IV x1 in the ED.  Admitted for new onset fib. (06 Jan 2023 21:00)      SUBJECTIVE  Patient is a 77y old Female who presents with a chief complaint of afib (15 Jarocho 2023 14:13)  Currently admitted to medicine with the primary diagnosis of Pneumonia      INTERVAL HPI AND OVERNIGHT EVENTS:  Patient was examined and seen at bedside. This morning she is resting comfortably in bed and reports no issues or overnight events.        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)      ALLERGIES:  No Known Allergies    MEDICATIONS:  STANDING MEDICATIONS  citalopram 20 milliGRAM(s) Oral daily  enoxaparin Injectable 90 milliGRAM(s) SubCutaneous every 12 hours  furosemide    Tablet 40 milliGRAM(s) Oral daily  influenza  Vaccine (HIGH DOSE) 0.7 milliLiter(s) IntraMuscular once  levETIRAcetam 500 milliGRAM(s) Oral two times a day  metoprolol tartrate 50 milliGRAM(s) Oral three times a day  nystatin Cream 1 Application(s) Topical two times a day  phenytoin   Capsule 100 milliGRAM(s) Oral three times a day  spironolactone 25 milliGRAM(s) Oral daily    PRN MEDICATIONS      VITAL SIGNS: Last 24 Hours  T(C): 36.2 (16 Jan 2023 04:47), Max: 37.1 (15 Jarocho 2023 20:22)  T(F): 97.1 (16 Jan 2023 04:47), Max: 98.7 (15 Jarocho 2023 20:22)  HR: 102 (16 Jan 2023 04:47) (94 - 103)  BP: 90/52 (16 Jan 2023 04:47) (71/79 - 108/51)  BP(mean): --  RR: 18 (16 Jan 2023 04:47) (18 - 18)  SpO2: 98% (16 Jan 2023 08:07) (98% - 98%)    LABS:                        13.5   6.36  )-----------( 204      ( 16 Jan 2023 07:51 )             41.5     01-15    135  |  92<L>  |  25<H>  ----------------------------<  93  4.4   |  31  |  1.3    Ca    8.9      15 Jarocho 2023 16:00  Mg     2.2     01-15    TPro  6.5  /  Alb  3.9  /  TBili  0.7  /  DBili  x   /  AST  33  /  ALT  26  /  AlkPhos  138<H>  01-15          PHYSICAL EXAM:  CONSTITUTIONAL: No acute distress,AAOx3  HEAD: Atraumatic, normocephalic  EYES: EOM intact, PERRLA, conjunctiva and sclera clear  ENT: Supple, no masses, no thyromegaly, no bruits, no JVD; moist mucous membranes  PULMONARY: Clear to auscultation bilaterally; no wheezes, rales, or rhonchi  CARDIOVASCULAR: Regular rate and rhythm; no murmurs, rubs, or gallops  GASTROINTESTINAL: Soft, non-tender, non-distended; bowel sounds present  MUSCULOSKELETAL: 2+ peripheral pulses; +1 LE edema  NEUROLOGY: non-focal, moves all extremities     ASSESSMENT & PLAN  77F PMHx CVA c/b aphasia, seizure disorder, HTN here with shortness of breath.    #Shortness of breath, presumed due to acute HFrEF  - CXR R base opacity, effusion  - No hypoxia, satting well on ra  - TTE with ef 28%, rv dysfunction; no baseline to compare  - Patient diuresing appropriately   - Lasix 40 switch from IV to PO  - Started on aldactone 25mg daily       #AFib with RVR, new onset  - Heart Failure likely 2/2 tachycardia  - Patient attempted to get the JOSE C yesterday but due to some esophageal strictures -> recommended CT L atrial appendage   - Therapeutic lovenox  - c/w Lopressor to 50mg TID  - CT L atrial appendage no thrombus   - JOSE C and cardioversion possibly tomorrow     #CVA  - Celexa 20  - Therapeutic lovenox    #Seizure disorder  - Keppra 500 bid  - Phenytoin 100 tid    #HTN  - Outpt f/u    #Misc  - DVT Prophylaxis: Lovenox  - GI Prophylaxis: None  - Diet: Dash  - Activity: As tolerated  - IV Fluids: None  - Code Status: Full

## 2023-01-16 NOTE — PROGRESS NOTE ADULT - ASSESSMENT
76 yo F PMHx CVA, seizure disorder, HTN presented for evaluation of shortness of breath.    Dypsnea due to HFrEF, afib rvr  Acute HFrEF: new diagnosis.   SOB, leg edema.   -Echo showed LVEF 28%, severe reduced RV systolic function, mod to severe MR, mild to mod AR, mild TR, mod pulmonary HTN,    - holding lasix, spironolactone, losartan due to hypotension  -Continue Metoprolol   -Per Cardiology cardiomyopathy is likely from tachycardia, outpt work up  -DCCV was scheduled  but echo probe unable to be advanced. CTA left appendage obtained and there is no evidence of thrombsus. Pending DCCV hopefully tomorrow  -c/w Low sodium diet, daily weights, strict I&O    New Paroxysmal Atrial Fibrillation with Rapid Ventricular Response:   -HR is not well controlled. -->RVR overnight, controlled presently  -Echo showed severe biatrial enlargement.   - c/w metoprolol  - planned for DCCV tomorrow    Hypotension  - pt asymptomatic  - likely due to lasix, losartan, spironolactone  - hold antihypertensives for now  - could have element of dehydration from IVF      History of CVA  -Family Reports expressive aphasia, Not on ASA or Statin ,     Seizure disorder  -Continue Keppra 500 bid and phenytoin 100 tid      DVT Prophylaxis: Lovenox  GI Prophylaxis: None  Code Status: Full    #Progress Note Handoff:  Pending (specify):  dccv  Family discussion: with her brother and sister-in-law   Disposition: Home.

## 2023-01-16 NOTE — PROGRESS NOTE ADULT - SUBJECTIVE AND OBJECTIVE BOX
CHIEF COMPLAINT:    Patient is a 77y old  Female who presents with a chief complaint of afib    INTERVAL HPI/OVERNIGHT EVENTS:    Patient seen and examined at bedside. No acute overnight events occurred.    ROS: Denies SOB, chest pain, dizziness. All other systems are negative.    Medications:  Standing  citalopram 20 milliGRAM(s) Oral daily  enoxaparin Injectable 90 milliGRAM(s) SubCutaneous every 12 hours  influenza  Vaccine (HIGH DOSE) 0.7 milliLiter(s) IntraMuscular once  levETIRAcetam 500 milliGRAM(s) Oral two times a day  metoprolol tartrate 50 milliGRAM(s) Oral three times a day  nystatin Cream 1 Application(s) Topical two times a day  phenytoin   Capsule 100 milliGRAM(s) Oral three times a day  potassium chloride    Tablet ER 40 milliEquivalent(s) Oral every 4 hours    PRN Meds        Vital Signs:    T(F): 97.8 (23 @ 13:49), Max: 98.7 (01-15-23 @ 20:22)  HR: 103 (23 @ 13:49) (99 - 103)  BP: 83/50 (23 @ 13:49) (83/50 - 94/54)  RR: 18 (23 @ 13:49) (18 - 18)  SpO2: 98% (23 @ 08:07) (98% - 98%)  I&O's Summary    15 Jarocho 2023 07:01  -  2023 07:00  --------------------------------------------------------  IN: 0 mL / OUT: 1400 mL / NET: -1400 mL      Daily     Daily Weight in k (2023 04:47)  CAPILLARY BLOOD GLUCOSE          PHYSICAL EXAM:  GENERAL:  NAD  SKIN: No rashes or lesions  HEENT: Atraumatic. Normocephalic. Anicteric  NECK:  No JVD.   PULMONARY: Clear to ausculation bilaterally. No wheezing. No rales  CVS: Normal S1, S2. Regular rate and rhythm. No murmurs.  ABDOMEN/GI: Soft, Nontender, Nondistended; Bowel sounds are present  EXTREMITIES:  No edema B/L LE.  NEUROLOGIC:  No motor deficit.  PSYCH: Alert & oriented x 3, normal affect      LABS:                        13.5   6.36  )-----------( 204      ( 2023 07:51 )             41.5         137  |  95<L>  |  26<H>  ----------------------------<  119<H>  3.4<L>   |  30  |  1.3    Ca    9.1      2023 11:14  Mg     2.2     01-15    TPro  6.5  /  Alb  3.9  /  TBili  0.7  /  DBili  x   /  AST  33  /  ALT  26  /  AlkPhos  138<H>  01-15              RADIOLOGY & ADDITIONAL TESTS:  Imaging or report Personally Reviewed:  [ ] YES  [ ] NO -->no new images    Telemetry reviewed independently - NSR, no acute events  EKG reviewed independently -->no new EKGs    Consultant(s) Notes Reviewed:  [ ] YES  [ ] NO  Care Discussed with Consultants/Other Providers [ ] YES  [ ] NO    Case discussed with resident  Care discussed with pt

## 2023-01-17 LAB
ALBUMIN SERPL ELPH-MCNC: 3.8 G/DL — SIGNIFICANT CHANGE UP (ref 3.5–5.2)
ALP SERPL-CCNC: 126 U/L — HIGH (ref 30–115)
ALT FLD-CCNC: 28 U/L — SIGNIFICANT CHANGE UP (ref 0–41)
ANION GAP SERPL CALC-SCNC: 8 MMOL/L — SIGNIFICANT CHANGE UP (ref 7–14)
AST SERPL-CCNC: 34 U/L — SIGNIFICANT CHANGE UP (ref 0–41)
BASOPHILS # BLD AUTO: 0.05 K/UL — SIGNIFICANT CHANGE UP (ref 0–0.2)
BASOPHILS NFR BLD AUTO: 0.8 % — SIGNIFICANT CHANGE UP (ref 0–1)
BILIRUB SERPL-MCNC: 0.5 MG/DL — SIGNIFICANT CHANGE UP (ref 0.2–1.2)
BUN SERPL-MCNC: 29 MG/DL — HIGH (ref 10–20)
CALCIUM SERPL-MCNC: 9 MG/DL — SIGNIFICANT CHANGE UP (ref 8.4–10.4)
CHLORIDE SERPL-SCNC: 97 MMOL/L — LOW (ref 98–110)
CO2 SERPL-SCNC: 29 MMOL/L — SIGNIFICANT CHANGE UP (ref 17–32)
CREAT SERPL-MCNC: 1.3 MG/DL — SIGNIFICANT CHANGE UP (ref 0.7–1.5)
EGFR: 43 ML/MIN/1.73M2 — LOW
EOSINOPHIL # BLD AUTO: 0.17 K/UL — SIGNIFICANT CHANGE UP (ref 0–0.7)
EOSINOPHIL NFR BLD AUTO: 2.7 % — SIGNIFICANT CHANGE UP (ref 0–8)
GLUCOSE SERPL-MCNC: 97 MG/DL — SIGNIFICANT CHANGE UP (ref 70–99)
HCT VFR BLD CALC: 43.5 % — SIGNIFICANT CHANGE UP (ref 37–47)
HGB BLD-MCNC: 14.2 G/DL — SIGNIFICANT CHANGE UP (ref 12–16)
IMM GRANULOCYTES NFR BLD AUTO: 0.3 % — SIGNIFICANT CHANGE UP (ref 0.1–0.3)
LYMPHOCYTES # BLD AUTO: 1.51 K/UL — SIGNIFICANT CHANGE UP (ref 1.2–3.4)
LYMPHOCYTES # BLD AUTO: 24.2 % — SIGNIFICANT CHANGE UP (ref 20.5–51.1)
MAGNESIUM SERPL-MCNC: 2.3 MG/DL — SIGNIFICANT CHANGE UP (ref 1.8–2.4)
MCHC RBC-ENTMCNC: 28.8 PG — SIGNIFICANT CHANGE UP (ref 27–31)
MCHC RBC-ENTMCNC: 32.6 G/DL — SIGNIFICANT CHANGE UP (ref 32–37)
MCV RBC AUTO: 88.2 FL — SIGNIFICANT CHANGE UP (ref 81–99)
MONOCYTES # BLD AUTO: 0.98 K/UL — HIGH (ref 0.1–0.6)
MONOCYTES NFR BLD AUTO: 15.7 % — HIGH (ref 1.7–9.3)
NEUTROPHILS # BLD AUTO: 3.52 K/UL — SIGNIFICANT CHANGE UP (ref 1.4–6.5)
NEUTROPHILS NFR BLD AUTO: 56.3 % — SIGNIFICANT CHANGE UP (ref 42.2–75.2)
NRBC # BLD: 0 /100 WBCS — SIGNIFICANT CHANGE UP (ref 0–0)
PLATELET # BLD AUTO: 229 K/UL — SIGNIFICANT CHANGE UP (ref 130–400)
POTASSIUM SERPL-MCNC: 3.9 MMOL/L — SIGNIFICANT CHANGE UP (ref 3.5–5)
POTASSIUM SERPL-SCNC: 3.9 MMOL/L — SIGNIFICANT CHANGE UP (ref 3.5–5)
PROT SERPL-MCNC: 6.2 G/DL — SIGNIFICANT CHANGE UP (ref 6–8)
RBC # BLD: 4.93 M/UL — SIGNIFICANT CHANGE UP (ref 4.2–5.4)
RBC # FLD: 14.9 % — HIGH (ref 11.5–14.5)
SODIUM SERPL-SCNC: 134 MMOL/L — LOW (ref 135–146)
WBC # BLD: 6.25 K/UL — SIGNIFICANT CHANGE UP (ref 4.8–10.8)
WBC # FLD AUTO: 6.25 K/UL — SIGNIFICANT CHANGE UP (ref 4.8–10.8)

## 2023-01-17 PROCEDURE — 93010 ELECTROCARDIOGRAM REPORT: CPT

## 2023-01-17 PROCEDURE — 99233 SBSQ HOSP IP/OBS HIGH 50: CPT

## 2023-01-17 PROCEDURE — 92960 CARDIOVERSION ELECTRIC EXT: CPT

## 2023-01-17 RX ADMIN — Medication 50 MILLIGRAM(S): at 06:10

## 2023-01-17 RX ADMIN — CITALOPRAM 20 MILLIGRAM(S): 10 TABLET, FILM COATED ORAL at 16:33

## 2023-01-17 RX ADMIN — LEVETIRACETAM 500 MILLIGRAM(S): 250 TABLET, FILM COATED ORAL at 06:10

## 2023-01-17 RX ADMIN — ENOXAPARIN SODIUM 90 MILLIGRAM(S): 100 INJECTION SUBCUTANEOUS at 06:11

## 2023-01-17 RX ADMIN — LEVETIRACETAM 500 MILLIGRAM(S): 250 TABLET, FILM COATED ORAL at 17:50

## 2023-01-17 RX ADMIN — ENOXAPARIN SODIUM 90 MILLIGRAM(S): 100 INJECTION SUBCUTANEOUS at 17:50

## 2023-01-17 RX ADMIN — NYSTATIN CREAM 1 APPLICATION(S): 100000 CREAM TOPICAL at 06:13

## 2023-01-17 RX ADMIN — Medication 50 MILLIGRAM(S): at 21:33

## 2023-01-17 RX ADMIN — Medication 100 MILLIGRAM(S): at 06:10

## 2023-01-17 RX ADMIN — Medication 100 MILLIGRAM(S): at 21:33

## 2023-01-17 RX ADMIN — Medication 100 MILLIGRAM(S): at 16:33

## 2023-01-17 RX ADMIN — NYSTATIN CREAM 1 APPLICATION(S): 100000 CREAM TOPICAL at 17:49

## 2023-01-17 NOTE — PROGRESS NOTE ADULT - SUBJECTIVE AND OBJECTIVE BOX
CHIEF COMPLAINT:    Patient is a 77y old  Female who presents with a chief complaint of afib     INTERVAL HPI/OVERNIGHT EVENTS:    Patient seen and examined at bedside. No acute overnight events occurred.    ROS: Denies sob, chest pain. All other systems are negative.    Medications:  Standing  citalopram 20 milliGRAM(s) Oral daily  enoxaparin Injectable 90 milliGRAM(s) SubCutaneous every 12 hours  influenza  Vaccine (HIGH DOSE) 0.7 milliLiter(s) IntraMuscular once  levETIRAcetam 500 milliGRAM(s) Oral two times a day  metoprolol tartrate 50 milliGRAM(s) Oral three times a day  nystatin Cream 1 Application(s) Topical two times a day  phenytoin   Capsule 100 milliGRAM(s) Oral three times a day    PRN Meds        Vital Signs:    T(F): 97.5 (23 @ 04:48), Max: 97.5 (23 @ 04:48)  HR: 85 (23 @ 16:31) (85 - 108)  BP: 93/55 (23 @ 16:31) (82/55 - 108/57)  RR: 18 (23 @ 13:54) (18 - 18)  SpO2: 98% (23 @ 13:54) (98% - 98%)  I&O's Summary    2023 07:01  -  2023 07:00  --------------------------------------------------------  IN: 0 mL / OUT: 850 mL / NET: -850 mL      Daily     Daily Weight in k (2023 04:48)  CAPILLARY BLOOD GLUCOSE          PHYSICAL EXAM:  GENERAL:  NAD  SKIN: No rashes or lesions  HEENT: Atraumatic. Normocephalic. Anicteric  NECK:  No JVD.   PULMONARY: Clear to ausculation bilaterally. No wheezing. No rales  CVS: Normal S1, S2. Regular rate and rhythm. No murmurs.  ABDOMEN/GI: Soft, Nontender, Nondistended; Bowel sounds are present  EXTREMITIES:  No edema B/L LE.  NEUROLOGIC:  No motor deficit.  PSYCH: Alert & oriented x 3, normal affect      LABS:                        14.2   6.25  )-----------( 229      ( 2023 05:47 )             43.5         134<L>  |  97<L>  |  29<H>  ----------------------------<  97  3.9   |  29  |  1.3    Ca    9.0      2023 05:47  Mg     2.3         TPro  6.2  /  Alb  3.8  /  TBili  0.5  /  DBili  x   /  AST  34  /  ALT  28  /  AlkPhos  126<H>                RADIOLOGY & ADDITIONAL TESTS:  Imaging or report Personally Reviewed:  [ ] YES  [ ] NO -->no new images    Telemetry reviewed independently - afib rvr  EKG reviewed independently -->no new EKGs    Consultant(s) Notes Reviewed:  [ ] YES  [ ] NO  Care Discussed with Consultants/Other Providers [ ] YES  [ ] NO    Case discussed with resident  Care discussed with pt

## 2023-01-17 NOTE — CHART NOTE - NSCHARTNOTEFT_GEN_A_CORE
PACU ANESTHESIA ADMISSION NOTE      Procedure:   Post op diagnosis:      ____  Intubated  TV:______       Rate: ______      FiO2: ______    __x__  Patent Airway    x____  Full return of protective reflexes    ___x_  Full recovery from anesthesia / back to baseline     Vitals:   T:  98         R16:                  BP:  107/52                Sat:   98                P:60       Mental Status:  __x__ Awake   _____ Alert   _____ Drowsy   _____ Sedated    Nausea/Vomiting:  ____ NO  ______Yes,   See Post - Op Orders          Pain Scale (0-10):  _____    Treatment: ____ None    ____ See Post - Op/PCA Orders    Post - Operative Fluids:   ____ Oral   ____ See Post - Op Orders    Plan: Discharge:   ____Home  x     _____Floor     _____Critical Care    _____  Other:_________________    Comments:

## 2023-01-17 NOTE — PRE-ANESTHESIA EVALUATION ADULT - NSANTHOSAYNRD_GEN_A_CORE
No. GEORGE screening performed.  STOP BANG Legend: 0-2 = LOW Risk; 3-4 = INTERMEDIATE Risk; 5-8 = HIGH Risk
No. GEORGE screening performed.  STOP BANG Legend: 0-2 = LOW Risk; 3-4 = INTERMEDIATE Risk; 5-8 = HIGH Risk

## 2023-01-17 NOTE — PROGRESS NOTE ADULT - ASSESSMENT
76 yo F PMHx CVA, seizure disorder, HTN presented for evaluation of shortness of breath.    Dypsnea due to HFrEF, afib rvr  Acute HFrEF: new diagnosis.   SOB, leg edema.   -Echo showed LVEF 28%, severe reduced RV systolic function, mod to severe MR, mild to mod AR, mild TR, mod pulmonary HTN,    - holding lasix, spironolactone, losartan due to hypotension  -Continue Metoprolol   -Per Cardiology cardiomyopathy is likely from tachycardia, outpt work up  -DCCV was scheduled  but echo probe unable to be advanced. CTA left appendage obtained and there is no evidence of thrombsus. Pending DCCV hopefully tomorrow  -c/w Low sodium diet, daily weights, strict I&O    New Paroxysmal Atrial Fibrillation with Rapid Ventricular Response:   -HR is not well controlled. -->RVR overnight, controlled presently  -Echo showed severe biatrial enlargement.   - c/w metoprolol  - planned for DCCV today    Hypotension  - pt asymptomatic  - likely due to lasix, losartan, spironolactone, afib rvr  - continue to hold antihypertensives for now  - family reports pt had resistant hypertension in past and all agents were removed as she became very sensitive to it    History of CVA  -Family Reports expressive aphasia, Not on ASA or Statin ,     Seizure disorder  -Continue Keppra 500 bid and phenytoin 100 tid      DVT Prophylaxis: Lovenox  GI Prophylaxis: None  Code Status: Full    #Progress Note Handoff:  Pending (specify):  dccv, placement  Family discussion: with her brother and sister-in-law   Disposition: SNF

## 2023-01-17 NOTE — PROGRESS NOTE ADULT - SUBJECTIVE AND OBJECTIVE BOX
INTERNAL MEDICINE PROGRESS NOTE  HOUSTON KING 77y Female  MRN#: [id number]     Hospital Day: 11d  Pt is currently admitted with the primary diagnosis of HFrEF/Afib    SUBJECTIVE  Overnight events: None; no events on telemetry, monitor showed afib with HR 90's-100's.    Subjective complaints  Patient was evaluated this morning. Reports no chest pain, sob, n,v,d,f,c.  Pending JOSE C and cardioversion.                                           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)                                                ALLERGIES:  No Known Allergies                           HOME MEDICATIONS  Home Medications:  candesartan-hydrochlorothiazide 32 mg-25 mg oral tablet: 1 tab(s) orally once a day (06 Jan 2023 22:59)  citalopram 20 mg oral tablet: 1 tab(s) orally once a day (06 Jan 2023 22:59)  hydrALAZINE 50 mg oral tablet: 1 tab(s) orally 3 times a day (06 Jan 2023 23:00)  Keppra 500 mg oral tablet: 1 tab(s) orally 2 times a day (06 Jan 2023 22:59)  labetalol 300 mg oral tablet: 1 tab(s) orally 2 times a day (06 Jan 2023 22:59)  phenytoin 100 mg oral capsule: 1 tab(s) orally 3 times a day (06 Jan 2023 22:59)                           MEDICATIONS:  STANDING MEDICATIONS  citalopram 20 milliGRAM(s) Oral daily  enoxaparin Injectable 90 milliGRAM(s) SubCutaneous every 12 hours  influenza  Vaccine (HIGH DOSE) 0.7 milliLiter(s) IntraMuscular once  levETIRAcetam 500 milliGRAM(s) Oral two times a day  metoprolol tartrate 50 milliGRAM(s) Oral three times a day  nystatin Cream 1 Application(s) Topical two times a day  phenytoin   Capsule 100 milliGRAM(s) Oral three times a day    PRN MEDICATIONS                                            ------------------------------------------------------------  VITAL SIGNS: Last 24 Hours  T(C): 36.4 (17 Jan 2023 04:48), Max: 36.6 (16 Jan 2023 13:49)  T(F): 97.5 (17 Jan 2023 04:48), Max: 97.8 (16 Jan 2023 13:49)  HR: 108 (17 Jan 2023 04:48) (100 - 108)  BP: 104/57 (17 Jan 2023 04:48) (82/55 - 108/57)  BP(mean): 63 (16 Jan 2023 17:21) (63 - 63)  RR: 18 (17 Jan 2023 04:48) (18 - 18)  SpO2: --      01-16-23 @ 07:01  -  01-17-23 @ 07:00  --------------------------------------------------------  IN: 0 mL / OUT: 850 mL / NET: -850 mL                                               LABS:                        14.2   6.25  )-----------( 229      ( 17 Jan 2023 05:47 )             43.5     01-17    134<L>  |  97<L>  |  29<H>  ----------------------------<  97  3.9   |  29  |  1.3    Ca    9.0      17 Jan 2023 05:47  Mg     2.3     01-17    TPro  6.2  /  Alb  3.8  /  TBili  0.5  /  DBili  x   /  AST  34  /  ALT  28  /  AlkPhos  126<H>  01-17                                                              RADIOLOGY:    PHYSICAL EXAM:  GENERAL: NAD, lying in bed comfortably  HEAD:  Atraumatic, Normocephalic  EYES: EOMI, PERRLA, conjunctiva and sclera clear  CHEST/LUNG: Clear to auscultation bilaterally; No rales, rhonchi, wheezing, or rubs. Unlabored respirations  HEART: Irregular rate and rhythm; No murmurs, rubs, or gallops  ABDOMEN: Bowel Sounds present; Soft, nontender, nondistended  EXTREMITIES:  2+ Peripheral Pulses, brisk capillary refill. No clubbing, cyanosis, or edema  NERVOUS SYSTEM:  A&Ox3, no focal deficits   SKIN: No rashes or lesions                                       ASSESSMENT & PLAN  HOUSTON KING is a 77y Female with a history significant for CVA, seizure disorder, HTN presented for evaluation of shortness of breath.    #Dypsnea due to HFrEF, afib rvr  #Acute HFrEF: new diagnosis.    -Echo showed LVEF 28%, severe reduced RV systolic function, mod to severe MR, mild to mod AR, mild TR, mod pulmonary HTN,    - holding lasix, spironolactone, losartan due to hypotension  -Continue Metoprolol   -Per Cardiology cardiomyopathy is likely from tachycardia, outpt work up  -DCCV was scheduled  but echo probe unable to be advanced. CTA left appendage obtained and there is no evidence of thrombsus. Pending DCCV  -c/w Low sodium diet, daily weights, strict I&O    New Paroxysmal Atrial Fibrillation with Rapid Ventricular Response:   -HR is not well controlled. -->RVR overnight, controlled presently  -Echo showed severe biatrial enlargement.   - c/w metoprolol  - planned for DCCV     Hypotension  - pt asymptomatic  - likely due to lasix, losartan, spironolactone  - hold antihypertensives for now  - could have element of dehydration from IVF      History of CVA  -Family Reports expressive aphasia, Not on ASA or Statin ,     Seizure disorder  -Continue Keppra 500 bid and phenytoin 100 tid      DVT Prophylaxis: Lovenox  GI Prophylaxis: None  Code Status: Full    #Progress Note Handoff:  Pending (specify):  dccv  Family discussion: with her brother and sister-in-law   Disposition: Home.                                                                                --------------------------------------------------------    # Handoff   - pending DCCV, monitor BP

## 2023-01-18 LAB
ALBUMIN SERPL ELPH-MCNC: 3.6 G/DL — SIGNIFICANT CHANGE UP (ref 3.5–5.2)
ALP SERPL-CCNC: 118 U/L — HIGH (ref 30–115)
ALT FLD-CCNC: 25 U/L — SIGNIFICANT CHANGE UP (ref 0–41)
ANION GAP SERPL CALC-SCNC: 11 MMOL/L — SIGNIFICANT CHANGE UP (ref 7–14)
AST SERPL-CCNC: 27 U/L — SIGNIFICANT CHANGE UP (ref 0–41)
BASOPHILS # BLD AUTO: 0.05 K/UL — SIGNIFICANT CHANGE UP (ref 0–0.2)
BASOPHILS NFR BLD AUTO: 0.9 % — SIGNIFICANT CHANGE UP (ref 0–1)
BILIRUB SERPL-MCNC: 0.5 MG/DL — SIGNIFICANT CHANGE UP (ref 0.2–1.2)
BUN SERPL-MCNC: 29 MG/DL — HIGH (ref 10–20)
CALCIUM SERPL-MCNC: 9.1 MG/DL — SIGNIFICANT CHANGE UP (ref 8.4–10.5)
CHLORIDE SERPL-SCNC: 103 MMOL/L — SIGNIFICANT CHANGE UP (ref 98–110)
CO2 SERPL-SCNC: 27 MMOL/L — SIGNIFICANT CHANGE UP (ref 17–32)
CREAT SERPL-MCNC: 1.3 MG/DL — SIGNIFICANT CHANGE UP (ref 0.7–1.5)
EGFR: 43 ML/MIN/1.73M2 — LOW
EOSINOPHIL # BLD AUTO: 0.15 K/UL — SIGNIFICANT CHANGE UP (ref 0–0.7)
EOSINOPHIL NFR BLD AUTO: 2.6 % — SIGNIFICANT CHANGE UP (ref 0–8)
GLUCOSE SERPL-MCNC: 99 MG/DL — SIGNIFICANT CHANGE UP (ref 70–99)
HCT VFR BLD CALC: 42.3 % — SIGNIFICANT CHANGE UP (ref 37–47)
HGB BLD-MCNC: 13.4 G/DL — SIGNIFICANT CHANGE UP (ref 12–16)
IMM GRANULOCYTES NFR BLD AUTO: 0.5 % — HIGH (ref 0.1–0.3)
LYMPHOCYTES # BLD AUTO: 1.24 K/UL — SIGNIFICANT CHANGE UP (ref 1.2–3.4)
LYMPHOCYTES # BLD AUTO: 21.3 % — SIGNIFICANT CHANGE UP (ref 20.5–51.1)
MAGNESIUM SERPL-MCNC: 2.4 MG/DL — SIGNIFICANT CHANGE UP (ref 1.8–2.4)
MCHC RBC-ENTMCNC: 28.4 PG — SIGNIFICANT CHANGE UP (ref 27–31)
MCHC RBC-ENTMCNC: 31.7 G/DL — LOW (ref 32–37)
MCV RBC AUTO: 89.6 FL — SIGNIFICANT CHANGE UP (ref 81–99)
MONOCYTES # BLD AUTO: 0.77 K/UL — HIGH (ref 0.1–0.6)
MONOCYTES NFR BLD AUTO: 13.3 % — HIGH (ref 1.7–9.3)
NEUTROPHILS # BLD AUTO: 3.57 K/UL — SIGNIFICANT CHANGE UP (ref 1.4–6.5)
NEUTROPHILS NFR BLD AUTO: 61.4 % — SIGNIFICANT CHANGE UP (ref 42.2–75.2)
NRBC # BLD: 0 /100 WBCS — SIGNIFICANT CHANGE UP (ref 0–0)
PLATELET # BLD AUTO: 225 K/UL — SIGNIFICANT CHANGE UP (ref 130–400)
POTASSIUM SERPL-MCNC: 3.7 MMOL/L — SIGNIFICANT CHANGE UP (ref 3.5–5)
POTASSIUM SERPL-SCNC: 3.7 MMOL/L — SIGNIFICANT CHANGE UP (ref 3.5–5)
PROT SERPL-MCNC: 6 G/DL — SIGNIFICANT CHANGE UP (ref 6–8)
RBC # BLD: 4.72 M/UL — SIGNIFICANT CHANGE UP (ref 4.2–5.4)
RBC # FLD: 14.9 % — HIGH (ref 11.5–14.5)
SODIUM SERPL-SCNC: 141 MMOL/L — SIGNIFICANT CHANGE UP (ref 135–146)
WBC # BLD: 5.81 K/UL — SIGNIFICANT CHANGE UP (ref 4.8–10.8)
WBC # FLD AUTO: 5.81 K/UL — SIGNIFICANT CHANGE UP (ref 4.8–10.8)

## 2023-01-18 PROCEDURE — 99232 SBSQ HOSP IP/OBS MODERATE 35: CPT

## 2023-01-18 RX ORDER — POTASSIUM CHLORIDE 20 MEQ
40 PACKET (EA) ORAL ONCE
Refills: 0 | Status: COMPLETED | OUTPATIENT
Start: 2023-01-18 | End: 2023-01-18

## 2023-01-18 RX ADMIN — Medication 40 MILLIEQUIVALENT(S): at 12:30

## 2023-01-18 RX ADMIN — LEVETIRACETAM 500 MILLIGRAM(S): 250 TABLET, FILM COATED ORAL at 17:14

## 2023-01-18 RX ADMIN — ENOXAPARIN SODIUM 90 MILLIGRAM(S): 100 INJECTION SUBCUTANEOUS at 05:16

## 2023-01-18 RX ADMIN — Medication 100 MILLIGRAM(S): at 21:37

## 2023-01-18 RX ADMIN — NYSTATIN CREAM 1 APPLICATION(S): 100000 CREAM TOPICAL at 17:14

## 2023-01-18 RX ADMIN — LEVETIRACETAM 500 MILLIGRAM(S): 250 TABLET, FILM COATED ORAL at 05:16

## 2023-01-18 RX ADMIN — Medication 100 MILLIGRAM(S): at 05:16

## 2023-01-18 RX ADMIN — NYSTATIN CREAM 1 APPLICATION(S): 100000 CREAM TOPICAL at 05:17

## 2023-01-18 RX ADMIN — CITALOPRAM 20 MILLIGRAM(S): 10 TABLET, FILM COATED ORAL at 11:31

## 2023-01-18 RX ADMIN — Medication 50 MILLIGRAM(S): at 21:37

## 2023-01-18 RX ADMIN — Medication 100 MILLIGRAM(S): at 14:47

## 2023-01-18 RX ADMIN — ENOXAPARIN SODIUM 90 MILLIGRAM(S): 100 INJECTION SUBCUTANEOUS at 17:15

## 2023-01-18 NOTE — CHART NOTE - NSCHARTNOTEFT_GEN_A_CORE
Dietitian Limited Assessment:    p/w generalized weakness over the past 4 days PTP associated with shortness of breath and lower extremity bilateral worsening edema. Acute HFrEF: new diagnosis noted. Dypsnea due to HFrEF. Hypotension - noted asymptomatic at this time.    PMH includes CVA, aphasic at baseline cannot provide detailed history, seizure prophylaxis (on phenytoin and Keppra), high blood pressure.    Diet order: DASH/TLC + Consistent Carbohydrate (evening snack). Reports pt initially had decreased appetite earlier this admission d/t generalized weakness & SOB. Reports appetite has improved over the past 5 days. Good appetite reported at this time; consuming 75% of meals at this time.    Anthropometrics:  Weight (kg): 84.8 (- @ 13:54)    Daily Weight in k (), Weight in k (), Weight in k (), Weight in k (01-15), Weight in k (), Weight in k ()    Pt has been receiving lasix throughout admission, which may explain wt changes observed this admit.    MEDICATIONS  (STANDING):  citalopram 20 milliGRAM(s) Oral daily  enoxaparin Injectable 90 milliGRAM(s) SubCutaneous every 12 hours  influenza  Vaccine (HIGH DOSE) 0.7 milliLiter(s) IntraMuscular once  levETIRAcetam 500 milliGRAM(s) Oral two times a day  metoprolol tartrate 50 milliGRAM(s) Oral three times a day  nystatin Cream 1 Application(s) Topical two times a day  phenytoin   Capsule 100 milliGRAM(s) Oral three times a day    Pertinent Labs:  @ 06:55: Na 141, BUN 29<H>, Cr 1.3, BG 99, K+ 3.7, Mg 2.4, Alk Phos 118<H>, ALT/SGPT 25, AST/SGOT 27    :  cholesterol-158 (WDL), triglycerides-71 (WDL), HDL-72 (WDL), LDL-72 (WDL), SovW3Y-6.3%    No pressure injuries noted at this time.    Pt with no nutrition risk at this time. RD to rescreen in 7 days.    Education: Heart failure nutrition therapy. Reviewed DASH/TLC nutrition therapy concepts.    Recommendation: Remove Consistent Carbohydrate diet as pt with no h/o DM + HgbA1C 5.3% and current blood glucose 99 is WDL.

## 2023-01-18 NOTE — PROGRESS NOTE ADULT - ASSESSMENT
77F PMHx CVA, seizure disorder, HTN here with shortness of breath.    A/P:   Acute HFrEF: new diagnosis.   SOB, leg edema.   CXR showed bilateral infiltrates.   Echo showed LVEF 28%, severe reduced RV systolic function, mod to severe MR, mild to mod AR, mild TR, mod pulmonary HTN,    Lasix, Aldactone and Losartan on hold due to hypotension.   Continue Metoprolol.   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   Echo showed severe biatrial enlargement.   CT heart of left atrium was negative for clot in RYANN, s/p Cardioversion 1/17, back to sinus rhythm.   Increase Metoprolol to 50mg BID, Continue Lovenox, plan for JOSE C and cardioversion once hypervolemia improve.     Hypotension   lasix, losartan, spironolactone, afib rvr  continue to hold antihypertensives for now    History of CVA  Family Reports expressive aphasia, Not on ASA or Statin ,     Seizure disorder  Continue Keppra 500 bid and phenytoin 100 tid    DVT Prophylaxis: Lovenox  GI Prophylaxis: None  Code Status: Full    #Progress Note Handoff:  Pending (specify):  Family discussion: with her brother, update about diuresis, CHF.   Disposition: Home.

## 2023-01-18 NOTE — PROGRESS NOTE ADULT - SUBJECTIVE AND OBJECTIVE BOX
HOUSTON KING  77y  Female      Patient is a 77y old  Female who presents with a chief complaint of afib (17 Jan 2023 16:53)      INTERVAL HPI/OVERNIGHT EVENTS:  She feels ok, no new complaints.   Vital Signs Last 24 Hrs  T(C): 35.8 (18 Jan 2023 15:01), Max: 36.5 (18 Jan 2023 04:34)  T(F): 96.5 (18 Jan 2023 15:01), Max: 97.7 (18 Jan 2023 04:34)  HR: 85 (18 Jan 2023 15:01) (81 - 88)  BP: 96/56 (18 Jan 2023 15:01) (96/50 - 106/57)  BP(mean): --  RR: 18 (18 Jan 2023 15:01) (18 - 18)  SpO2: 95% (18 Jan 2023 08:00) (95% - 95%)    Parameters below as of 18 Jan 2023 08:00  Patient On (Oxygen Delivery Method): room air          01-17-23 @ 07:01  -  01-18-23 @ 07:00  --------------------------------------------------------  IN: 120 mL / OUT: 500 mL / NET: -380 mL            Consultant(s) Notes Reviewed:  [x ] YES  [ ] NO          MEDICATIONS  (STANDING):  citalopram 20 milliGRAM(s) Oral daily  enoxaparin Injectable 90 milliGRAM(s) SubCutaneous every 12 hours  influenza  Vaccine (HIGH DOSE) 0.7 milliLiter(s) IntraMuscular once  levETIRAcetam 500 milliGRAM(s) Oral two times a day  metoprolol tartrate 50 milliGRAM(s) Oral three times a day  nystatin Cream 1 Application(s) Topical two times a day  phenytoin   Capsule 100 milliGRAM(s) Oral three times a day    MEDICATIONS  (PRN):      LABS                          13.4   5.81  )-----------( 225      ( 18 Jan 2023 06:55 )             42.3     01-18    141  |  103  |  29<H>  ----------------------------<  99  3.7   |  27  |  1.3    Ca    9.1      18 Jan 2023 06:55  Mg     2.4     01-18    TPro  6.0  /  Alb  3.6  /  TBili  0.5  /  DBili  x   /  AST  27  /  ALT  25  /  AlkPhos  118<H>  01-18          Lactate Trend        CAPILLARY BLOOD GLUCOSE            RADIOLOGY & ADDITIONAL TESTS:    Imaging Personally Reviewed:  [ ] YES  [ ] NO    HEALTH ISSUES - PROBLEM Dx:          PHYSICAL EXAM:  GENERAL: NAD, well-developed.  HEAD:  Atraumatic, Normocephalic.  EYES: EOMI, PERRLA, conjunctiva and sclera clear.  NECK: Supple, No JVD.  CHEST/LUNG: bibasilar rales.   HEART: Regular rate and rhythm; S1 S2.   ABDOMEN: Soft, Nontender, Nondistended; Bowel sounds present.  EXTREMITIES:  2+ Peripheral Pulses, leg edema 2+  PSYCH: awake, oriented to people, herself,.  NEUROLOGY: non-focal.  SKIN: No rashes or lesions.

## 2023-01-19 LAB
ALBUMIN SERPL ELPH-MCNC: 3.5 G/DL — SIGNIFICANT CHANGE UP (ref 3.5–5.2)
ALP SERPL-CCNC: 120 U/L — HIGH (ref 30–115)
ALT FLD-CCNC: 27 U/L — SIGNIFICANT CHANGE UP (ref 0–41)
ANION GAP SERPL CALC-SCNC: 10 MMOL/L — SIGNIFICANT CHANGE UP (ref 7–14)
AST SERPL-CCNC: 29 U/L — SIGNIFICANT CHANGE UP (ref 0–41)
BASOPHILS # BLD AUTO: 0.06 K/UL — SIGNIFICANT CHANGE UP (ref 0–0.2)
BASOPHILS NFR BLD AUTO: 1.1 % — HIGH (ref 0–1)
BILIRUB SERPL-MCNC: 0.5 MG/DL — SIGNIFICANT CHANGE UP (ref 0.2–1.2)
BUN SERPL-MCNC: 24 MG/DL — HIGH (ref 10–20)
CALCIUM SERPL-MCNC: 9 MG/DL — SIGNIFICANT CHANGE UP (ref 8.4–10.5)
CHLORIDE SERPL-SCNC: 104 MMOL/L — SIGNIFICANT CHANGE UP (ref 98–110)
CO2 SERPL-SCNC: 25 MMOL/L — SIGNIFICANT CHANGE UP (ref 17–32)
CREAT SERPL-MCNC: 1.3 MG/DL — SIGNIFICANT CHANGE UP (ref 0.7–1.5)
EGFR: 43 ML/MIN/1.73M2 — LOW
EOSINOPHIL # BLD AUTO: 0.18 K/UL — SIGNIFICANT CHANGE UP (ref 0–0.7)
EOSINOPHIL NFR BLD AUTO: 3.2 % — SIGNIFICANT CHANGE UP (ref 0–8)
GLUCOSE SERPL-MCNC: 99 MG/DL — SIGNIFICANT CHANGE UP (ref 70–99)
HCT VFR BLD CALC: 41.6 % — SIGNIFICANT CHANGE UP (ref 37–47)
HGB BLD-MCNC: 13.6 G/DL — SIGNIFICANT CHANGE UP (ref 12–16)
IMM GRANULOCYTES NFR BLD AUTO: 0.4 % — HIGH (ref 0.1–0.3)
LYMPHOCYTES # BLD AUTO: 1.39 K/UL — SIGNIFICANT CHANGE UP (ref 1.2–3.4)
LYMPHOCYTES # BLD AUTO: 25 % — SIGNIFICANT CHANGE UP (ref 20.5–51.1)
MAGNESIUM SERPL-MCNC: 2.3 MG/DL — SIGNIFICANT CHANGE UP (ref 1.8–2.4)
MCHC RBC-ENTMCNC: 28.7 PG — SIGNIFICANT CHANGE UP (ref 27–31)
MCHC RBC-ENTMCNC: 32.7 G/DL — SIGNIFICANT CHANGE UP (ref 32–37)
MCV RBC AUTO: 87.8 FL — SIGNIFICANT CHANGE UP (ref 81–99)
MONOCYTES # BLD AUTO: 0.64 K/UL — HIGH (ref 0.1–0.6)
MONOCYTES NFR BLD AUTO: 11.5 % — HIGH (ref 1.7–9.3)
NEUTROPHILS # BLD AUTO: 3.26 K/UL — SIGNIFICANT CHANGE UP (ref 1.4–6.5)
NEUTROPHILS NFR BLD AUTO: 58.8 % — SIGNIFICANT CHANGE UP (ref 42.2–75.2)
NRBC # BLD: 0 /100 WBCS — SIGNIFICANT CHANGE UP (ref 0–0)
PLATELET # BLD AUTO: 215 K/UL — SIGNIFICANT CHANGE UP (ref 130–400)
POTASSIUM SERPL-MCNC: 4.1 MMOL/L — SIGNIFICANT CHANGE UP (ref 3.5–5)
POTASSIUM SERPL-SCNC: 4.1 MMOL/L — SIGNIFICANT CHANGE UP (ref 3.5–5)
PROT SERPL-MCNC: 5.9 G/DL — LOW (ref 6–8)
RBC # BLD: 4.74 M/UL — SIGNIFICANT CHANGE UP (ref 4.2–5.4)
RBC # FLD: 14.9 % — HIGH (ref 11.5–14.5)
SODIUM SERPL-SCNC: 139 MMOL/L — SIGNIFICANT CHANGE UP (ref 135–146)
WBC # BLD: 5.55 K/UL — SIGNIFICANT CHANGE UP (ref 4.8–10.8)
WBC # FLD AUTO: 5.55 K/UL — SIGNIFICANT CHANGE UP (ref 4.8–10.8)

## 2023-01-19 PROCEDURE — 99233 SBSQ HOSP IP/OBS HIGH 50: CPT

## 2023-01-19 RX ADMIN — ENOXAPARIN SODIUM 90 MILLIGRAM(S): 100 INJECTION SUBCUTANEOUS at 17:34

## 2023-01-19 RX ADMIN — NYSTATIN CREAM 1 APPLICATION(S): 100000 CREAM TOPICAL at 05:22

## 2023-01-19 RX ADMIN — Medication 100 MILLIGRAM(S): at 21:09

## 2023-01-19 RX ADMIN — LEVETIRACETAM 500 MILLIGRAM(S): 250 TABLET, FILM COATED ORAL at 17:34

## 2023-01-19 RX ADMIN — NYSTATIN CREAM 1 APPLICATION(S): 100000 CREAM TOPICAL at 17:37

## 2023-01-19 RX ADMIN — Medication 100 MILLIGRAM(S): at 05:22

## 2023-01-19 RX ADMIN — CITALOPRAM 20 MILLIGRAM(S): 10 TABLET, FILM COATED ORAL at 11:40

## 2023-01-19 RX ADMIN — Medication 100 MILLIGRAM(S): at 13:37

## 2023-01-19 RX ADMIN — LEVETIRACETAM 500 MILLIGRAM(S): 250 TABLET, FILM COATED ORAL at 05:22

## 2023-01-19 RX ADMIN — ENOXAPARIN SODIUM 90 MILLIGRAM(S): 100 INJECTION SUBCUTANEOUS at 05:22

## 2023-01-19 NOTE — PHYSICAL THERAPY INITIAL EVALUATION ADULT - ORIENTATION, REHAB EVAL
pt recognized family at the b/s, but unable to stated their names, family stated when pt is stressed her aphasia becomes more prominent/person/place

## 2023-01-19 NOTE — PROGRESS NOTE ADULT - SUBJECTIVE AND OBJECTIVE BOX
HOUSTON KING 77y Female  MRN#: [id number]   Hospital Day: 13d    HPI:  77-year-old female past medical history of CVA, aphasic at baseline cannot provide detailed history, seizure prophylaxis (on phenytoin and Keppra), high blood pressure presents with generalized weakness over the past 4 days associated with shortness of breath and lower extremity bilateral worsening edema.  Per family at bedside on Tuesday approximately 4 days ago they went to check up on the patient as she was having nonbloody diarrhea and noticed she was short of breath spoke to the primary care doctor that they followed up with today and had an EKG done that showed new onset atrial fibrillation and was sent to the emergency department.     ED vitals:  /73, , Temp 98, satting 99% on 3L NC  Trop neg, BNP 5671  EKG Afib w/ RVR  CXR opacities, effusion    s/p Rocephin, Azithromycin, Lasix 40mg IV x1, Cardizem 20mg IV x1 in the ED.  Admitted for new onset fib. (06 Jan 2023 21:00)      SUBJECTIVE  Patient is a 77y old Female who presents with a chief complaint of afib (18 Jan 2023 16:55)  Currently admitted to medicine with the primary diagnosis of Pneumonia        INTERVAL HPI AND OVERNIGHT EVENTS:  Patient was examined and seen at bedside. This morning she is resting comfortably in bed and reports no issues or overnight events.    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)      ALLERGIES:  No Known Allergies    MEDICATIONS:  STANDING MEDICATIONS  citalopram 20 milliGRAM(s) Oral daily  enoxaparin Injectable 90 milliGRAM(s) SubCutaneous every 12 hours  influenza  Vaccine (HIGH DOSE) 0.7 milliLiter(s) IntraMuscular once  levETIRAcetam 500 milliGRAM(s) Oral two times a day  metoprolol tartrate 50 milliGRAM(s) Oral three times a day  nystatin Cream 1 Application(s) Topical two times a day  phenytoin   Capsule 100 milliGRAM(s) Oral three times a day    PRN MEDICATIONS      VITAL SIGNS: Last 24 Hours  T(C): 36.4 (19 Jan 2023 04:43), Max: 36.6 (18 Jan 2023 21:09)  T(F): 97.6 (19 Jan 2023 04:43), Max: 97.9 (18 Jan 2023 21:09)  HR: 79 (19 Jan 2023 04:43) (79 - 87)  BP: 95/57 (19 Jan 2023 04:43) (95/57 - 111/56)  BP(mean): --  RR: 18 (19 Jan 2023 04:43) (18 - 18)  SpO2: --    LABS:                        13.6   5.55  )-----------( 215      ( 19 Jan 2023 05:42 )             41.6     01-19    139  |  104  |  24<H>  ----------------------------<  99  4.1   |  25  |  1.3    Ca    9.0      19 Jan 2023 05:42  Mg     2.3     01-19    TPro  5.9<L>  /  Alb  3.5  /  TBili  0.5  /  DBili  x   /  AST  29  /  ALT  27  /  AlkPhos  120<H>  01-19                  RADIOLOGY:      PHYSICAL EXAM:  GENERAL: NAD, well-developed.  HEAD:  Atraumatic, Normocephalic.  EYES: EOMI, PERRLA, conjunctiva and sclera clear.  NECK: Supple, No JVD.  CHEST/LUNG: bibasilar rales.   HEART: Regular rate and rhythm; S1 S2.   ABDOMEN: Soft, Nontender, Nondistended; Bowel sounds present.  EXTREMITIES:  2+ Peripheral Pulses, leg edema 2+  PSYCH: awake, oriented to people, herself,.  NEUROLOGY: non-focal.  SKIN: No rashes or lesions.       ASSESSMENT & PLAN  77F PMHx CVA, seizure disorder, HTN here with shortness of breath.    #Acute HFrEF: new diagnosis.   *SOB, leg edema.   - CXR (01/09)showed bilateral infiltrates.   - Echo (01/08): showed LVEF 28%, severe reduced RV systolic function, mod to severe MR, mild to mod AR, mild TR, mod pulmonary HTN,    - Lasix, Aldactone and Losartan on hold due to hypotension.   - Continue Metoprolol.   - Per Cardiology cardiomyopathy is likely from tachycardia     #New Paroxysmal Atrial Fibrillation with Rapid Ventricular Response:   - HR is controlled today.   - Echo showed severe biatrial enlargement.   - CT heart of left atrium was negative for clot in RYANN, s/p Cardioversion 1/17, back to sinus rhythm.   - Increase Metoprolol to 50mg BID, Continue Lovenox, plan for JOSEC  and cardioversion once hypervolemia improve.     #Hypotension  continue to hold antihypertensives for now    #History of CVA  Family Reports expressive aphasia, Not on ASA or Statin ,     #Seizure disorder  Continue Keppra 500 bid and phenytoin 100 tid    DVT Prophylaxis: Lovenox  GI Prophylaxis: None  Code Status: Full    #Progress Note Handoff:  Pending (specify): update about diuresis, CHF.

## 2023-01-19 NOTE — PROGRESS NOTE ADULT - SUBJECTIVE AND OBJECTIVE BOX
HOUSTON KING  77y  Female      Patient is a 77y old  Female who presents with a chief complaint of afib (19 Jan 2023 09:41)      INTERVAL HPI/OVERNIGHT EVENTS:  She feels ok, no new complaints.   Vital Signs Last 24 Hrs  T(C): 36.6 (19 Jan 2023 13:13), Max: 36.6 (18 Jan 2023 21:09)  T(F): 97.9 (19 Jan 2023 13:13), Max: 97.9 (18 Jan 2023 21:09)  HR: 72 (19 Jan 2023 13:13) (72 - 87)  BP: 100/46 (19 Jan 2023 13:13) (84/54 - 111/56)  BP(mean): --  RR: 18 (19 Jan 2023 13:13) (18 - 18)  SpO2: 95% (19 Jan 2023 13:13) (95% - 95%)    Parameters below as of 19 Jan 2023 13:13  Patient On (Oxygen Delivery Method): room air          01-18-23 @ 07:01 - 01-19-23 @ 07:00  --------------------------------------------------------  IN: 0 mL / OUT: 500 mL / NET: -500 mL    01-19-23 @ 07:01 - 01-19-23 @ 16:49  --------------------------------------------------------  IN: 486 mL / OUT: 650 mL / NET: -164 mL            Consultant(s) Notes Reviewed:  [x ] YES  [ ] NO          MEDICATIONS  (STANDING):  citalopram 20 milliGRAM(s) Oral daily  enoxaparin Injectable 90 milliGRAM(s) SubCutaneous every 12 hours  influenza  Vaccine (HIGH DOSE) 0.7 milliLiter(s) IntraMuscular once  levETIRAcetam 500 milliGRAM(s) Oral two times a day  metoprolol tartrate 50 milliGRAM(s) Oral three times a day  nystatin Cream 1 Application(s) Topical two times a day  phenytoin   Capsule 100 milliGRAM(s) Oral three times a day    MEDICATIONS  (PRN):      LABS                          13.6   5.55  )-----------( 215      ( 19 Jan 2023 05:42 )             41.6     01-19    139  |  104  |  24<H>  ----------------------------<  99  4.1   |  25  |  1.3    Ca    9.0      19 Jan 2023 05:42  Mg     2.3     01-19    TPro  5.9<L>  /  Alb  3.5  /  TBili  0.5  /  DBili  x   /  AST  29  /  ALT  27  /  AlkPhos  120<H>  01-19          Lactate Trend        CAPILLARY BLOOD GLUCOSE            RADIOLOGY & ADDITIONAL TESTS:    Imaging Personally Reviewed:  [ ] YES  [ ] NO    HEALTH ISSUES - PROBLEM Dx:          PHYSICAL EXAM:  GENERAL: NAD, well-developed.  HEAD:  Atraumatic, Normocephalic.  EYES: EOMI, PERRLA, conjunctiva and sclera clear.  NECK: Supple, No JVD.  CHEST/LUNG: bibasilar rales.   HEART: Regular rate and rhythm; S1 S2.   ABDOMEN: Soft, Nontender, Nondistended; Bowel sounds present.  EXTREMITIES:  2+ Peripheral Pulses, leg edema 2+  PSYCH: awake, oriented to people, herself,.  NEUROLOGY: non-focal.  SKIN: No rashes or lesions.

## 2023-01-19 NOTE — PHYSICAL THERAPY INITIAL EVALUATION ADULT - ADDITIONAL COMMENTS
2+1 ANNA, no steps inside  PT spoke with brother and sister in law extensively about d/c plan, they would like pt to go to rehab and then possibly assisted living, as per sister in law, pt cannot go back to live with her sister

## 2023-01-19 NOTE — PHYSICAL THERAPY INITIAL EVALUATION ADULT - GENERAL OBSERVATIONS, REHAB EVAL
10:26-11:19 53 min  pt rec in bed in NAD, +primafit intact, brother and sister in law at the b/s, pt agreeable to PT

## 2023-01-20 LAB
ALBUMIN SERPL ELPH-MCNC: 3.1 G/DL — LOW (ref 3.5–5.2)
ALP SERPL-CCNC: 108 U/L — SIGNIFICANT CHANGE UP (ref 30–115)
ALT FLD-CCNC: 25 U/L — SIGNIFICANT CHANGE UP (ref 0–41)
ANION GAP SERPL CALC-SCNC: 8 MMOL/L — SIGNIFICANT CHANGE UP (ref 7–14)
AST SERPL-CCNC: 27 U/L — SIGNIFICANT CHANGE UP (ref 0–41)
BASOPHILS # BLD AUTO: 0.04 K/UL — SIGNIFICANT CHANGE UP (ref 0–0.2)
BASOPHILS NFR BLD AUTO: 0.9 % — SIGNIFICANT CHANGE UP (ref 0–1)
BILIRUB SERPL-MCNC: 0.4 MG/DL — SIGNIFICANT CHANGE UP (ref 0.2–1.2)
BUN SERPL-MCNC: 21 MG/DL — HIGH (ref 10–20)
CALCIUM SERPL-MCNC: 8.4 MG/DL — SIGNIFICANT CHANGE UP (ref 8.4–10.4)
CHLORIDE SERPL-SCNC: 102 MMOL/L — SIGNIFICANT CHANGE UP (ref 98–110)
CO2 SERPL-SCNC: 25 MMOL/L — SIGNIFICANT CHANGE UP (ref 17–32)
CREAT SERPL-MCNC: 1 MG/DL — SIGNIFICANT CHANGE UP (ref 0.7–1.5)
EGFR: 59 ML/MIN/1.73M2 — LOW
EOSINOPHIL # BLD AUTO: 0.18 K/UL — SIGNIFICANT CHANGE UP (ref 0–0.7)
EOSINOPHIL NFR BLD AUTO: 4 % — SIGNIFICANT CHANGE UP (ref 0–8)
GLUCOSE SERPL-MCNC: 91 MG/DL — SIGNIFICANT CHANGE UP (ref 70–99)
HCT VFR BLD CALC: 39.4 % — SIGNIFICANT CHANGE UP (ref 37–47)
HGB BLD-MCNC: 12.9 G/DL — SIGNIFICANT CHANGE UP (ref 12–16)
IMM GRANULOCYTES NFR BLD AUTO: 0.7 % — HIGH (ref 0.1–0.3)
LYMPHOCYTES # BLD AUTO: 1.28 K/UL — SIGNIFICANT CHANGE UP (ref 1.2–3.4)
LYMPHOCYTES # BLD AUTO: 28.6 % — SIGNIFICANT CHANGE UP (ref 20.5–51.1)
MAGNESIUM SERPL-MCNC: 2.1 MG/DL — SIGNIFICANT CHANGE UP (ref 1.8–2.4)
MCHC RBC-ENTMCNC: 28.5 PG — SIGNIFICANT CHANGE UP (ref 27–31)
MCHC RBC-ENTMCNC: 32.7 G/DL — SIGNIFICANT CHANGE UP (ref 32–37)
MCV RBC AUTO: 87.2 FL — SIGNIFICANT CHANGE UP (ref 81–99)
MONOCYTES # BLD AUTO: 0.55 K/UL — SIGNIFICANT CHANGE UP (ref 0.1–0.6)
MONOCYTES NFR BLD AUTO: 12.3 % — HIGH (ref 1.7–9.3)
NEUTROPHILS # BLD AUTO: 2.4 K/UL — SIGNIFICANT CHANGE UP (ref 1.4–6.5)
NEUTROPHILS NFR BLD AUTO: 53.5 % — SIGNIFICANT CHANGE UP (ref 42.2–75.2)
NRBC # BLD: 0 /100 WBCS — SIGNIFICANT CHANGE UP (ref 0–0)
PLATELET # BLD AUTO: 187 K/UL — SIGNIFICANT CHANGE UP (ref 130–400)
POTASSIUM SERPL-MCNC: 3.9 MMOL/L — SIGNIFICANT CHANGE UP (ref 3.5–5)
POTASSIUM SERPL-SCNC: 3.9 MMOL/L — SIGNIFICANT CHANGE UP (ref 3.5–5)
PROT SERPL-MCNC: 5.6 G/DL — LOW (ref 6–8)
RBC # BLD: 4.52 M/UL — SIGNIFICANT CHANGE UP (ref 4.2–5.4)
RBC # FLD: 15.1 % — HIGH (ref 11.5–14.5)
SARS-COV-2 RNA SPEC QL NAA+PROBE: SIGNIFICANT CHANGE UP
SODIUM SERPL-SCNC: 135 MMOL/L — SIGNIFICANT CHANGE UP (ref 135–146)
WBC # BLD: 4.48 K/UL — LOW (ref 4.8–10.8)
WBC # FLD AUTO: 4.48 K/UL — LOW (ref 4.8–10.8)

## 2023-01-20 PROCEDURE — 99232 SBSQ HOSP IP/OBS MODERATE 35: CPT

## 2023-01-20 RX ORDER — METOPROLOL TARTRATE 50 MG
1 TABLET ORAL
Qty: 0 | Refills: 0 | DISCHARGE
Start: 2023-01-20

## 2023-01-20 RX ORDER — HYDRALAZINE HCL 50 MG
1 TABLET ORAL
Qty: 0 | Refills: 0 | DISCHARGE

## 2023-01-20 RX ORDER — METOPROLOL TARTRATE 50 MG
50 TABLET ORAL
Refills: 0 | Status: DISCONTINUED | OUTPATIENT
Start: 2023-01-20 | End: 2023-01-26

## 2023-01-20 RX ORDER — APIXABAN 2.5 MG/1
1 TABLET, FILM COATED ORAL
Qty: 0 | Refills: 0 | DISCHARGE
Start: 2023-01-20

## 2023-01-20 RX ORDER — CANDESARTAN CILEXETIL AND HYDROCHLOROTHIAZIDE 32; 12.5 MG/1; MG/1
1 TABLET ORAL
Qty: 0 | Refills: 0 | DISCHARGE

## 2023-01-20 RX ORDER — NYSTATIN CREAM 100000 [USP'U]/G
1 CREAM TOPICAL
Qty: 0 | Refills: 0 | DISCHARGE
Start: 2023-01-20

## 2023-01-20 RX ORDER — METOPROLOL TARTRATE 50 MG
50 TABLET ORAL THREE TIMES A DAY
Refills: 0 | Status: DISCONTINUED | OUTPATIENT
Start: 2023-01-20 | End: 2023-01-20

## 2023-01-20 RX ORDER — APIXABAN 2.5 MG/1
5 TABLET, FILM COATED ORAL EVERY 12 HOURS
Refills: 0 | Status: DISCONTINUED | OUTPATIENT
Start: 2023-01-20 | End: 2023-01-26

## 2023-01-20 RX ORDER — LABETALOL HCL 100 MG
1 TABLET ORAL
Qty: 0 | Refills: 0 | DISCHARGE

## 2023-01-20 RX ADMIN — Medication 100 MILLIGRAM(S): at 05:50

## 2023-01-20 RX ADMIN — Medication 100 MILLIGRAM(S): at 13:05

## 2023-01-20 RX ADMIN — NYSTATIN CREAM 1 APPLICATION(S): 100000 CREAM TOPICAL at 17:31

## 2023-01-20 RX ADMIN — CITALOPRAM 20 MILLIGRAM(S): 10 TABLET, FILM COATED ORAL at 12:01

## 2023-01-20 RX ADMIN — ENOXAPARIN SODIUM 90 MILLIGRAM(S): 100 INJECTION SUBCUTANEOUS at 05:51

## 2023-01-20 RX ADMIN — Medication 50 MILLIGRAM(S): at 18:17

## 2023-01-20 RX ADMIN — Medication 100 MILLIGRAM(S): at 22:28

## 2023-01-20 RX ADMIN — LEVETIRACETAM 500 MILLIGRAM(S): 250 TABLET, FILM COATED ORAL at 17:29

## 2023-01-20 RX ADMIN — NYSTATIN CREAM 1 APPLICATION(S): 100000 CREAM TOPICAL at 06:05

## 2023-01-20 RX ADMIN — LEVETIRACETAM 500 MILLIGRAM(S): 250 TABLET, FILM COATED ORAL at 05:50

## 2023-01-20 RX ADMIN — APIXABAN 5 MILLIGRAM(S): 2.5 TABLET, FILM COATED ORAL at 17:29

## 2023-01-20 NOTE — PROGRESS NOTE ADULT - ASSESSMENT
77F PMHx CVA, seizure disorder, HTN here with shortness of breath.    A/P:   Acute HFrEF: new diagnosis.   SOB, leg edema.   CXR showed bilateral infiltrates.   Echo showed LVEF 28%, severe reduced RV systolic function, mod to severe MR, mild to mod AR, mild TR, mod pulmonary HTN,    Lasix, Aldactone and Losartan on hold due to hypotension.   Continue Metoprolol.   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   Echo showed severe biatrial enlargement.   CT heart of left atrium was negative for clot in RYANN, s/p Cardioversion 1/17, back to sinus rhythm.   Increase Metoprolol to 50mg BID, Continue Eliquis.     Hypotension  Lasix, losartan, spironolactone on hold  continue to hold antihypertensives for now    History of CVA  Family Reports expressive aphasia, Not on ASA or Statin ,     Seizure disorder  Continue Keppra 500 bid and phenytoin 100 tid    DVT Prophylaxis: Lovenox  GI Prophylaxis: None  Code Status: Full    #Progress Note Handoff:  Pending (specify): placement  Family discussion: with her brother, update about diuresis, CHF.   Disposition: STR

## 2023-01-20 NOTE — DISCHARGE NOTE PROVIDER - HOSPITAL COURSE
77F PMHx CVA, seizure disorder, HTN here with shortness of breath. She was managed as listed below     #Acute HFrEF: new diagnosis.   *SOB, leg edema.   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   - Echo (01/08): showed LVEF 28%, severe reduced RV systolic function, mod to severe MR, mild to mod AR, mild TR, mod pulmonary HTN,    - Lasix, Aldactone and Losartan on hold due to hypotension.   - Continue Metoprolol.       #New Paroxysmal Atrial Fibrillation with Rapid Ventricular Response:   - HR is controlled today.   - Echo showed severe biatrial enlargement.   - CT heart of left atrium was negative for clot in RYANN, s/p Cardioversion 1/17, back to sinus rhythm.   - Increase Metoprolol to 50mg BID,  will start eliquis 5 BID    #Hypotension  continue to hold antihypertensives for now    #History of CVA  Family Reports expressive aphasia, Not on ASA or Statin ,     #Seizure disorder  Continue Keppra 500 bid and phenytoin 100 tid     77F PMHx CVA, seizure disorder, HTN here with shortness of breath. She was managed as listed below     #Acute HFrEF: new diagnosis.   *SOB, leg edema.   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   - Echo (01/08): showed LVEF 28%, severe reduced RV systolic function, mod to severe MR, mild to mod AR, mild TR, mod pulmonary HTN,    - Lasix, Aldactone and Losartan on hold due to hypotension.   - Continue Metoprolol.   - Hypotenison resolved and Entresto low dose started  -  #New Paroxysmal Atrial Fibrillation with Rapid Ventricular Response:   - HR is controlled today.   - Echo showed severe biatrial enlargement.   - CT heart of left atrium was negative for clot in RYANN, s/p Cardioversion 1/17, back to sinus rhythm.   - Increase Metoprolol to 50mg BID,    - Eliquis 5 BID    #History of CVA  Family Reports expressive aphasia,  - Aspirin 81mg qd  - atorvastatin 40mg qd    #Seizure disorder  Continue Keppra 500 bid and phenytoin 100 tid

## 2023-01-20 NOTE — PROGRESS NOTE ADULT - SUBJECTIVE AND OBJECTIVE BOX
HOUSTON KING  77y  Female      Patient is a 77y old  Female who presents with a chief complaint of afib (20 Jan 2023 10:00)      INTERVAL HPI/OVERNIGHT EVENTS:  She feels ok, no new complaints.   Vital Signs Last 24 Hrs  T(C): 36.1 (20 Jan 2023 14:03), Max: 36.4 (19 Jan 2023 19:40)  T(F): 97 (20 Jan 2023 14:03), Max: 97.6 (19 Jan 2023 19:40)  HR: 81 (20 Jan 2023 14:03) (81 - 90)  BP: 95/54 (20 Jan 2023 14:03) (95/54 - 109/54)  BP(mean): --  RR: 18 (20 Jan 2023 14:03) (18 - 18)  SpO2: --          01-19-23 @ 07:01  -  01-20-23 @ 07:00  --------------------------------------------------------  IN: 486 mL / OUT: 650 mL / NET: -164 mL    01-20-23 @ 07:01  -  01-20-23 @ 17:02  --------------------------------------------------------  IN: 0 mL / OUT: 400 mL / NET: -400 mL            Consultant(s) Notes Reviewed:  [x ] YES  [ ] NO          MEDICATIONS  (STANDING):  apixaban 5 milliGRAM(s) Oral every 12 hours  citalopram 20 milliGRAM(s) Oral daily  influenza  Vaccine (HIGH DOSE) 0.7 milliLiter(s) IntraMuscular once  levETIRAcetam 500 milliGRAM(s) Oral two times a day  metoprolol tartrate 50 milliGRAM(s) Oral two times a day  nystatin Cream 1 Application(s) Topical two times a day  phenytoin   Capsule 100 milliGRAM(s) Oral three times a day    MEDICATIONS  (PRN):      LABS                          12.9   4.48  )-----------( 187      ( 20 Jan 2023 08:06 )             39.4     01-20    135  |  102  |  21<H>  ----------------------------<  91  3.9   |  25  |  1.0    Ca    8.4      20 Jan 2023 08:06  Mg     2.1     01-20    TPro  5.6<L>  /  Alb  3.1<L>  /  TBili  0.4  /  DBili  x   /  AST  27  /  ALT  25  /  AlkPhos  108  01-20          Lactate Trend        CAPILLARY BLOOD GLUCOSE            RADIOLOGY & ADDITIONAL TESTS:    Imaging Personally Reviewed:  [ ] YES  [ ] NO    HEALTH ISSUES - PROBLEM Dx:          PHYSICAL EXAM:  GENERAL: NAD, well-developed.  HEAD:  Atraumatic, Normocephalic.  EYES: EOMI, PERRLA, conjunctiva and sclera clear.  NECK: Supple, No JVD.  CHEST/LUNG: bibasilar rales.   HEART: Regular rate and rhythm; S1 S2.   ABDOMEN: Soft, Nontender, Nondistended; Bowel sounds present.  EXTREMITIES:  2+ Peripheral Pulses, leg edema 2+  PSYCH: awake, oriented to people, herself,.  NEUROLOGY: non-focal.  SKIN: No rashes or lesions.

## 2023-01-20 NOTE — DISCHARGE NOTE PROVIDER - CARE PROVIDER_API CALL
Roshan Condon (DO)  Cardiovascular Disease; Internal Medicine  [address] [place] [id number]  Phone: [phone]  Fax: [phone]  Follow Up Time: 2 weeks

## 2023-01-20 NOTE — DISCHARGE NOTE PROVIDER - NSDCCPCAREPLAN_GEN_ALL_CORE_FT
PRINCIPAL DISCHARGE DIAGNOSIS  Diagnosis: New onset atrial fibrillation  Assessment and Plan of Treatment: Atrial fibrillation is a type of irregular heartbeat (arrhythmia) where the heart quivers continuously in a chaotic pattern that makes the heart unable to pump blood normally. This can increase the risk for stroke, heart failure, and other heart-related conditions. Atrial fibrillation can be caused by a variety of conditions and may be temporary, intermittent, or permanent. Symptoms include feeling that your heart is beating rapidly or irregularly, chest discomfort, shortness of breath, or dizziness/lightheadedness that may be worse with exertion. you recieved electrical shock (cardioversion) and will be on blood thinners.  SEEK IMMEDIATE MEDICAL CARE IF YOU HAVE ANY OF THE FOLLOWING SYMPTOMS: chest pain, shortness of breath, abdominal pain, sweating, vomiting, blood in vomit/bowel movements/urine, dizziness/lightheadedness, weakness or numbness to face/arm/leg, trouble speaking or understanding, facial droop.        SECONDARY DISCHARGE DIAGNOSES  Diagnosis: New onset atrial fibrillation  Assessment and Plan of Treatment:     Diagnosis: Prolonged QT interval  Assessment and Plan of Treatment:

## 2023-01-20 NOTE — DISCHARGE NOTE PROVIDER - NSDCMRMEDTOKEN_GEN_ALL_CORE_FT
citalopram 20 mg oral tablet: 1 tab(s) orally once a day  Keppra 500 mg oral tablet: 1 tab(s) orally 2 times a day  metoprolol tartrate 50 mg oral tablet: 1 tab(s) orally 3 times a day  nystatin 100,000 units/g topical cream: 1 application topically 2 times a day  phenytoin 100 mg oral capsule: 1 tab(s) orally 3 times a day   apixaban 5 mg oral tablet: 1 tab(s) orally every 12 hours  aspirin 81 mg oral delayed release tablet: 1 tab(s) orally once a day  atorvastatin 40 mg oral tablet: 1 tab(s) orally once a day (at bedtime)  citalopram 20 mg oral tablet: 1 tab(s) orally once a day  Keppra 500 mg oral tablet: 1 tab(s) orally 2 times a day  metoprolol tartrate 50 mg oral tablet: 1 tab(s) orally 2 times a day  nystatin 100,000 units/g topical cream: 1 application topically 2 times a day  phenytoin 100 mg oral capsule: 1 tab(s) orally 3 times a day  sacubitril-valsartan 24 mg-26 mg oral tablet: 1 tab(s) orally 2 times a day

## 2023-01-21 LAB
HCT VFR BLD CALC: 40.7 % — SIGNIFICANT CHANGE UP (ref 37–47)
HGB BLD-MCNC: 13.3 G/DL — SIGNIFICANT CHANGE UP (ref 12–16)
MCHC RBC-ENTMCNC: 28.5 PG — SIGNIFICANT CHANGE UP (ref 27–31)
MCHC RBC-ENTMCNC: 32.7 G/DL — SIGNIFICANT CHANGE UP (ref 32–37)
MCV RBC AUTO: 87.2 FL — SIGNIFICANT CHANGE UP (ref 81–99)
NRBC # BLD: 0 /100 WBCS — SIGNIFICANT CHANGE UP (ref 0–0)
PLATELET # BLD AUTO: 190 K/UL — SIGNIFICANT CHANGE UP (ref 130–400)
RBC # BLD: 4.67 M/UL — SIGNIFICANT CHANGE UP (ref 4.2–5.4)
RBC # FLD: 15.1 % — HIGH (ref 11.5–14.5)
WBC # BLD: 4.33 K/UL — LOW (ref 4.8–10.8)
WBC # FLD AUTO: 4.33 K/UL — LOW (ref 4.8–10.8)

## 2023-01-21 PROCEDURE — 99232 SBSQ HOSP IP/OBS MODERATE 35: CPT

## 2023-01-21 RX ADMIN — LEVETIRACETAM 500 MILLIGRAM(S): 250 TABLET, FILM COATED ORAL at 17:18

## 2023-01-21 RX ADMIN — NYSTATIN CREAM 1 APPLICATION(S): 100000 CREAM TOPICAL at 06:29

## 2023-01-21 RX ADMIN — Medication 100 MILLIGRAM(S): at 21:38

## 2023-01-21 RX ADMIN — APIXABAN 5 MILLIGRAM(S): 2.5 TABLET, FILM COATED ORAL at 17:18

## 2023-01-21 RX ADMIN — CITALOPRAM 20 MILLIGRAM(S): 10 TABLET, FILM COATED ORAL at 11:15

## 2023-01-21 RX ADMIN — LEVETIRACETAM 500 MILLIGRAM(S): 250 TABLET, FILM COATED ORAL at 06:29

## 2023-01-21 RX ADMIN — Medication 100 MILLIGRAM(S): at 06:29

## 2023-01-21 RX ADMIN — APIXABAN 5 MILLIGRAM(S): 2.5 TABLET, FILM COATED ORAL at 06:29

## 2023-01-21 RX ADMIN — Medication 50 MILLIGRAM(S): at 17:18

## 2023-01-21 RX ADMIN — NYSTATIN CREAM 1 APPLICATION(S): 100000 CREAM TOPICAL at 17:18

## 2023-01-21 RX ADMIN — Medication 100 MILLIGRAM(S): at 13:35

## 2023-01-21 RX ADMIN — Medication 50 MILLIGRAM(S): at 06:29

## 2023-01-21 NOTE — PROGRESS NOTE ADULT - SUBJECTIVE AND OBJECTIVE BOX
HOUSTON KING  77y  Female      Patient is a 77y old  Female who presents with a chief complaint of afib (20 Jan 2023 17:00)      INTERVAL HPI/OVERNIGHT EVENTS:  She feels ok, no new complaints.   Vital Signs Last 24 Hrs  T(C): 35.9 (21 Jan 2023 04:59), Max: 36.1 (20 Jan 2023 14:03)  T(F): 96.7 (21 Jan 2023 04:59), Max: 97 (20 Jan 2023 14:03)  HR: 88 (21 Jan 2023 04:59) (81 - 91)  BP: 113/73 (21 Jan 2023 04:59) (95/54 - 120/61)  BP(mean): --  RR: 18 (21 Jan 2023 04:59) (18 - 18)  SpO2: --          01-20-23 @ 07:01  -  01-21-23 @ 07:00  --------------------------------------------------------  IN: 600 mL / OUT: 400 mL / NET: 200 mL    01-21-23 @ 07:01  -  01-21-23 @ 13:58  --------------------------------------------------------  IN: 120 mL / OUT: 0 mL / NET: 120 mL            Consultant(s) Notes Reviewed:  [x ] YES  [ ] NO          MEDICATIONS  (STANDING):  apixaban 5 milliGRAM(s) Oral every 12 hours  citalopram 20 milliGRAM(s) Oral daily  influenza  Vaccine (HIGH DOSE) 0.7 milliLiter(s) IntraMuscular once  levETIRAcetam 500 milliGRAM(s) Oral two times a day  metoprolol tartrate 50 milliGRAM(s) Oral two times a day  nystatin Cream 1 Application(s) Topical two times a day  phenytoin   Capsule 100 milliGRAM(s) Oral three times a day    MEDICATIONS  (PRN):      LABS                          13.3   4.33  )-----------( 190      ( 21 Jan 2023 06:25 )             40.7     01-20    135  |  102  |  21<H>  ----------------------------<  91  3.9   |  25  |  1.0    Ca    8.4      20 Jan 2023 08:06  Mg     2.1     01-20    TPro  5.6<L>  /  Alb  3.1<L>  /  TBili  0.4  /  DBili  x   /  AST  27  /  ALT  25  /  AlkPhos  108  01-20          Lactate Trend        CAPILLARY BLOOD GLUCOSE            RADIOLOGY & ADDITIONAL TESTS:    Imaging Personally Reviewed:  [ ] YES  [ ] NO    HEALTH ISSUES - PROBLEM Dx:          PHYSICAL EXAM:  GENERAL: NAD, well-developed.  HEAD:  Atraumatic, Normocephalic.  EYES: EOMI, PERRLA, conjunctiva and sclera clear.  NECK: Supple, No JVD.  CHEST/LUNG: bibasilar rales.   HEART: Regular rate and rhythm; S1 S2.   ABDOMEN: Soft, Nontender, Nondistended; Bowel sounds present.  EXTREMITIES:  2+ Peripheral Pulses, leg edema 2+  PSYCH: awake, oriented to people, herself,.  NEUROLOGY: non-focal.  SKIN: No rashes or lesions.

## 2023-01-21 NOTE — PROGRESS NOTE ADULT - SUBJECTIVE AND OBJECTIVE BOX
HOUSTON KING 77y Female  MRN#: [id number]     SUBJECTIVE  Patient was examined and seen at bedside. This morning they are resting comfortably in bed and report no issues or overnight events.                                          OBJECTIVE     ALLERGIES:  No Known Allergies      PHYSICAL EXAM:  GENERAL: NAD, lying in bed comfortably  HEAD:  Atraumatic, Normocephalic  EYES: EOMI, PERRLA, conjunctiva and sclera clear  ENT: Moist mucous membranes  NECK: Supple, No JVD  CHEST/LUNG: Clear to auscultation bilaterally; No rales, rhonchi, wheezing, or rubs. Unlabored respirations  HEART: Regular rate and rhythm; No murmurs, rubs, or gallops  ABDOMEN: BSx4; Soft, nontender, nondistended  EXTREMITIES:  2+ Peripheral Pulses, brisk capillary refill. No clubbing, cyanosis, or edema  NERVOUS SYSTEM:  A&Ox3, no focal deficits   SKIN: No rashes or lesions                                         MEDICATIONS:  STANDING MEDICATIONS a    apixaban 5 milliGRAM(s) Oral every 12 hours  citalopram 20 milliGRAM(s) Oral daily  influenza  Vaccine (HIGH DOSE) 0.7 milliLiter(s) IntraMuscular once  levETIRAcetam 500 milliGRAM(s) Oral two times a day  metoprolol tartrate 50 milliGRAM(s) Oral two times a day  nystatin Cream 1 Application(s) Topical two times a day  phenytoin   Capsule 100 milliGRAM(s) Oral three times a day    PRN MEDICATIONS                                            ------------------------------------------------------------  VITAL SIGNS: Last 24 Hours  T(C): 36.1 (21 Jan 2023 19:20), Max: 36.1 (21 Jan 2023 19:20)  T(F): 96.9 (21 Jan 2023 19:20), Max: 96.9 (21 Jan 2023 19:20)  HR: 81 (21 Jan 2023 19:20) (73 - 88)  BP: 102/55 (21 Jan 2023 19:20) (90/53 - 113/73)  BP(mean): --  RR: 18 (21 Jan 2023 19:20) (18 - 18)  SpO2: --      01-20-23 @ 07:01  -  01-21-23 @ 07:00  --------------------------------------------------------  IN: 600 mL / OUT: 400 mL / NET: 200 mL    01-21-23 @ 07:01 - 01-21-23 @ 19:36  --------------------------------------------------------  IN: 595 mL / OUT: 500 mL / NET: 95 mL                                               LABS:                        13.3   4.33  )-----------( 190      ( 21 Jan 2023 06:25 )             40.7     01-20    135  |  102  |  21<H>  ----------------------------<  91  3.9   |  25  |  1.0    Ca    8.4      20 Jan 2023 08:06  Mg     2.1     01-20    TPro  5.6<L>  /  Alb  3.1<L>  /  TBili  0.4  /  DBili  x   /  AST  27  /  ALT  25  /  AlkPhos  108  01-20                                                              RADIOLOGY:                                      ASSESSMENT & PLAN    77F PMHx CVA, seizure disorder, HTN here with shortness of breath.    A/P:   Acute HFrEF: new diagnosis.   SOB, leg edema.   CXR showed bilateral infiltrates.   Echo showed LVEF 28%, severe reduced RV systolic function, mod to severe MR, mild to mod AR, mild TR, mod pulmonary HTN,    Lasix, Aldactone and Losartan on hold due to hypotension.   Continue Metoprolol.   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   Echo showed severe biatrial enlargement.   CT heart of left atrium was negative for clot in RYANN, s/p Cardioversion 1/17, back to sinus rhythm.   Increase Metoprolol to 50mg BID, Continue Eliquis.     Hypotension  Lasix, losartan, spironolactone on hold  continue to hold antihypertensives for now    History of CVA  Family Reports expressive aphasia, Not on ASA or Statin ,     Seizure disorder  Continue Keppra 500 bid and phenytoin 100 tid    DVT Prophylaxis: Lovenox  GI Prophylaxis: None  Code Status: Full    #Progress Note Handoff:  Pending (specify): placement  Family discussion: with her brother, update about diuresis, CHF.   Disposition: STR

## 2023-01-22 LAB
ALBUMIN SERPL ELPH-MCNC: 3.4 G/DL — LOW (ref 3.5–5.2)
ALP SERPL-CCNC: 122 U/L — HIGH (ref 30–115)
ALT FLD-CCNC: 20 U/L — SIGNIFICANT CHANGE UP (ref 0–41)
ANION GAP SERPL CALC-SCNC: 11 MMOL/L — SIGNIFICANT CHANGE UP (ref 7–14)
AST SERPL-CCNC: 21 U/L — SIGNIFICANT CHANGE UP (ref 0–41)
BASOPHILS # BLD AUTO: 0.04 K/UL — SIGNIFICANT CHANGE UP (ref 0–0.2)
BASOPHILS NFR BLD AUTO: 0.8 % — SIGNIFICANT CHANGE UP (ref 0–1)
BILIRUB SERPL-MCNC: 0.4 MG/DL — SIGNIFICANT CHANGE UP (ref 0.2–1.2)
BUN SERPL-MCNC: 23 MG/DL — HIGH (ref 10–20)
CALCIUM SERPL-MCNC: 8.8 MG/DL — SIGNIFICANT CHANGE UP (ref 8.4–10.5)
CHLORIDE SERPL-SCNC: 102 MMOL/L — SIGNIFICANT CHANGE UP (ref 98–110)
CO2 SERPL-SCNC: 25 MMOL/L — SIGNIFICANT CHANGE UP (ref 17–32)
CREAT SERPL-MCNC: 1.2 MG/DL — SIGNIFICANT CHANGE UP (ref 0.7–1.5)
EGFR: 47 ML/MIN/1.73M2 — LOW
EOSINOPHIL # BLD AUTO: 0.18 K/UL — SIGNIFICANT CHANGE UP (ref 0–0.7)
EOSINOPHIL NFR BLD AUTO: 3.8 % — SIGNIFICANT CHANGE UP (ref 0–8)
GLUCOSE SERPL-MCNC: 83 MG/DL — SIGNIFICANT CHANGE UP (ref 70–99)
HCT VFR BLD CALC: 43.6 % — SIGNIFICANT CHANGE UP (ref 37–47)
HGB BLD-MCNC: 13.8 G/DL — SIGNIFICANT CHANGE UP (ref 12–16)
IMM GRANULOCYTES NFR BLD AUTO: 0.4 % — HIGH (ref 0.1–0.3)
LYMPHOCYTES # BLD AUTO: 1.25 K/UL — SIGNIFICANT CHANGE UP (ref 1.2–3.4)
LYMPHOCYTES # BLD AUTO: 26.3 % — SIGNIFICANT CHANGE UP (ref 20.5–51.1)
MAGNESIUM SERPL-MCNC: 2.3 MG/DL — SIGNIFICANT CHANGE UP (ref 1.8–2.4)
MCHC RBC-ENTMCNC: 28.4 PG — SIGNIFICANT CHANGE UP (ref 27–31)
MCHC RBC-ENTMCNC: 31.7 G/DL — LOW (ref 32–37)
MCV RBC AUTO: 89.7 FL — SIGNIFICANT CHANGE UP (ref 81–99)
MONOCYTES # BLD AUTO: 0.59 K/UL — SIGNIFICANT CHANGE UP (ref 0.1–0.6)
MONOCYTES NFR BLD AUTO: 12.4 % — HIGH (ref 1.7–9.3)
NEUTROPHILS # BLD AUTO: 2.67 K/UL — SIGNIFICANT CHANGE UP (ref 1.4–6.5)
NEUTROPHILS NFR BLD AUTO: 56.3 % — SIGNIFICANT CHANGE UP (ref 42.2–75.2)
NRBC # BLD: 0 /100 WBCS — SIGNIFICANT CHANGE UP (ref 0–0)
PLATELET # BLD AUTO: 236 K/UL — SIGNIFICANT CHANGE UP (ref 130–400)
POTASSIUM SERPL-MCNC: 4.5 MMOL/L — SIGNIFICANT CHANGE UP (ref 3.5–5)
POTASSIUM SERPL-SCNC: 4.5 MMOL/L — SIGNIFICANT CHANGE UP (ref 3.5–5)
PROT SERPL-MCNC: 5.7 G/DL — LOW (ref 6–8)
RBC # BLD: 4.86 M/UL — SIGNIFICANT CHANGE UP (ref 4.2–5.4)
RBC # FLD: 15 % — HIGH (ref 11.5–14.5)
SODIUM SERPL-SCNC: 138 MMOL/L — SIGNIFICANT CHANGE UP (ref 135–146)
WBC # BLD: 4.75 K/UL — LOW (ref 4.8–10.8)
WBC # FLD AUTO: 4.75 K/UL — LOW (ref 4.8–10.8)

## 2023-01-22 PROCEDURE — 99232 SBSQ HOSP IP/OBS MODERATE 35: CPT

## 2023-01-22 RX ADMIN — Medication 50 MILLIGRAM(S): at 17:18

## 2023-01-22 RX ADMIN — Medication 100 MILLIGRAM(S): at 05:36

## 2023-01-22 RX ADMIN — LEVETIRACETAM 500 MILLIGRAM(S): 250 TABLET, FILM COATED ORAL at 17:18

## 2023-01-22 RX ADMIN — NYSTATIN CREAM 1 APPLICATION(S): 100000 CREAM TOPICAL at 17:19

## 2023-01-22 RX ADMIN — Medication 100 MILLIGRAM(S): at 16:40

## 2023-01-22 RX ADMIN — APIXABAN 5 MILLIGRAM(S): 2.5 TABLET, FILM COATED ORAL at 17:18

## 2023-01-22 RX ADMIN — CITALOPRAM 20 MILLIGRAM(S): 10 TABLET, FILM COATED ORAL at 11:15

## 2023-01-22 RX ADMIN — Medication 50 MILLIGRAM(S): at 05:36

## 2023-01-22 RX ADMIN — APIXABAN 5 MILLIGRAM(S): 2.5 TABLET, FILM COATED ORAL at 05:36

## 2023-01-22 RX ADMIN — Medication 100 MILLIGRAM(S): at 22:03

## 2023-01-22 RX ADMIN — NYSTATIN CREAM 1 APPLICATION(S): 100000 CREAM TOPICAL at 05:37

## 2023-01-22 RX ADMIN — LEVETIRACETAM 500 MILLIGRAM(S): 250 TABLET, FILM COATED ORAL at 05:36

## 2023-01-22 NOTE — PROGRESS NOTE ADULT - ASSESSMENT
77F PMHx CVA, seizure disorder, HTN here with shortness of breath.    A/P:   Acute HFrEF: new diagnosis.   SOB, leg edema.   CXR showed bilateral infiltrates.   Echo showed LVEF 28%, severe reduced RV systolic function, mod to severe MR, mild to mod AR, mild TR, mod pulmonary HTN,    Lasix, Aldactone and Losartan on hold due to hypotension.   Continue Metoprolol.   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   Echo showed severe biatrial enlargement.   CT heart of left atrium was negative for clot in RYANN, s/p Cardioversion 1/17, back to sinus rhythm.   Increase Metoprolol to 50mg BID, Continue Eliquis.     Hypotension  Lasix, losartan, spironolactone on hold  continue to hold antihypertensives for now    History of CVA  Family Reports expressive aphasia, Not on ASA or Statin ,     Seizure disorder  Continue Keppra 500 bid and phenytoin 100 tid    DVT Prophylaxis: Lovenox  Code Status: Full    #Progress Note Handoff:  Pending (specify): placement  Family discussion: with her brother, update about diuresis, CHF.   Disposition: STR

## 2023-01-22 NOTE — PROGRESS NOTE ADULT - SUBJECTIVE AND OBJECTIVE BOX
HOUSTON KIGN  77y  Female      Patient is a 77y old  Female who presents with a chief complaint of afib (21 Jan 2023 19:35)      INTERVAL HPI/OVERNIGHT EVENTS:  She feels ok, no new complaints.   Vital Signs Last 24 Hrs  T(C): 36.7 (22 Jan 2023 04:38), Max: 36.7 (22 Jan 2023 04:38)  T(F): 98 (22 Jan 2023 04:38), Max: 98 (22 Jan 2023 04:38)  HR: 80 (22 Jan 2023 04:38) (73 - 81)  BP: 124/59 (22 Jan 2023 04:38) (90/53 - 124/59)  BP(mean): --  RR: 18 (22 Jan 2023 04:38) (18 - 18)  SpO2: 98% (22 Jan 2023 05:34) (98% - 98%)    Parameters below as of 22 Jan 2023 05:34  Patient On (Oxygen Delivery Method): room air          01-21-23 @ 07:01  -  01-22-23 @ 07:00  --------------------------------------------------------  IN: 595 mL / OUT: 1000 mL / NET: -405 mL            Consultant(s) Notes Reviewed:  [x ] YES  [ ] NO          MEDICATIONS  (STANDING):  apixaban 5 milliGRAM(s) Oral every 12 hours  citalopram 20 milliGRAM(s) Oral daily  influenza  Vaccine (HIGH DOSE) 0.7 milliLiter(s) IntraMuscular once  levETIRAcetam 500 milliGRAM(s) Oral two times a day  metoprolol tartrate 50 milliGRAM(s) Oral two times a day  nystatin Cream 1 Application(s) Topical two times a day  phenytoin   Capsule 100 milliGRAM(s) Oral three times a day    MEDICATIONS  (PRN):      LABS                          13.8   4.75  )-----------( 236      ( 22 Jan 2023 06:29 )             43.6     01-22    138  |  102  |  23<H>  ----------------------------<  83  4.5   |  25  |  1.2    Ca    8.8      22 Jan 2023 06:29  Mg     2.3     01-22    TPro  5.7<L>  /  Alb  3.4<L>  /  TBili  0.4  /  DBili  x   /  AST  21  /  ALT  20  /  AlkPhos  122<H>  01-22          Lactate Trend        CAPILLARY BLOOD GLUCOSE            RADIOLOGY & ADDITIONAL TESTS:    Imaging Personally Reviewed:  [ ] YES  [ ] NO    HEALTH ISSUES - PROBLEM Dx:          PHYSICAL EXAM:  GENERAL: NAD, well-developed.  HEAD:  Atraumatic, Normocephalic.  EYES: EOMI, PERRLA, conjunctiva and sclera clear.  NECK: Supple, No JVD.  CHEST/LUNG: bibasilar rales.   HEART: Regular rate and rhythm; S1 S2.   ABDOMEN: Soft, Nontender, Nondistended; Bowel sounds present.  EXTREMITIES:  2+ Peripheral Pulses, leg edema 2+  PSYCH: awake, oriented to people, herself,.  NEUROLOGY: non-focal.  SKIN: No rashes or lesions.

## 2023-01-23 LAB
ALBUMIN SERPL ELPH-MCNC: 3.4 G/DL — LOW (ref 3.5–5.2)
ALP SERPL-CCNC: 119 U/L — HIGH (ref 30–115)
ALT FLD-CCNC: 24 U/L — SIGNIFICANT CHANGE UP (ref 0–41)
ANION GAP SERPL CALC-SCNC: 11 MMOL/L — SIGNIFICANT CHANGE UP (ref 7–14)
AST SERPL-CCNC: 27 U/L — SIGNIFICANT CHANGE UP (ref 0–41)
BASOPHILS # BLD AUTO: 0.05 K/UL — SIGNIFICANT CHANGE UP (ref 0–0.2)
BASOPHILS NFR BLD AUTO: 0.9 % — SIGNIFICANT CHANGE UP (ref 0–1)
BILIRUB SERPL-MCNC: 0.4 MG/DL — SIGNIFICANT CHANGE UP (ref 0.2–1.2)
BUN SERPL-MCNC: 25 MG/DL — HIGH (ref 10–20)
CALCIUM SERPL-MCNC: 9.3 MG/DL — SIGNIFICANT CHANGE UP (ref 8.4–10.5)
CHLORIDE SERPL-SCNC: 102 MMOL/L — SIGNIFICANT CHANGE UP (ref 98–110)
CO2 SERPL-SCNC: 25 MMOL/L — SIGNIFICANT CHANGE UP (ref 17–32)
CREAT SERPL-MCNC: 1.2 MG/DL — SIGNIFICANT CHANGE UP (ref 0.7–1.5)
EGFR: 47 ML/MIN/1.73M2 — LOW
EOSINOPHIL # BLD AUTO: 0.18 K/UL — SIGNIFICANT CHANGE UP (ref 0–0.7)
EOSINOPHIL NFR BLD AUTO: 3.4 % — SIGNIFICANT CHANGE UP (ref 0–8)
GLUCOSE SERPL-MCNC: 94 MG/DL — SIGNIFICANT CHANGE UP (ref 70–99)
HCT VFR BLD CALC: 42.3 % — SIGNIFICANT CHANGE UP (ref 37–47)
HGB BLD-MCNC: 13.8 G/DL — SIGNIFICANT CHANGE UP (ref 12–16)
IMM GRANULOCYTES NFR BLD AUTO: 0.4 % — HIGH (ref 0.1–0.3)
LYMPHOCYTES # BLD AUTO: 1.3 K/UL — SIGNIFICANT CHANGE UP (ref 1.2–3.4)
LYMPHOCYTES # BLD AUTO: 24.3 % — SIGNIFICANT CHANGE UP (ref 20.5–51.1)
MAGNESIUM SERPL-MCNC: 2.4 MG/DL — SIGNIFICANT CHANGE UP (ref 1.8–2.4)
MCHC RBC-ENTMCNC: 28.8 PG — SIGNIFICANT CHANGE UP (ref 27–31)
MCHC RBC-ENTMCNC: 32.6 G/DL — SIGNIFICANT CHANGE UP (ref 32–37)
MCV RBC AUTO: 88.1 FL — SIGNIFICANT CHANGE UP (ref 81–99)
MONOCYTES # BLD AUTO: 0.57 K/UL — SIGNIFICANT CHANGE UP (ref 0.1–0.6)
MONOCYTES NFR BLD AUTO: 10.7 % — HIGH (ref 1.7–9.3)
NEUTROPHILS # BLD AUTO: 3.22 K/UL — SIGNIFICANT CHANGE UP (ref 1.4–6.5)
NEUTROPHILS NFR BLD AUTO: 60.3 % — SIGNIFICANT CHANGE UP (ref 42.2–75.2)
NRBC # BLD: 0 /100 WBCS — SIGNIFICANT CHANGE UP (ref 0–0)
PLATELET # BLD AUTO: 218 K/UL — SIGNIFICANT CHANGE UP (ref 130–400)
POTASSIUM SERPL-MCNC: 5 MMOL/L — SIGNIFICANT CHANGE UP (ref 3.5–5)
POTASSIUM SERPL-SCNC: 5 MMOL/L — SIGNIFICANT CHANGE UP (ref 3.5–5)
PROT SERPL-MCNC: 5.9 G/DL — LOW (ref 6–8)
RBC # BLD: 4.8 M/UL — SIGNIFICANT CHANGE UP (ref 4.2–5.4)
RBC # FLD: 15.2 % — HIGH (ref 11.5–14.5)
SARS-COV-2 RNA SPEC QL NAA+PROBE: SIGNIFICANT CHANGE UP
SODIUM SERPL-SCNC: 138 MMOL/L — SIGNIFICANT CHANGE UP (ref 135–146)
WBC # BLD: 5.34 K/UL — SIGNIFICANT CHANGE UP (ref 4.8–10.8)
WBC # FLD AUTO: 5.34 K/UL — SIGNIFICANT CHANGE UP (ref 4.8–10.8)

## 2023-01-23 PROCEDURE — 99232 SBSQ HOSP IP/OBS MODERATE 35: CPT

## 2023-01-23 RX ORDER — LOSARTAN POTASSIUM 100 MG/1
25 TABLET, FILM COATED ORAL DAILY
Refills: 0 | Status: DISCONTINUED | OUTPATIENT
Start: 2023-01-23 | End: 2023-01-25

## 2023-01-23 RX ORDER — LOSARTAN POTASSIUM 100 MG/1
1 TABLET, FILM COATED ORAL
Qty: 0 | Refills: 0 | DISCHARGE
Start: 2023-01-23

## 2023-01-23 RX ADMIN — Medication 50 MILLIGRAM(S): at 05:39

## 2023-01-23 RX ADMIN — CITALOPRAM 20 MILLIGRAM(S): 10 TABLET, FILM COATED ORAL at 11:05

## 2023-01-23 RX ADMIN — Medication 50 MILLIGRAM(S): at 17:28

## 2023-01-23 RX ADMIN — APIXABAN 5 MILLIGRAM(S): 2.5 TABLET, FILM COATED ORAL at 17:28

## 2023-01-23 RX ADMIN — LOSARTAN POTASSIUM 25 MILLIGRAM(S): 100 TABLET, FILM COATED ORAL at 11:05

## 2023-01-23 RX ADMIN — LEVETIRACETAM 500 MILLIGRAM(S): 250 TABLET, FILM COATED ORAL at 17:28

## 2023-01-23 RX ADMIN — LEVETIRACETAM 500 MILLIGRAM(S): 250 TABLET, FILM COATED ORAL at 05:39

## 2023-01-23 RX ADMIN — NYSTATIN CREAM 1 APPLICATION(S): 100000 CREAM TOPICAL at 05:39

## 2023-01-23 RX ADMIN — Medication 100 MILLIGRAM(S): at 13:02

## 2023-01-23 RX ADMIN — Medication 100 MILLIGRAM(S): at 21:07

## 2023-01-23 RX ADMIN — NYSTATIN CREAM 1 APPLICATION(S): 100000 CREAM TOPICAL at 17:28

## 2023-01-23 RX ADMIN — Medication 100 MILLIGRAM(S): at 05:39

## 2023-01-23 RX ADMIN — APIXABAN 5 MILLIGRAM(S): 2.5 TABLET, FILM COATED ORAL at 05:39

## 2023-01-23 NOTE — PROGRESS NOTE ADULT - ASSESSMENT
77F PMHx CVA, seizure disorder, HTN here with shortness of breath.    A/P:   Acute HFrEF: new diagnosis.   SOB, leg edema.   CXR showed bilateral infiltrates.   Echo showed LVEF 28%, severe reduced RV systolic function, mod to severe MR, mild to mod AR, mild TR, mod pulmonary HTN,    Lasix, Aldactone and Losartan on hold due to hypotension.   Continue Metoprolol. BP improved, resume Losartan 25mg daily, can resume Aldactone if BP remain stable.   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   Echo showed severe biatrial enlargement.   CT heart of left atrium was negative for clot in RYANN, s/p Cardioversion 1/17, back to sinus rhythm.   Increase Metoprolol to 50mg BID, Continue Eliquis.     Hypotension: improved, likely from antihypertensive medication.   Lasix, losartan, spironolactone on hold  Resume Losartan as above.     History of CVA  Family Reports expressive aphasia, Not on ASA or Statin ,     Seizure disorder  Continue Keppra 500 bid and phenytoin 100 tid    DVT Prophylaxis: Lovenox  Code Status: Full    #Progress Note Handoff:  Pending (specify): placement  Family discussion: with her brother, update about diuresis, CHF.   Disposition: STR

## 2023-01-23 NOTE — PROGRESS NOTE ADULT - SUBJECTIVE AND OBJECTIVE BOX
HOUSTON KING  77y  Female      Patient is a 77y old  Female who presents with a chief complaint of afib (22 Jan 2023 13:06)      INTERVAL HPI/OVERNIGHT EVENTS:  She feels ok, no new complaints.   Vital Signs Last 24 Hrs  T(C): 36.6 (23 Jan 2023 05:15), Max: 36.6 (23 Jan 2023 05:15)  T(F): 97.9 (23 Jan 2023 05:15), Max: 97.9 (23 Jan 2023 05:15)  HR: 82 (23 Jan 2023 05:15) (77 - 82)  BP: 131/71 (23 Jan 2023 05:15) (110/61 - 131/71)  BP(mean): --  RR: 18 (23 Jan 2023 05:15) (17 - 18)  SpO2: 98% (23 Jan 2023 05:15) (98% - 98%)    Parameters below as of 23 Jan 2023 05:15  Patient On (Oxygen Delivery Method): room air          01-22-23 @ 07:01  -  01-23-23 @ 07:00  --------------------------------------------------------  IN: 480 mL / OUT: 875 mL / NET: -395 mL            Consultant(s) Notes Reviewed:  [x ] YES  [ ] NO          MEDICATIONS  (STANDING):  apixaban 5 milliGRAM(s) Oral every 12 hours  citalopram 20 milliGRAM(s) Oral daily  influenza  Vaccine (HIGH DOSE) 0.7 milliLiter(s) IntraMuscular once  levETIRAcetam 500 milliGRAM(s) Oral two times a day  losartan 25 milliGRAM(s) Oral daily  metoprolol tartrate 50 milliGRAM(s) Oral two times a day  nystatin Cream 1 Application(s) Topical two times a day  phenytoin   Capsule 100 milliGRAM(s) Oral three times a day    MEDICATIONS  (PRN):      LABS                          13.8   5.34  )-----------( 218      ( 23 Jan 2023 06:15 )             42.3     01-23    138  |  102  |  25<H>  ----------------------------<  94  5.0   |  25  |  1.2    Ca    9.3      23 Jan 2023 06:15  Mg     2.4     01-23    TPro  5.9<L>  /  Alb  3.4<L>  /  TBili  0.4  /  DBili  x   /  AST  27  /  ALT  24  /  AlkPhos  119<H>  01-23          Lactate Trend        CAPILLARY BLOOD GLUCOSE            RADIOLOGY & ADDITIONAL TESTS:    Imaging Personally Reviewed:  [ ] YES  [ ] NO    HEALTH ISSUES - PROBLEM Dx:          PHYSICAL EXAM:  GENERAL: NAD, well-developed.  HEAD:  Atraumatic, Normocephalic.  EYES: EOMI, PERRLA, conjunctiva and sclera clear.  NECK: Supple, No JVD.  CHEST/LUNG: bibasilar rales.   HEART: Regular rate and rhythm; S1 S2.   ABDOMEN: Soft, Nontender, Nondistended; Bowel sounds present.  EXTREMITIES:  2+ Peripheral Pulses, leg edema 2+  PSYCH: awake, oriented to people, herself,.  NEUROLOGY: non-focal.  SKIN: No rashes or lesions.

## 2023-01-24 LAB — GLUCOSE BLDC GLUCOMTR-MCNC: 218 MG/DL — HIGH (ref 70–99)

## 2023-01-24 PROCEDURE — 99232 SBSQ HOSP IP/OBS MODERATE 35: CPT

## 2023-01-24 RX ORDER — ACETAMINOPHEN 500 MG
650 TABLET ORAL EVERY 6 HOURS
Refills: 0 | Status: DISCONTINUED | OUTPATIENT
Start: 2023-01-24 | End: 2023-01-26

## 2023-01-24 RX ADMIN — Medication 100 MILLIGRAM(S): at 05:21

## 2023-01-24 RX ADMIN — LEVETIRACETAM 500 MILLIGRAM(S): 250 TABLET, FILM COATED ORAL at 17:13

## 2023-01-24 RX ADMIN — NYSTATIN CREAM 1 APPLICATION(S): 100000 CREAM TOPICAL at 05:21

## 2023-01-24 RX ADMIN — Medication 50 MILLIGRAM(S): at 05:21

## 2023-01-24 RX ADMIN — LOSARTAN POTASSIUM 25 MILLIGRAM(S): 100 TABLET, FILM COATED ORAL at 05:21

## 2023-01-24 RX ADMIN — CITALOPRAM 20 MILLIGRAM(S): 10 TABLET, FILM COATED ORAL at 11:56

## 2023-01-24 RX ADMIN — Medication 100 MILLIGRAM(S): at 21:04

## 2023-01-24 RX ADMIN — APIXABAN 5 MILLIGRAM(S): 2.5 TABLET, FILM COATED ORAL at 17:13

## 2023-01-24 RX ADMIN — NYSTATIN CREAM 1 APPLICATION(S): 100000 CREAM TOPICAL at 17:13

## 2023-01-24 RX ADMIN — Medication 50 MILLIGRAM(S): at 17:13

## 2023-01-24 RX ADMIN — APIXABAN 5 MILLIGRAM(S): 2.5 TABLET, FILM COATED ORAL at 05:21

## 2023-01-24 RX ADMIN — Medication 100 MILLIGRAM(S): at 16:04

## 2023-01-24 RX ADMIN — LEVETIRACETAM 500 MILLIGRAM(S): 250 TABLET, FILM COATED ORAL at 05:21

## 2023-01-24 NOTE — PROGRESS NOTE ADULT - ASSESSMENT
77F PMHx CVA, seizure disorder, HTN here with shortness of breath.    A/P:   Acute HFrEF: new diagnosis.   SOB, leg edema.   CXR showed bilateral infiltrates.   Echo showed LVEF 28%, severe reduced RV systolic function, mod to severe MR, mild to mod AR, mild TR, mod pulmonary HTN,    Lasix, Aldactone and Losartan on hold due to hypotension.   Continue Metoprolol. BP improved, resume Losartan 25mg daily, can resume Aldactone if BP remain stable.   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   Echo showed severe biatrial enlargement.   CT heart of left atrium was negative for clot in RYANN, s/p Cardioversion 1/17, back to sinus rhythm.   Continue Metoprolol 50mg BID, Continue Eliquis.     Hypotension: improved, likely from antihypertensive medication.   Lasix, spironolactone on hold  Resume Losartan as above.     History of CVA  Family Reports expressive aphasia, Not on ASA or Statin ,     Seizure disorder  Continue Keppra 500 bid and phenytoin 100 tid    DVT Prophylaxis: Lovenox  Code Status: Full    #Progress Note Handoff:  Pending (specify): placement  Family discussion: with her brother, update about discharge.   Disposition: STR

## 2023-01-24 NOTE — PROGRESS NOTE ADULT - SUBJECTIVE AND OBJECTIVE BOX
HOUSTON KING  77y  Female      Patient is a 77y old  Female who presents with a chief complaint of afib (23 Jan 2023 14:25)      INTERVAL HPI/OVERNIGHT EVENTS:  She feels ok, no chest pain or SOB.   Vital Signs Last 24 Hrs  T(C): 36.3 (24 Jan 2023 13:30), Max: 36.4 (24 Jan 2023 04:38)  T(F): 97.3 (24 Jan 2023 13:30), Max: 97.6 (24 Jan 2023 04:38)  HR: 71 (24 Jan 2023 13:30) (69 - 72)  BP: 99/57 (24 Jan 2023 13:30) (92/53 - 101/60)  BP(mean): --  RR: 17 (24 Jan 2023 13:30) (17 - 18)  SpO2: --          01-23-23 @ 07:01  -  01-24-23 @ 07:00  --------------------------------------------------------  IN: 300 mL / OUT: 650 mL / NET: -350 mL    01-24-23 @ 07:01  -  01-24-23 @ 13:41  --------------------------------------------------------  IN: 180 mL / OUT: 0 mL / NET: 180 mL            Consultant(s) Notes Reviewed:  [x ] YES  [ ] NO          MEDICATIONS  (STANDING):  apixaban 5 milliGRAM(s) Oral every 12 hours  citalopram 20 milliGRAM(s) Oral daily  influenza  Vaccine (HIGH DOSE) 0.7 milliLiter(s) IntraMuscular once  levETIRAcetam 500 milliGRAM(s) Oral two times a day  losartan 25 milliGRAM(s) Oral daily  metoprolol tartrate 50 milliGRAM(s) Oral two times a day  nystatin Cream 1 Application(s) Topical two times a day  phenytoin   Capsule 100 milliGRAM(s) Oral three times a day    MEDICATIONS  (PRN):      LABS                          13.8   5.34  )-----------( 218      ( 23 Jan 2023 06:15 )             42.3     01-23    138  |  102  |  25<H>  ----------------------------<  94  5.0   |  25  |  1.2    Ca    9.3      23 Jan 2023 06:15  Mg     2.4     01-23    TPro  5.9<L>  /  Alb  3.4<L>  /  TBili  0.4  /  DBili  x   /  AST  27  /  ALT  24  /  AlkPhos  119<H>  01-23          Lactate Trend        CAPILLARY BLOOD GLUCOSE      POCT Blood Glucose.: 218 mg/dL (24 Jan 2023 07:59)        RADIOLOGY & ADDITIONAL TESTS:    Imaging Personally Reviewed:  [ ] YES  [ ] NO    HEALTH ISSUES - PROBLEM Dx:          PHYSICAL EXAM:  GENERAL: NAD, well-developed.  HEAD:  Atraumatic, Normocephalic.  EYES: EOMI, PERRLA, conjunctiva and sclera clear.  NECK: Supple, No JVD.  CHEST/LUNG: bibasilar rales.   HEART: Regular rate and rhythm; S1 S2.   ABDOMEN: Soft, Nontender, Nondistended; Bowel sounds present.  EXTREMITIES:  2+ Peripheral Pulses, leg edema 2+  PSYCH: awake, oriented to people, herself,.  NEUROLOGY: non-focal.  SKIN: No rashes or lesions.

## 2023-01-25 LAB
GLUCOSE BLDC GLUCOMTR-MCNC: 93 MG/DL — SIGNIFICANT CHANGE UP (ref 70–99)
SARS-COV-2 RNA SPEC QL NAA+PROBE: SIGNIFICANT CHANGE UP

## 2023-01-25 PROCEDURE — 99232 SBSQ HOSP IP/OBS MODERATE 35: CPT

## 2023-01-25 RX ORDER — ATORVASTATIN CALCIUM 80 MG/1
40 TABLET, FILM COATED ORAL AT BEDTIME
Refills: 0 | Status: DISCONTINUED | OUTPATIENT
Start: 2023-01-25 | End: 2023-01-26

## 2023-01-25 RX ORDER — SACUBITRIL AND VALSARTAN 24; 26 MG/1; MG/1
1 TABLET, FILM COATED ORAL
Refills: 0 | Status: DISCONTINUED | OUTPATIENT
Start: 2023-01-25 | End: 2023-01-26

## 2023-01-25 RX ORDER — ASPIRIN/CALCIUM CARB/MAGNESIUM 324 MG
81 TABLET ORAL DAILY
Refills: 0 | Status: DISCONTINUED | OUTPATIENT
Start: 2023-01-25 | End: 2023-01-26

## 2023-01-25 RX ADMIN — NYSTATIN CREAM 1 APPLICATION(S): 100000 CREAM TOPICAL at 05:06

## 2023-01-25 RX ADMIN — LEVETIRACETAM 500 MILLIGRAM(S): 250 TABLET, FILM COATED ORAL at 17:01

## 2023-01-25 RX ADMIN — CITALOPRAM 20 MILLIGRAM(S): 10 TABLET, FILM COATED ORAL at 11:01

## 2023-01-25 RX ADMIN — ATORVASTATIN CALCIUM 40 MILLIGRAM(S): 80 TABLET, FILM COATED ORAL at 21:47

## 2023-01-25 RX ADMIN — LOSARTAN POTASSIUM 25 MILLIGRAM(S): 100 TABLET, FILM COATED ORAL at 05:06

## 2023-01-25 RX ADMIN — Medication 81 MILLIGRAM(S): at 11:01

## 2023-01-25 RX ADMIN — Medication 100 MILLIGRAM(S): at 05:06

## 2023-01-25 RX ADMIN — NYSTATIN CREAM 1 APPLICATION(S): 100000 CREAM TOPICAL at 17:02

## 2023-01-25 RX ADMIN — Medication 50 MILLIGRAM(S): at 05:06

## 2023-01-25 RX ADMIN — Medication 100 MILLIGRAM(S): at 13:01

## 2023-01-25 RX ADMIN — SACUBITRIL AND VALSARTAN 1 TABLET(S): 24; 26 TABLET, FILM COATED ORAL at 17:02

## 2023-01-25 RX ADMIN — APIXABAN 5 MILLIGRAM(S): 2.5 TABLET, FILM COATED ORAL at 05:06

## 2023-01-25 RX ADMIN — LEVETIRACETAM 500 MILLIGRAM(S): 250 TABLET, FILM COATED ORAL at 05:06

## 2023-01-25 RX ADMIN — Medication 100 MILLIGRAM(S): at 21:47

## 2023-01-25 RX ADMIN — APIXABAN 5 MILLIGRAM(S): 2.5 TABLET, FILM COATED ORAL at 17:01

## 2023-01-25 NOTE — PROGRESS NOTE ADULT - SUBJECTIVE AND OBJECTIVE BOX
HOUSTON KING  77y  Female      Patient is a 77y old  Female who presents with a chief complaint of afib (23 Jan 2023 14:25)      INTERVAL HPI/OVERNIGHT EVENTS:  She feels well, no chest pain or SOB.   Vital Signs Last 24 Hrs  T(C): 35.9 (25 Jan 2023 05:01), Max: 36.3 (24 Jan 2023 13:30)  T(F): 96.6 (25 Jan 2023 05:01), Max: 97.3 (24 Jan 2023 13:30)  HR: 78 (25 Jan 2023 05:01) (71 - 83)  BP: 125/56 (25 Jan 2023 05:01) (99/57 - 151/63)  BP(mean): --  RR: 19 (25 Jan 2023 05:01) (17 - 19)  SpO2: 99% (25 Jan 2023 05:01) (98% - 99%)    Parameters below as of 25 Jan 2023 05:01  Patient On (Oxygen Delivery Method): room air        01-23-23 @ 07:01 - 01-24-23 @ 07:00  --------------------------------------------------------  IN: 300 mL / OUT: 650 mL / NET: -350 mL    01-24-23 @ 07:01 - 01-24-23 @ 13:41  --------------------------------------------------------  IN: 180 mL / OUT: 0 mL / NET: 180 mL            Consultant(s) Notes Reviewed:  [x ] YES  [ ] NO          MEDICATIONS  (STANDING):  apixaban 5 milliGRAM(s) Oral every 12 hours  citalopram 20 milliGRAM(s) Oral daily  influenza  Vaccine (HIGH DOSE) 0.7 milliLiter(s) IntraMuscular once  levETIRAcetam 500 milliGRAM(s) Oral two times a day  losartan 25 milliGRAM(s) Oral daily  metoprolol tartrate 50 milliGRAM(s) Oral two times a day  nystatin Cream 1 Application(s) Topical two times a day  phenytoin   Capsule 100 milliGRAM(s) Oral three times a day    MEDICATIONS  (PRN):      LABS                          13.8   5.34  )-----------( 218      ( 23 Jan 2023 06:15 )             42.3     01-23    138  |  102  |  25<H>  ----------------------------<  94  5.0   |  25  |  1.2    Ca    9.3      23 Jan 2023 06:15  Mg     2.4     01-23    TPro  5.9<L>  /  Alb  3.4<L>  /  TBili  0.4  /  DBili  x   /  AST  27  /  ALT  24  /  AlkPhos  119<H>  01-23          Lactate Trend        CAPILLARY BLOOD GLUCOSE      POCT Blood Glucose.: 218 mg/dL (24 Jan 2023 07:59)        RADIOLOGY & ADDITIONAL TESTS:    Imaging Personally Reviewed:  [ ] YES  [ ] NO    HEALTH ISSUES - PROBLEM Dx:          PHYSICAL EXAM:  GENERAL: NAD, well-developed.  HEAD:  Atraumatic, Normocephalic.  EYES: EOMI, PERRLA, conjunctiva and sclera clear.  NECK: Supple, No JVD.  CHEST/LUNG: bibasilar rales.   HEART: Regular rate and rhythm; S1 S2.   ABDOMEN: Soft, Nontender, Nondistended; Bowel sounds present.  EXTREMITIES:  2+ Peripheral Pulses, leg edema 2+  PSYCH: awake, oriented to people, herself,.  NEUROLOGY: non-focal.  SKIN: No rashes or lesions.

## 2023-01-25 NOTE — PROGRESS NOTE ADULT - ASSESSMENT
a/p:  77F PMHx CVA, seizure disorder, HTN here with shortness of breath.    #Acute HFrEF (possible tachycardia induced cardiomyopathy)  #AF (new) with RVR --now rate controlled  #h/o CVA with expressive aphasia  -started on noac (eliquis 5 mg bid)  -rate controlled with bblocker (metoprolol 50 mg bid)  -will need repeat echo done as outpatient   -pcp- Dr Henriquez  -restart statin 40 mg and asa 81 mg (unclear why being held 2/2 h/o prior CVA)  -dc losartan and initiated entresto 24/26 mg bid  -f/u with cardiology as outaptient  -ECHO- biatrial enlargement  -CT cardiac neg for clot in RYANN--s/p cardioversion 1/17     #Seizure disorder  -Continue Keppra 500 bid and phenytoin 100 tid  -seizure precautions  -monitor levels    DVT/GI ppx  guarded prognosis    FULL CODE    #Progress Note Handoff:  Family discussion: with her sister in law bedside-- update about discharge--pending auth for STR ([facility])     a/p:  77F PMHx CVA, seizure disorder, HTN here with shortness of breath.    #Acute HFrEF (possible tachycardia induced cardiomyopathy)  #AF (new) with RVR --now rate controlled  #h/o CVA with expressive aphasia  -started on noac (eliquis 5 mg bid)  -rate controlled with bblocker (metoprolol 50 mg bid)  -will need repeat echo done as outpatient   -pcp- Dr Henriquez  -restart statin 40 mg and asa 81 mg (unclear why being held 2/2 h/o prior CVA)  -dc losartan and initiated entresto 24/26 mg bid  -f/u with cardiology as outaptient  -ECHO- biatrial enlargement  -CT cardiac neg for clot in RYNAN--s/p cardioversion 1/17     #Seizure disorder  -Continue Keppra 500 bid and phenytoin 100 tid  -seizure precautions  -monitor levels    DVT/GI ppx  guarded prognosis    FULL CODE    #Progress Note Handoff:  Family discussion: with her sister in law bedside-- update about discharge--pending auth for STR ([facility])    Total time spent to complete patient's bedside assessment, review medical chart, discuss medical plan of care with covering medical team was more than 35 minutes  with >50% of time spendt face to face with patient, discussion with patient/family and/or coordination of care

## 2023-01-25 NOTE — PROGRESS NOTE ADULT - SUBJECTIVE AND OBJECTIVE BOX
Patient is a 77y old  Female who presents with a chief complaint of afib (25 Jan 2023 12:37)    HPI:  77-year-old female past medical history of CVA, aphasic at baseline cannot provide detailed history, seizure prophylaxis (on phenytoin and Keppra), high blood pressure presents with generalized weakness over the past 4 days associated with shortness of breath and lower extremity bilateral worsening edema.  Per family at bedside on Tuesday approximately 4 days ago they went to check up on the patient as she was having nonbloody diarrhea and noticed she was short of breath spoke to the primary care doctor that they followed up with today and had an EKG done that showed new onset atrial fibrillation and was sent to the emergency department.   . (06 Jan 2023 21:00)    PAST MEDICAL & SURGICAL HISTORY:  Hypertension  Aphasia due to acute cerebrovascular accident (CVA)    HOSPITAL DAY #19- downgraded from 3C telemetry    no overnight events---d/w sister in law bedside- awaiting dispo plan to STR pending auth ([facility])    pcp- Dr. Ruiz    Vital Signs Last 24 Hrs  T(C): 35.9 (25 Jan 2023 05:01), Max: 36.3 (24 Jan 2023 13:30)  T(F): 96.6 (25 Jan 2023 05:01), Max: 97.3 (24 Jan 2023 13:30)  HR: 78 (25 Jan 2023 05:01) (71 - 83)  BP: 125/56 (25 Jan 2023 05:01) (99/57 - 151/63)  BP(mean): --  RR: 19 (25 Jan 2023 05:01) (17 - 19)  SpO2: 99% (25 Jan 2023 05:01) (98% - 99%)    Parameters below as of 25 Jan 2023 05:01  Patient On (Oxygen Delivery Method): room air    PE:  GEN-NAD,oob in chair- comfortable  PULM-  fair air entry  CVS- +s1/s2 RRR   GI- soft NT ND +bs, no rebound, no guarding  EXT- no edema      MEDICATIONS  (STANDING):  apixaban 5 milliGRAM(s) Oral every 12 hours  aspirin enteric coated 81 milliGRAM(s) Oral daily  atorvastatin 40 milliGRAM(s) Oral at bedtime  citalopram 20 milliGRAM(s) Oral daily  influenza  Vaccine (HIGH DOSE) 0.7 milliLiter(s) IntraMuscular once  levETIRAcetam 500 milliGRAM(s) Oral two times a day  metoprolol tartrate 50 milliGRAM(s) Oral two times a day  nystatin Cream 1 Application(s) Topical two times a day  phenytoin   Capsule 100 milliGRAM(s) Oral three times a day  sacubitril 24 mG/valsartan 26 mG 1 Tablet(s) Oral two times a day

## 2023-01-25 NOTE — OCCUPATIONAL THERAPY INITIAL EVALUATION ADULT - LEVEL OF INDEPENDENCE: SIT/STAND, REHAB EVAL
Patient Instructions by Pina Little MD at 03/12/18 03:46 PM     Author:  Pina Little MD Service:  (none) Author Type:  Physician     Filed:  03/12/18 03:46 PM Encounter Date:  3/12/2018 Status:  Signed     :  Pina Little MD (Physician)              PATIENT INFORMATION  Follow-Up  - Return for your 3 year well child visit.    9 months old Health and Safety Tips - The following hyperlinks are available to access via MyChart    [employer] 9 Months Old Parent Education    [employer] 9 meses de edad (Educación para los padres) - Español    Additional Educational Resources:  For additional resources regarding your symptoms, diagnosis, or further health information, please visit the Online Health Resources section in [facility].      Revision History        User Key Date/Time User Provider Type Action    > [N/A] 03/12/18 03:46 PM Pina Little MD Physician Sign            
moderate assist (50% patients effort)

## 2023-01-25 NOTE — OCCUPATIONAL THERAPY INITIAL EVALUATION ADULT - PERTINENT HX OF CURRENT PROBLEM, REHAB EVAL
77-year-old female past medical history of CVA, aphasic at baseline cannot provide detailed history, seizure prophylaxis (on phenytoin and Keppra), high blood pressure presents with generalized weakness over the past 4 days associated with shortness of breath and lower extremity bilateral worsening edema.  Per family at bedside on Tuesday approximately 4 days ago they went to check up on the patient as she was having nonbloody diarrhea and noticed she was short of breath spoke to the primary care doctor that they followed up with today and had an EKG done that showed new onset atrial fibrillation and was sent to the emergency department.

## 2023-01-25 NOTE — PROGRESS NOTE ADULT - ASSESSMENT
77F PMHx CVA, seizure disorder, HTN here with shortness of breath. Found to be in new onset afib and HFrEF      #Acute HFrEF: new diagnosis.   - SOB, leg edema.   - CXR showed bilateral infiltrates.   - Echo showed LVEF 28%, severe reduced RV systolic function, mod to severe MR, mild to mod AR, mild TR, mod pulmonary HTN,    - c/w metoprolol, transition losartan to entresto    - Can resume Aldactone if BP remain stable.   - Per Cardiology cardiomyopathy is likely from tachycardia   - Low sodium diet,     #New Paroxysmal Atrial Fibrillation with Rapid Ventricular Response  - HR is controlled today.   - Echo showed severe biatrial enlargement.   - CT heart of left atrium was negative for clot in RYANN, s/p Cardioversion 1/17, back to sinus rhythm.   - Continue Metoprolol 50mg BID, Continue Eliquis.     #Hypotension: improved, likely from antihypertensive medication.   - Lasix, spironolactone on hold    #History of CVA  - Family Reports expressive aphasia,   - Start asa and statin     #Seizure disorder  -Continue Keppra 500 bid and phenytoin 100 tid    DVT Prophylaxis: Lovenox  Code Status: Full    #Progress Note Handoff:  Pending (specify): placement (awaiting auth)  Family discussion: with her brother, update about discharge.   Disposition: STR

## 2023-01-26 VITALS
RESPIRATION RATE: 19 BRPM | SYSTOLIC BLOOD PRESSURE: 111 MMHG | DIASTOLIC BLOOD PRESSURE: 59 MMHG | OXYGEN SATURATION: 97 % | TEMPERATURE: 98 F | HEART RATE: 88 BPM

## 2023-01-26 PROCEDURE — 99239 HOSP IP/OBS DSCHRG MGMT >30: CPT

## 2023-01-26 RX ORDER — ATORVASTATIN CALCIUM 80 MG/1
1 TABLET, FILM COATED ORAL
Qty: 30 | Refills: 0
Start: 2023-01-26

## 2023-01-26 RX ORDER — ASPIRIN/CALCIUM CARB/MAGNESIUM 324 MG
1 TABLET ORAL
Qty: 0 | Refills: 0 | DISCHARGE
Start: 2023-01-26

## 2023-01-26 RX ORDER — SACUBITRIL AND VALSARTAN 24; 26 MG/1; MG/1
1 TABLET, FILM COATED ORAL
Qty: 60 | Refills: 0
Start: 2023-01-26

## 2023-01-26 RX ADMIN — NYSTATIN CREAM 1 APPLICATION(S): 100000 CREAM TOPICAL at 05:40

## 2023-01-26 RX ADMIN — Medication 81 MILLIGRAM(S): at 12:01

## 2023-01-26 RX ADMIN — SACUBITRIL AND VALSARTAN 1 TABLET(S): 24; 26 TABLET, FILM COATED ORAL at 05:39

## 2023-01-26 RX ADMIN — LEVETIRACETAM 500 MILLIGRAM(S): 250 TABLET, FILM COATED ORAL at 05:39

## 2023-01-26 RX ADMIN — CITALOPRAM 20 MILLIGRAM(S): 10 TABLET, FILM COATED ORAL at 12:01

## 2023-01-26 RX ADMIN — Medication 100 MILLIGRAM(S): at 05:39

## 2023-01-26 RX ADMIN — APIXABAN 5 MILLIGRAM(S): 2.5 TABLET, FILM COATED ORAL at 05:39

## 2023-01-26 RX ADMIN — Medication 50 MILLIGRAM(S): at 05:39

## 2023-01-26 NOTE — CHART NOTE - NSCHARTNOTEFT_GEN_A_CORE
Brief Nutrition Note: met pt for low risk f/u, pt reportedly being discharged today, confirms PO appetite and intake, no questions or concerns for RD at this time. If pt is not discharged, f/u x 7 days (low risk).    or TEAMS

## 2023-01-26 NOTE — PROGRESS NOTE ADULT - REASON FOR ADMISSION
afib

## 2023-01-26 NOTE — PROGRESS NOTE ADULT - SUBJECTIVE AND OBJECTIVE BOX
Reason For Visit  MARGRET NGUYỄN is a(n) established patient here today for a 5-5 yo C.   Patient accompanied by mother .      Quality    Pediatric Wellness CI height documented, patient education given about proper diet, discussion of regular exercise, printed information given for activities, discussion of using electronics 2-3 per day, no secondhand smoke from cigarettes, no tobacco use, no preventive medicine therapy for influenza and patient accompanied by mother.   Secondhand Smoke CI no exposure to secondhand smoke from cigarettes and did not provide intervention and counseling on cessation of tobacco use.      History of Present Illness  General Health:   Caregiver concerns:   Nutrition/Elimination: Current diet includes: 15 ounces of milk/day (Type of Milk: VITAMIN D), 25-30 ounces of water/day, 10-15 ounces of juice/day, 12 ounces of soda/day, 1 servings of fruit/day, 1 servings of vegetables/day, 1 servings of meat/day, 1 servings of starch/day and 2 servings of dairy products.   Dental Health: The patient brushes 2 time(s) a day and reports regular dental visits.   Does the child frequently snack or drink sugar containing beverages between meals? No   Sleep: She sleeps from 8-8:30PM and until 8-8;30AM. She Naps for 1-2 hours during the day. She sleeps alone in a bed.   Behavior:       7 yr check up  first time seeing but sounds like she has been healthy  now in pre k, dual language school  will start [employer] in the fall, also dual language  healthy  eats well, growing well  sleeps good  not ill too much.      Review of Systems    All other systems reviewed and negative.      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 healthy, happy, friendly and cooperative girl, very talkative, sociable.   Head and Face: normal facies.   Eyes: conjugate gaze.   ENT: examination of the tympanic membrane showed normal landmarks. no nasal discharge. no dental caries. oral mucosa pink and moist and normal appearing pharynx.   Neck: supple neck.   Lymphatic: no lymphadenopathy.   Pulmonary: breath sounds clear to auscultation bilaterally.   Cardiovascular: normal rate, no murmurs were heard and regular rhythm.   Abdomen: soft and no abdominal mass.   Musculoskeletal: normal gait. muscle strength and tone were normal.   Genitourinary: the external genitalia showed no abnormalities.   Neurologic: no sensory deficits noted. no coordination deficits.   Psychiatric: normal attention span.   Skin: normal skin color and pigmentation, no rash.        Discussion/Summary    Additional Impression:. sweet child, healthy, happy, very well adjusted   nice family  is safe tho gave advice [place] for cars, also for the winter, cold season ect  offered flu booster... but declined  KG boosters today   previously had a Hg was OK  dental care discussed as well  see again next yr.   Information discussed with mother   CDC VIS was given to parent/legal guardian/designated adult. Risks and benefits of vaccines was discussed and questions answered. Parent/legal guardian/designated adult verbalized understanding.   Child was observed after administration and no side effects noted.            Assessment   1. Well child visit (V20.2) (Z00.129)    Plan  Immunizations    · DTaP-IPV (Kinrix); INJECT 0.5  ML Intramuscular; To Be Done: 24Jan2017   ·  MMR; INJECT 0.5  ML Subcutaneous; To Be Done: 24Jan2017   · Varicella; INJECT 0.5  ML Subcutaneous; To Be Done: 24Jan2017    Signatures   Electronically signed by : DIMAS Rivas; Jan 24 2017  3:12PM CST    Electronically signed by : DOREEN MCKEON M.D.; Jan 24 2017  4:05PM CST       Patient is a 77y old  Female who presents with a chief complaint of afib (25 Jan 2023 12:37)    HPI:  77-year-old female past medical history of CVA, aphasic at baseline cannot provide detailed history, seizure prophylaxis (on phenytoin and Keppra), high blood pressure presents with generalized weakness over the past 4 days associated with shortness of breath and lower extremity bilateral worsening edema.  Per family at bedside on Tuesday approximately 4 days ago they went to check up on the patient as she was having nonbloody diarrhea and noticed she was short of breath spoke to the primary care doctor that they followed up with today and had an EKG done that showed new onset atrial fibrillation and was sent to the emergency department.   . (06 Jan 2023 21:00)    PAST MEDICAL & SURGICAL HISTORY:  Hypertension  Aphasia due to acute cerebrovascular accident (CVA)    HOSPITAL DAY #20    patient seen and examined independently on morning rounds, chart reviewed and discussed with the medicine resident and on interdisciplinary rounds.    brief period of hypotension yesterday (patient changed to entresto but also received losartan)---bp better today  auth obtained for [facility] today    -d/w sister in law bedside as well as son via facetime call bedside  confirmed with family patient was on dilantin 300 mg qhs, keppra 500 mg bid and citalopram 20 mg qam    pcp- Dr. Ruiz        PE:  GEN-NAD,oob in chair- comfortable  PULM-  fair air entry  CVS- +s1/s2 RRR   GI- soft NT ND +bs, no rebound, no guarding  EXT- no edema           Patient is a 77y old  Female who presents with a chief complaint of afib (25 Jan 2023 12:37)    HPI:  77-year-old female past medical history of CVA, aphasic at baseline cannot provide detailed history, seizure prophylaxis (on phenytoin and Keppra), high blood pressure presents with generalized weakness over the past 4 days associated with shortness of breath and lower extremity bilateral worsening edema.  Per family at bedside on Tuesday approximately 4 days ago they went to check up on the patient as she was having nonbloody diarrhea and noticed she was short of breath spoke to the primary care doctor that they followed up with today and had an EKG done that showed new onset atrial fibrillation and was sent to the emergency department.   . (06 Jan 2023 21:00)    PAST MEDICAL & SURGICAL HISTORY:  Hypertension  Aphasia due to acute cerebrovascular accident (CVA)    HOSPITAL DAY #20    patient seen and examined independently on morning rounds, chart reviewed and discussed with the medicine resident and on interdisciplinary rounds.    brief period of hypotension yesterday (patient changed to entresto but also received losartan)---bp better today  auth obtained for [facility] today    -d/w sister in law bedside as well as son via facetime call bedside  confirmed with family patient was on dilantin 300 mg ER qhs, keppra 500 mg bid and citalopram 20 mg qam prior to admission    pcp- Dr. Ruiz        PE:  GEN-NAD,oob in chair- comfortable  PULM-  fair air entry  CVS- +s1/s2 RRR   GI- soft NT ND +bs, no rebound, no guarding  EXT- no edema        Vital Signs Last 24 Hrs  T(C): 36.5 (26 Jan 2023 04:48), Max: 36.6 (25 Jan 2023 13:44)  T(F): 97.7 (26 Jan 2023 04:48), Max: 97.9 (25 Jan 2023 13:44)  HR: 88 (26 Jan 2023 04:48) (66 - 88)  BP: 111/59 (26 Jan 2023 04:48) (84/56 - 111/59)  BP(mean): --  RR: 19 (26 Jan 2023 04:48) (18 - 19)  SpO2: 97% (26 Jan 2023 04:48) (96% - 98%)    Parameters below as of 26 Jan 2023 04:48  Patient On (Oxygen Delivery Method): room air

## 2023-01-26 NOTE — PROGRESS NOTE ADULT - ASSESSMENT
a/p:  77F PMHx CVA, seizure disorder, HTN here with shortness of breath.    #Acute HFrEF (possible tachycardia induced cardiomyopathy)  #AF (new) with RVR --now rate controlled  #h/o CVA with expressive aphasia  -started on noac (eliquis 5 mg bid)  -rate controlled with bblocker (metoprolol 50 mg bid)  -will need repeat echo done as outpatient   -pcp- Dr Henriquez  -restart statin 40 mg and asa 81 mg (unclear why being held 2/2 h/o prior CVA)  -dc losartan and initiated entresto 24/26 mg bid  -f/u with cardiology as outaptient  -ECHO- biatrial enlargement  -CT cardiac neg for clot in RYANN--s/p cardioversion 1/17     #Seizure disorder  -Continue Keppra 500 bid and phenytoin 100 tid  -seizure precautions  -monitor levels    DVT/GI ppx  guarded prognosis    FULL CODE    #Progress Note Handoff:  Family discussion: with her sister in law bedside-- update about discharge--pending auth for STR ([facility])    Total time spent to complete patient's bedside assessment, review medical chart, discuss medical plan of care with covering medical team was more than 35 minutes  with >50% of time spendt face to face with patient, discussion with patient/family and/or coordination of care a/p:      time spendt on discharge >30 minutes including coordination of discharge care    discharge to [facility] today- plan for f/u with outpatient pcp (dr. Ruiz) after dc from [facility]- family may consider moving patient to [place] and will transfer her medical care there at that time     77F PMHx CVA, seizure disorder, HTN here with shortness of breath.    #Acute HFrEF (possible tachycardia induced cardiomyopathy)  #AF (new) with RVR --now rate controlled  #h/o CVA with expressive aphasia  -continue noac (eliquis 5 mg bid)  -rate controlled with bblocker (metoprolol 50 mg bid)  -will need repeat echo done as outpatient   -pcp- Dr Henriquez  -restarted statin 40 mg and asa 81 mg (unclear why was being held 2/2 h/o prior CVA)  -dc losartan and initiated entresto 24/26 mg bid yesterday  -f/u with cardiology as outaptient  -ECHO- biatrial enlargement  -CT cardiac neg for clot in RYANN--s/p cardioversion 1/17     #Seizure disorder  -continue dilantin 100 mg tid and keppra 500 mg bid and celexa 20 mg daily in am  -seizure precautions  -monitor levels    DVT/GI ppx  guarded prognosis    FULL CODE    #Progress Note Handoff:    DISCHARGE to [facility] today---auth obtained-

## 2023-01-30 DIAGNOSIS — I69.320 APHASIA FOLLOWING CEREBRAL INFARCTION: ICD-10-CM

## 2023-01-30 DIAGNOSIS — I50.21 ACUTE SYSTOLIC (CONGESTIVE) HEART FAILURE: ICD-10-CM

## 2023-01-30 DIAGNOSIS — Z20.822 CONTACT WITH AND (SUSPECTED) EXPOSURE TO COVID-19: ICD-10-CM

## 2023-01-30 DIAGNOSIS — T50.0X5A ADVERSE EFFECT OF MINERALOCORTICOIDS AND THEIR ANTAGONISTS, INITIAL ENCOUNTER: ICD-10-CM

## 2023-01-30 DIAGNOSIS — R60.0 LOCALIZED EDEMA: ICD-10-CM

## 2023-01-30 DIAGNOSIS — R30.0 DYSURIA: ICD-10-CM

## 2023-01-30 DIAGNOSIS — I95.2 HYPOTENSION DUE TO DRUGS: ICD-10-CM

## 2023-01-30 DIAGNOSIS — G40.909 EPILEPSY, UNSPECIFIED, NOT INTRACTABLE, WITHOUT STATUS EPILEPTICUS: ICD-10-CM

## 2023-01-30 DIAGNOSIS — I11.0 HYPERTENSIVE HEART DISEASE WITH HEART FAILURE: ICD-10-CM

## 2023-01-30 DIAGNOSIS — T50.1X5A ADVERSE EFFECT OF LOOP [HIGH-CEILING] DIURETICS, INITIAL ENCOUNTER: ICD-10-CM

## 2023-01-30 DIAGNOSIS — F41.9 ANXIETY DISORDER, UNSPECIFIED: ICD-10-CM

## 2023-01-30 DIAGNOSIS — I08.3 COMBINED RHEUMATIC DISORDERS OF MITRAL, AORTIC AND TRICUSPID VALVES: ICD-10-CM

## 2023-01-30 DIAGNOSIS — Z53.8 PROCEDURE AND TREATMENT NOT CARRIED OUT FOR OTHER REASONS: ICD-10-CM

## 2023-01-30 DIAGNOSIS — I42.9 CARDIOMYOPATHY, UNSPECIFIED: ICD-10-CM

## 2023-01-30 DIAGNOSIS — T46.5X5A ADVERSE EFFECT OF OTHER ANTIHYPERTENSIVE DRUGS, INITIAL ENCOUNTER: ICD-10-CM
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
## 2023-01-30 DIAGNOSIS — I27.20 PULMONARY HYPERTENSION, UNSPECIFIED: ICD-10-CM

## 2023-01-30 DIAGNOSIS — I48.0 PAROXYSMAL ATRIAL FIBRILLATION: ICD-10-CM

## 2023-01-30 DIAGNOSIS — F32.A DEPRESSION, UNSPECIFIED: ICD-10-CM

## 2023-02-09 ENCOUNTER — INPATIENT (INPATIENT)
Facility: HOSPITAL | Age: 78
LOS: 7 days | Discharge: NURSING, REHAB OR LONG-TERM CARE | DRG: 291 | End: 2023-02-17
Attending: INTERNAL MEDICINE | Admitting: INTERNAL MEDICINE
Payer: MEDICARE

## 2023-02-09 VITALS
SYSTOLIC BLOOD PRESSURE: 142 MMHG | RESPIRATION RATE: 17 BRPM | OXYGEN SATURATION: 93 % | HEART RATE: 104 BPM | DIASTOLIC BLOOD PRESSURE: 72 MMHG

## 2023-02-09 DIAGNOSIS — I48.0 PAROXYSMAL ATRIAL FIBRILLATION: ICD-10-CM

## 2023-02-09 PROBLEM — I63.9 CEREBRAL INFARCTION, UNSPECIFIED: Chronic | Status: ACTIVE | Noted: 2023-01-06

## 2023-02-09 PROBLEM — I10 ESSENTIAL (PRIMARY) HYPERTENSION: Chronic | Status: ACTIVE | Noted: 2023-01-06

## 2023-02-09 LAB
ALBUMIN SERPL ELPH-MCNC: 3.5 G/DL — SIGNIFICANT CHANGE UP (ref 3.5–5.2)
ALP SERPL-CCNC: 230 U/L — HIGH (ref 30–115)
ALT FLD-CCNC: 75 U/L — HIGH (ref 0–41)
ANION GAP SERPL CALC-SCNC: 11 MMOL/L — SIGNIFICANT CHANGE UP (ref 7–14)
APPEARANCE UR: CLEAR — SIGNIFICANT CHANGE UP
APTT BLD: 31.4 SEC — SIGNIFICANT CHANGE UP (ref 27–39.2)
AST SERPL-CCNC: 76 U/L — HIGH (ref 0–41)
BACTERIA # UR AUTO: NEGATIVE — SIGNIFICANT CHANGE UP
BASE EXCESS BLDV CALC-SCNC: 2.8 MMOL/L — SIGNIFICANT CHANGE UP (ref -2–3)
BASOPHILS # BLD AUTO: 0.03 K/UL — SIGNIFICANT CHANGE UP (ref 0–0.2)
BASOPHILS NFR BLD AUTO: 0.3 % — SIGNIFICANT CHANGE UP (ref 0–1)
BILIRUB SERPL-MCNC: 1.2 MG/DL — SIGNIFICANT CHANGE UP (ref 0.2–1.2)
BILIRUB UR-MCNC: ABNORMAL
BUN SERPL-MCNC: 24 MG/DL — HIGH (ref 10–20)
CA-I SERPL-SCNC: 1.16 MMOL/L — SIGNIFICANT CHANGE UP (ref 1.15–1.33)
CALCIUM SERPL-MCNC: 8.7 MG/DL — SIGNIFICANT CHANGE UP (ref 8.4–10.5)
CHLORIDE SERPL-SCNC: 100 MMOL/L — SIGNIFICANT CHANGE UP (ref 98–110)
CO2 SERPL-SCNC: 24 MMOL/L — SIGNIFICANT CHANGE UP (ref 17–32)
COLOR SPEC: YELLOW — SIGNIFICANT CHANGE UP
CREAT SERPL-MCNC: 1.1 MG/DL — SIGNIFICANT CHANGE UP (ref 0.7–1.5)
CRP SERPL-MCNC: 72 MG/L — HIGH
D DIMER BLD IA.RAPID-MCNC: 356 NG/ML DDU — HIGH
DIFF PNL FLD: ABNORMAL
EGFR: 52 ML/MIN/1.73M2 — LOW
EOSINOPHIL # BLD AUTO: 0.27 K/UL — SIGNIFICANT CHANGE UP (ref 0–0.7)
EOSINOPHIL NFR BLD AUTO: 3 % — SIGNIFICANT CHANGE UP (ref 0–8)
EPI CELLS # UR: 4 /HPF — SIGNIFICANT CHANGE UP (ref 0–5)
GAS PNL BLDV: 131 MMOL/L — LOW (ref 136–145)
GAS PNL BLDV: SIGNIFICANT CHANGE UP
GLUCOSE SERPL-MCNC: 136 MG/DL — HIGH (ref 70–99)
GLUCOSE UR QL: NEGATIVE — SIGNIFICANT CHANGE UP
HCO3 BLDV-SCNC: 28 MMOL/L — SIGNIFICANT CHANGE UP (ref 22–29)
HCT VFR BLD CALC: 38 % — SIGNIFICANT CHANGE UP (ref 37–47)
HCT VFR BLDA CALC: 60 % — CRITICAL HIGH (ref 39–51)
HGB BLD CALC-MCNC: 19.9 G/DL — CRITICAL HIGH (ref 12.6–17.4)
HGB BLD-MCNC: 12.6 G/DL — SIGNIFICANT CHANGE UP (ref 12–16)
HYALINE CASTS # UR AUTO: 5 /LPF — SIGNIFICANT CHANGE UP (ref 0–7)
IMM GRANULOCYTES NFR BLD AUTO: 0.6 % — HIGH (ref 0.1–0.3)
INR BLD: 1.26 RATIO — SIGNIFICANT CHANGE UP (ref 0.65–1.3)
KETONES UR-MCNC: NEGATIVE — SIGNIFICANT CHANGE UP
LACTATE BLDV-MCNC: 2.3 MMOL/L — HIGH (ref 0.5–2)
LACTATE SERPL-SCNC: 1.3 MMOL/L — SIGNIFICANT CHANGE UP (ref 0.7–2)
LEUKOCYTE ESTERASE UR-ACNC: NEGATIVE — SIGNIFICANT CHANGE UP
LYMPHOCYTES # BLD AUTO: 0.47 K/UL — LOW (ref 1.2–3.4)
LYMPHOCYTES # BLD AUTO: 5.3 % — LOW (ref 20.5–51.1)
MAGNESIUM SERPL-MCNC: 1.9 MG/DL — SIGNIFICANT CHANGE UP (ref 1.8–2.4)
MCHC RBC-ENTMCNC: 29 PG — SIGNIFICANT CHANGE UP (ref 27–31)
MCHC RBC-ENTMCNC: 33.2 G/DL — SIGNIFICANT CHANGE UP (ref 32–37)
MCV RBC AUTO: 87.6 FL — SIGNIFICANT CHANGE UP (ref 81–99)
MONOCYTES # BLD AUTO: 0.65 K/UL — HIGH (ref 0.1–0.6)
MONOCYTES NFR BLD AUTO: 7.3 % — SIGNIFICANT CHANGE UP (ref 1.7–9.3)
NEUTROPHILS # BLD AUTO: 7.44 K/UL — HIGH (ref 1.4–6.5)
NEUTROPHILS NFR BLD AUTO: 83.5 % — HIGH (ref 42.2–75.2)
NITRITE UR-MCNC: NEGATIVE — SIGNIFICANT CHANGE UP
NRBC # BLD: 0 /100 WBCS — SIGNIFICANT CHANGE UP (ref 0–0)
NT-PROBNP SERPL-SCNC: HIGH PG/ML (ref 0–300)
PCO2 BLDV: 43 MMHG — HIGH (ref 39–42)
PH BLDV: 7.42 — SIGNIFICANT CHANGE UP (ref 7.32–7.43)
PH UR: 6 — SIGNIFICANT CHANGE UP (ref 5–8)
PLATELET # BLD AUTO: 146 K/UL — SIGNIFICANT CHANGE UP (ref 130–400)
PO2 BLDV: 53 MMHG — SIGNIFICANT CHANGE UP
POTASSIUM BLDV-SCNC: 4 MMOL/L — SIGNIFICANT CHANGE UP (ref 3.5–5.1)
POTASSIUM SERPL-MCNC: 4 MMOL/L — SIGNIFICANT CHANGE UP (ref 3.5–5)
POTASSIUM SERPL-SCNC: 4 MMOL/L — SIGNIFICANT CHANGE UP (ref 3.5–5)
PROT SERPL-MCNC: 5.7 G/DL — LOW (ref 6–8)
PROT UR-MCNC: ABNORMAL
PROTHROM AB SERPL-ACNC: 14.5 SEC — HIGH (ref 9.95–12.87)
RBC # BLD: 4.34 M/UL — SIGNIFICANT CHANGE UP (ref 4.2–5.4)
RBC # FLD: 16 % — HIGH (ref 11.5–14.5)
RBC CASTS # UR COMP ASSIST: 0 /HPF — SIGNIFICANT CHANGE UP (ref 0–4)
SAO2 % BLDV: 85.2 % — SIGNIFICANT CHANGE UP
SARS-COV-2 RNA SPEC QL NAA+PROBE: SIGNIFICANT CHANGE UP
SODIUM SERPL-SCNC: 135 MMOL/L — SIGNIFICANT CHANGE UP (ref 135–146)
SP GR SPEC: 1.02 — SIGNIFICANT CHANGE UP (ref 1.01–1.03)
TROPONIN T SERPL-MCNC: 0.01 NG/ML — SIGNIFICANT CHANGE UP
TROPONIN T SERPL-MCNC: 0.01 NG/ML — SIGNIFICANT CHANGE UP
UROBILINOGEN FLD QL: ABNORMAL
WBC # BLD: 8.91 K/UL — SIGNIFICANT CHANGE UP (ref 4.8–10.8)
WBC # FLD AUTO: 8.91 K/UL — SIGNIFICANT CHANGE UP (ref 4.8–10.8)
WBC UR QL: 4 /HPF — SIGNIFICANT CHANGE UP (ref 0–5)

## 2023-02-09 PROCEDURE — 87899 AGENT NOS ASSAY W/OPTIC: CPT

## 2023-02-09 PROCEDURE — U0005: CPT

## 2023-02-09 PROCEDURE — 36415 COLL VENOUS BLD VENIPUNCTURE: CPT

## 2023-02-09 PROCEDURE — 93005 ELECTROCARDIOGRAM TRACING: CPT

## 2023-02-09 PROCEDURE — 87641 MR-STAPH DNA AMP PROBE: CPT

## 2023-02-09 PROCEDURE — 97110 THERAPEUTIC EXERCISES: CPT | Mod: GP

## 2023-02-09 PROCEDURE — 97116 GAIT TRAINING THERAPY: CPT | Mod: GP

## 2023-02-09 PROCEDURE — 83605 ASSAY OF LACTIC ACID: CPT

## 2023-02-09 PROCEDURE — 87449 NOS EACH ORGANISM AG IA: CPT

## 2023-02-09 PROCEDURE — 93306 TTE W/DOPPLER COMPLETE: CPT

## 2023-02-09 PROCEDURE — 80053 COMPREHEN METABOLIC PANEL: CPT

## 2023-02-09 PROCEDURE — 76705 ECHO EXAM OF ABDOMEN: CPT

## 2023-02-09 PROCEDURE — 86140 C-REACTIVE PROTEIN: CPT

## 2023-02-09 PROCEDURE — 84145 PROCALCITONIN (PCT): CPT

## 2023-02-09 PROCEDURE — 83735 ASSAY OF MAGNESIUM: CPT

## 2023-02-09 PROCEDURE — 85379 FIBRIN DEGRADATION QUANT: CPT

## 2023-02-09 PROCEDURE — 97163 PT EVAL HIGH COMPLEX 45 MIN: CPT | Mod: GP

## 2023-02-09 PROCEDURE — 80061 LIPID PANEL: CPT

## 2023-02-09 PROCEDURE — 71045 X-RAY EXAM CHEST 1 VIEW: CPT

## 2023-02-09 PROCEDURE — 93010 ELECTROCARDIOGRAM REPORT: CPT | Mod: 76

## 2023-02-09 PROCEDURE — 92610 EVALUATE SWALLOWING FUNCTION: CPT | Mod: GN

## 2023-02-09 PROCEDURE — 87640 STAPH A DNA AMP PROBE: CPT

## 2023-02-09 PROCEDURE — U0003: CPT

## 2023-02-09 PROCEDURE — 85025 COMPLETE CBC W/AUTO DIFF WBC: CPT

## 2023-02-09 PROCEDURE — 71045 X-RAY EXAM CHEST 1 VIEW: CPT | Mod: 26

## 2023-02-09 PROCEDURE — 83036 HEMOGLOBIN GLYCOSYLATED A1C: CPT

## 2023-02-09 PROCEDURE — 84484 ASSAY OF TROPONIN QUANT: CPT

## 2023-02-09 PROCEDURE — 82977 ASSAY OF GGT: CPT

## 2023-02-09 PROCEDURE — 82962 GLUCOSE BLOOD TEST: CPT

## 2023-02-09 PROCEDURE — 93970 EXTREMITY STUDY: CPT

## 2023-02-09 RX ORDER — APIXABAN 2.5 MG/1
5 TABLET, FILM COATED ORAL EVERY 12 HOURS
Refills: 0 | Status: DISCONTINUED | OUTPATIENT
Start: 2023-02-09 | End: 2023-02-17

## 2023-02-09 RX ORDER — CITALOPRAM 10 MG/1
1 TABLET, FILM COATED ORAL
Qty: 0 | Refills: 0 | DISCHARGE

## 2023-02-09 RX ORDER — METOPROLOL TARTRATE 50 MG
50 TABLET ORAL
Refills: 0 | Status: DISCONTINUED | OUTPATIENT
Start: 2023-02-09 | End: 2023-02-17

## 2023-02-09 RX ORDER — FUROSEMIDE 40 MG
40 TABLET ORAL ONCE
Refills: 0 | Status: COMPLETED | OUTPATIENT
Start: 2023-02-09 | End: 2023-02-09

## 2023-02-09 RX ORDER — ATORVASTATIN CALCIUM 80 MG/1
40 TABLET, FILM COATED ORAL AT BEDTIME
Refills: 0 | Status: DISCONTINUED | OUTPATIENT
Start: 2023-02-09 | End: 2023-02-17

## 2023-02-09 RX ORDER — ACETAMINOPHEN 500 MG
650 TABLET ORAL ONCE
Refills: 0 | Status: COMPLETED | OUTPATIENT
Start: 2023-02-09 | End: 2023-02-09

## 2023-02-09 RX ORDER — LEVETIRACETAM 250 MG/1
500 TABLET, FILM COATED ORAL
Refills: 0 | Status: DISCONTINUED | OUTPATIENT
Start: 2023-02-09 | End: 2023-02-17

## 2023-02-09 RX ORDER — CEFEPIME 1 G/1
2000 INJECTION, POWDER, FOR SOLUTION INTRAMUSCULAR; INTRAVENOUS ONCE
Refills: 0 | Status: COMPLETED | OUTPATIENT
Start: 2023-02-09 | End: 2023-02-09

## 2023-02-09 RX ORDER — CEFEPIME 1 G/1
1000 INJECTION, POWDER, FOR SOLUTION INTRAMUSCULAR; INTRAVENOUS EVERY 8 HOURS
Refills: 0 | Status: DISCONTINUED | OUTPATIENT
Start: 2023-02-09 | End: 2023-02-13

## 2023-02-09 RX ORDER — ACETAMINOPHEN 500 MG
650 TABLET ORAL EVERY 6 HOURS
Refills: 0 | Status: DISCONTINUED | OUTPATIENT
Start: 2023-02-09 | End: 2023-02-17

## 2023-02-09 RX ORDER — LEVETIRACETAM 250 MG/1
1 TABLET, FILM COATED ORAL
Qty: 0 | Refills: 0 | DISCHARGE

## 2023-02-09 RX ORDER — LANOLIN ALCOHOL/MO/W.PET/CERES
3 CREAM (GRAM) TOPICAL AT BEDTIME
Refills: 0 | Status: DISCONTINUED | OUTPATIENT
Start: 2023-02-09 | End: 2023-02-17

## 2023-02-09 RX ORDER — CITALOPRAM 10 MG/1
20 TABLET, FILM COATED ORAL DAILY
Refills: 0 | Status: DISCONTINUED | OUTPATIENT
Start: 2023-02-09 | End: 2023-02-17

## 2023-02-09 RX ORDER — ASPIRIN/CALCIUM CARB/MAGNESIUM 324 MG
81 TABLET ORAL DAILY
Refills: 0 | Status: DISCONTINUED | OUTPATIENT
Start: 2023-02-09 | End: 2023-02-17

## 2023-02-09 RX ADMIN — CEFEPIME 100 MILLIGRAM(S): 1 INJECTION, POWDER, FOR SOLUTION INTRAMUSCULAR; INTRAVENOUS at 11:53

## 2023-02-09 RX ADMIN — LEVETIRACETAM 500 MILLIGRAM(S): 250 TABLET, FILM COATED ORAL at 23:27

## 2023-02-09 RX ADMIN — ATORVASTATIN CALCIUM 40 MILLIGRAM(S): 80 TABLET, FILM COATED ORAL at 23:27

## 2023-02-09 RX ADMIN — CEFEPIME 100 MILLIGRAM(S): 1 INJECTION, POWDER, FOR SOLUTION INTRAMUSCULAR; INTRAVENOUS at 22:00

## 2023-02-09 RX ADMIN — Medication 650 MILLIGRAM(S): at 11:53

## 2023-02-09 RX ADMIN — Medication 50 MILLIGRAM(S): at 18:28

## 2023-02-09 RX ADMIN — APIXABAN 5 MILLIGRAM(S): 2.5 TABLET, FILM COATED ORAL at 18:27

## 2023-02-09 RX ADMIN — Medication 40 MILLIGRAM(S): at 18:27

## 2023-02-09 RX ADMIN — Medication 100 MILLIGRAM(S): at 23:27

## 2023-02-09 NOTE — ED PROVIDER NOTE - CARE PLAN
Principal Discharge DX:	Paroxysmal atrial fibrillation  Secondary Diagnosis:	Febrile  Secondary Diagnosis:	Bilateral pulmonary infiltrates on CXR   1

## 2023-02-09 NOTE — H&P ADULT - ASSESSMENT
77-year-old female past medical history of CVA, aphasic at baseline cannot provide detailed history, seizure prophylaxis (on phenytoin and Keppra), HTN, Afib s/p DCCV on eliquis, HFrEF (EF 28%) presented due to SOB and diaphoresis    #HF exacerbation   #Fever  - Patient Ef of 28% on last echo 1/23  - Has fever on admission and SOB, opacities and Pleural effusion on CXR, likely PNA  - F/u blood cultures  - Extended RVP panel ordered  - procal, CRP ordered  - Nasal MRSA, legionella, strep ag in urine ordered  - Was given cefepime, and levofloxacin in ED, will c/w cefepime for now  - BNP 97332 (was 5000), will give lasix 40 mg IV once   - F/U urine output, and redose lasix as needed  - DASH diet, fluid restriction  - D-dimer ordered  - Will hold off on repeating echo (was done one month ago only)  - C/w metoprolol, hold entersto as patient's BP is soft  - C/s speech for swallowing assessment    #CVA  - c/w aspirin    #P Afib  - c/w metoprolol and eliquis    #HTN  #HLD  - keep off enteresto for now  - c/w atorvastatin    #Seizure history  - c/w phenytoin and keppra    Diet: DASH  Activity: with assistance  DVT ppx: eliquis  GI ppx: none  Dispo: upgraded to tele       77-year-old female past medical history of CVA, aphasic at baseline cannot provide detailed history, seizure prophylaxis (on phenytoin and Keppra), HTN, Afib s/p DCCV on eliquis, HFrEF (EF 28%) presented due to SOB and diaphoresis    #HF exacerbation   #Fever  - Patient EF of 28% on last echo 1/23  - Has fever on admission and SOB, opacities and Pleural effusion on CXR, likely PNA  - F/u blood cultures  - Extended RVP panel ordered  - procal, CRP ordered  - Nasal MRSA, legionella, strep ag in urine ordered  - Was given cefepime, and levofloxacin in ED, will c/w cefepime for now  - BNP 98049 (was 5000), will give lasix 40 mg IV once   - F/U urine output, and redose lasix as needed, target negative balance  - DASH diet, fluid restriction  - D-dimer ordered  - Will hold off on repeating echo (was done one month ago only)  - C/w metoprolol, hold entresto as patient's BP is soft  - C/s speech for swallowing assessment    #CVA  - c/w aspirin    #P Afib  - c/w metoprolol and eliquis    #HTN  #HLD  - keep off enteresto for now  - c/w atorvastatin    #Seizure history  - c/w phenytoin and keppra  - check AED levels    Diet: DASH  Activity: with assistance  DVT ppx: eliquis  GI ppx: none  Dispo: upgraded to tele    Dr. Carlo Delacruz  Attending Hospitalist

## 2023-02-09 NOTE — H&P ADULT - NSHPLABSRESULTS_GEN_ALL_CORE
Complete Blood Count + Automated Diff (02.09.23 @ 09:24)   WBC Count: 8.91 K/uL   RBC Count: 4.34 M/uL   Hemoglobin: 12.6 g/dL   Hematocrit: 38.0 %   Mean Cell Volume: 87.6 fL   Mean Cell Hemoglobin: 29.0 pg   Mean Cell Hemoglobin Conc: 33.2 g/dL   Red Cell Distrib Width: 16.0 %   Platelet Count - Automated: 146 K/uL   Auto Neutrophil #: 7.44 K/uL   Auto Lymphocyte #: 0.47 K/uL   Auto Monocyte #: 0.65 K/uL   Auto Eosinophil #: 0.27 K/uL   Auto Basophil #: 0.03 K/uL   Auto Neutrophil %: 83.5: Differential percentages must be correlated with absolute numbers for   clinical significance. %   Auto Lymphocyte %: 5.3 %   Auto Monocyte %: 7.3 %   Auto Eosinophil %: 3.0 %   Auto Basophil %: 0.3 %   Auto Immature Granulocyte %: 0.6: (Includes meta, myelo and promyelocytes). Mild elevations in immature Comprehensive Metabolic Panel (02.09.23 @ 09:24)   Sodium, Serum: 135 mmol/L   Potassium, Serum: 4.0 mmol/L   Chloride, Serum: 100 mmol/L   Carbon Dioxide, Serum: 24 mmol/L   Anion Gap, Serum: 11 mmol/L   Blood Urea Nitrogen, Serum: 24 mg/dL   Creatinine, Serum: 1.1 mg/dL   Glucose, Serum: 136 mg/dL   Calcium, Total Serum: 8.7 mg/dL   Protein Total, Serum: 5.7 g/dL   Albumin, Serum: 3.5 g/dL   Bilirubin Total, Serum: 1.2 mg/dL   Alkaline Phosphatase, Serum: 230 U/L   Aspartate Aminotransferase (AST/SGOT): 76 U/L   Alanine Aminotransferase (ALT/SGPT): 75 U/L   eGFR: 52: The estimated glomerular filtration rate (eGFR) is calculated using the   2021 CKD-EPI creatinine equation, which does not have a coefficient for   race and is validated in individuals 18 years of age and older (N Engl J   Med 2021; 385:8480-9434). Creatinine-based eGFR may be inaccurate in   various situations including but not limited to extremes of muscle mass,   altered dietary protein intake, or medications that affect renal tubular   creatinine secretion. mL/min/1.73m2 Troponin T, Serum: 0.01 ng/mL (02.09.23 @ 09:24) Serum Pro-Brain Natriuretic Peptide: 03896 pg/mL (02.09.23 @ 09:24) < from: Xray Chest 1 View- PORTABLE-Urgent (02.09.23 @ 08:44) >    Impression:    Stable right greater than left basilar opacity/effusion. No pneumothorax    < end of copied text >

## 2023-02-09 NOTE — ED ADULT NURSE REASSESSMENT NOTE - NS ED NURSE REASSESS COMMENT FT1
Assumed care of pt; A&O4, Neg SOB at this time. Pt resting on stretcher, no s/s of distress. Pt denies any needs at this time. Pt pending MED bed placement(not a orona candidate). Pt safety and comfort measures in place. Will continue to monitor at this time.

## 2023-02-09 NOTE — ED ADULT TRIAGE NOTE - CHIEF COMPLAINT QUOTE
pt BIBA from [facility]'s NH for c/o shortness of breath as per staff. Pt denies SOB in triage.

## 2023-02-09 NOTE — ED ADULT NURSE NOTE - CHIEF COMPLAINT QUOTE
pt BIBA from [facility] NH for c/o shortness of breath as per staff. Pt denies SOB in triage.

## 2023-02-09 NOTE — H&P ADULT - NSHPPHYSICALEXAM_GEN_ALL_CORE
GA: ao x2  Heart: soft systolic murmur  lungs: decreased air sounds on right lung  Abdomen: soft non tender  LE: edema +2 non pitting

## 2023-02-09 NOTE — H&P ADULT - HISTORY OF PRESENT ILLNESS
77-year-old female past medical history of CVA, aphasic at baseline cannot provide detailed history, seizure prophylaxis (on phenytoin and Keppra), HTN, Afib s/p DCCV on eliquis, HFrEF (EF 28%) [facility] with concerns of shortness of breath shallow breathing nausea and sweating.  Per nursing home nurse and patient's brother Krzysztof and sister-in-law Bev patient developed the symptoms overnight at approximately 4:00 in the morning.  Patient had an O2 sat between 88 to 90% and improved with nasal cannula. Patient denies any chest pain or shortness of breath. She denies any fever or chills. No weakness, headache or dizziness. She denies any palpitations. She was aox2 responding pleasantly to the questions. She also denies any urinary symptoms.  Of note, the patient was admitted one month ago for SOB and was found to have HFrEF and AFib to which she got cardioverted back to sinus rhythm.  In the ED, patient was found to be febrile 100.6, BP soft SBP 95. Lab work was remarkable for midly elevated transaminitis, Trop of 0.01, BNP 30K, EKG NSR with APCs, CXR with right pleural effusion and possible opacities.

## 2023-02-09 NOTE — H&P ADULT - NSICDXPASTMEDICALHX_GEN_ALL_CORE_FT
PAST MEDICAL HISTORY:  Aphasia due to acute cerebrovascular accident (CVA)     HF (heart failure)     Hypertension     Paroxysmal atrial fibrillation

## 2023-02-09 NOTE — ED PROVIDER NOTE - OBJECTIVE STATEMENT
77-year-old female with past medical history of cerebral hemorrhage status post aphasia at baseline chronic kidney disease dementia seizures hypertension and A-fib on Eliquis presents to the emergency department from [facility] with concerns of shortness of breath shallow breathing nausea and sweating.  Per nursing home nurse and patient's brother Krzysztof and sister-in-law Bev patient developed the symptoms overnight at approximately 4:00 in the morning.  Patient had an O2 sat between 88 to 90% and improved with nasal cannula.  In the ED patient is comfortable with no signs of increased work of breathing on room air.  Patient has no complaints and is unsure why she was brought to the emergency department.  Denies recent fevers chills chest pain nausea vomiting diarrhea abdominal pain. 77-year-old female with past medical history of cerebral hemorrhage status post aphasia at baseline chronic kidney disease dementia seizures hypertension and A-fib on Eliquis (recent onset s/p cardioversion) presents to the emergency department from [facility] with concerns of shortness of breath shallow breathing nausea and sweating.  Per nursing home nurse and patient's brother Krzysztof and sister-in-law Bev patient developed the symptoms overnight at approximately 4:00 in the morning.  Patient had an O2 sat between 88 to 90% and improved with nasal cannula.  In the ED patient is comfortable with no signs of increased work of breathing on room air.  Patient has no complaints and is unsure why she was brought to the emergency department.  Denies recent fevers chills chest pain nausea vomiting diarrhea abdominal pain.

## 2023-02-09 NOTE — H&P ADULT - ATTENDING COMMENTS
patient seen and examined independently on morning rounds, chart reviewed and discussed with medicine resident and agree with the above overnight  H/P and assessment and plan with the following addendum:     in brief, 78 yo woman with h/o htn, AF s/p dccv, HFrEF (EF 28%), seizure disorder with h/o CVA who p/w sob and hypoxia at [facility] (where patient had been since her discharge from [facility] on 1/26/23 (after 20+ day hospitalization).  in the ED patient was febrile 100.6 and admitted to tele for acute on chronic HFrEF exacerbation.  during prior hospitalization antihypertensive and diuretic medications were held 2/2 hypotension and slowly restarted prior to dc as well as to be reassess as outpatient.    ROS- as per above otherwise review of systems unremarkable    PE:  GEN-NAD, pleasant, aaox2  PULM- fair air entry, decreased bs bilatearl bases (R>L)  CVS- +s1/s2 RRR +RITU  GI- soft NT ND +bs, no rebound, no guarding  EXT-2+  edema    labs/radiology reviewed    a/p:   #Acute hypoxic respiratory failure 2/2 Acute on chronic HFrEF with ? pneumonia  #PAF s/p DCCV  -cont tele monitoring  -iv lasix----40 mg daily  -monitor i/o, daily weights and fluid restriction  -daily cxr  -will repeat echo---had echo done 1 month ago   -cardiology f/u  -heart failure team consult   -check RVP   -started on IV cefepime--f/u bcx and monitor wbc and fever curve  -cont asa, statin, entresto, eliquis 5 mg bid, metoprolol 50 mg bid  -PT eval (patient would like to return to [facility] once stable for dc)    #h/o CVA   #?h/o seizure  -continue asa, statin and eliquis 5 mg bid for stroke prevention for AF  -cont keppra and dilantin    DVT/GI ppx  guarded prognosis    FULL CODE

## 2023-02-09 NOTE — H&P ADULT - TIME BILLING
Total time spent to complete patient's bedside assessment, review medical chart, discuss medical plan of care with covering medical team was more than 75 minutes  with >50% of time spendt face to face with patient, discussion with patient/family and/or coordination of care

## 2023-02-09 NOTE — ED PROVIDER NOTE - PHYSICAL EXAMINATION
VITAL SIGNS: I have reviewed nursing notes and confirm.  CONSTITUTIONAL: non-toxic, well appearing  SKIN: no rash, no petechiae.  EYES: PERRL, pink conjunctiva, anicteric  ENT: tongue midline, no exudates, MMM  NECK: Supple; no meningismus  CARD: RRR, no murmurs, equal radial pulses bilaterally 2+  RESP: CTAB, no respiratory distress, no increased WOB, comfortable on RA  ABD: Soft, non-tender, non-distended  NEURO: + Alert, orientedx2 to name and location, but not to time, able to answer questions but confused at times, no focal deficits, sensation intact, no facial droop

## 2023-02-09 NOTE — ED PROVIDER NOTE - CLINICAL SUMMARY MEDICAL DECISION MAKING FREE TEXT BOX
77-year-old female past medical history as documented recent admission for new onset A-fib status post JOSE C/cardioversion sent to the ED from SNF with shortness of breath.  Patient with bilateral opacities on chest x-ray.  Patient with hypotension and tachycardia and fever.  Patient with sepsis and sepsis treatment initiated, likely secondary to pneumonia.  IV antibiotics given.  IV fluids restricted due to CHF.  BP improved to the ED with treatment.  Patient admitted to medicine.

## 2023-02-09 NOTE — ED PROVIDER NOTE - ATTENDING CONTRIBUTION TO CARE
I personally evaluated the patient. I reviewed the Resident’s or Physician Assistant’s note (as assigned above), and agree with the findings and plan except as documented in my note.   77-year-old female past medical history significant for hypertension, seizure disorder, CVA, dementia onset CHF and new onset atrial fibrillation, patient underwent JOSE C cardioversion on 1/17 and was discharged home in normal sinus rhythm on Eliquis, sent to the ED from SNF with an episode of shortness of breath and diaphoresis overnight.  Patient is a poor historian due to dementia.  SNF notes reviewed.  As per patient, no complaints at this time.  No shortness of breath, chest pain, palpitations, dizziness.  No nausea, diaphoresis, abdominal pain.  Vitals noted.  CONSTITUTIONAL: Well-appearing; well-nourished; in no apparent distress.   HEAD: Normocephalic; atraumatic.   EYES: PERRL; EOM intact. Conjunctiva normal B/L.   ENT: Normal pharynx with no tonsillar hypertrophy. MMM.  NECK: Supple; non-tender; no cervical lymphadenopathy.   CHEST: Normal chest excursion with respiration.   CARDIOVASCULAR: Normal S1, S2; no murmurs, rubs, or gallops.   RESPIRATORY: Normal chest excursion with respiration; breath sounds clear and equal bilaterally; no wheezes, rhonchi, or rales.  GI/: Normal bowel sounds; non-distended; non-tender.  BACK: No evidence of trauma or deformity. Non-tender to palpation. No CVA tenderness.   EXT: Normal ROM in all four extremities; non-tender to palpation; distal pulses are normal. No leg edema B/L.   SKIN: Normal for age and race; warm; dry; good turgor.  NEURO: A & oriented to person only.; CN 2-12 intact. Grossly unremarkable.

## 2023-02-10 LAB
A1C WITH ESTIMATED AVERAGE GLUCOSE RESULT: 5.5 % — SIGNIFICANT CHANGE UP (ref 4–5.6)
ALBUMIN SERPL ELPH-MCNC: 3.3 G/DL — LOW (ref 3.5–5.2)
ALP SERPL-CCNC: 199 U/L — HIGH (ref 30–115)
ALT FLD-CCNC: 62 U/L — HIGH (ref 0–41)
ANION GAP SERPL CALC-SCNC: 9 MMOL/L — SIGNIFICANT CHANGE UP (ref 7–14)
AST SERPL-CCNC: 49 U/L — HIGH (ref 0–41)
BASOPHILS # BLD AUTO: 0.03 K/UL — SIGNIFICANT CHANGE UP (ref 0–0.2)
BASOPHILS NFR BLD AUTO: 0.4 % — SIGNIFICANT CHANGE UP (ref 0–1)
BILIRUB SERPL-MCNC: 0.9 MG/DL — SIGNIFICANT CHANGE UP (ref 0.2–1.2)
BUN SERPL-MCNC: 22 MG/DL — HIGH (ref 10–20)
CALCIUM SERPL-MCNC: 8.7 MG/DL — SIGNIFICANT CHANGE UP (ref 8.4–10.4)
CHLORIDE SERPL-SCNC: 100 MMOL/L — SIGNIFICANT CHANGE UP (ref 98–110)
CHOLEST SERPL-MCNC: 163 MG/DL — SIGNIFICANT CHANGE UP
CO2 SERPL-SCNC: 29 MMOL/L — SIGNIFICANT CHANGE UP (ref 17–32)
CREAT SERPL-MCNC: 1.1 MG/DL — SIGNIFICANT CHANGE UP (ref 0.7–1.5)
EGFR: 52 ML/MIN/1.73M2 — LOW
EOSINOPHIL # BLD AUTO: 0.93 K/UL — HIGH (ref 0–0.7)
EOSINOPHIL NFR BLD AUTO: 12.8 % — HIGH (ref 0–8)
ESTIMATED AVERAGE GLUCOSE: 111 MG/DL — SIGNIFICANT CHANGE UP (ref 68–114)
GLUCOSE SERPL-MCNC: 94 MG/DL — SIGNIFICANT CHANGE UP (ref 70–99)
HCT VFR BLD CALC: 40.2 % — SIGNIFICANT CHANGE UP (ref 37–47)
HDLC SERPL-MCNC: 97 MG/DL — SIGNIFICANT CHANGE UP
HGB BLD-MCNC: 13.3 G/DL — SIGNIFICANT CHANGE UP (ref 12–16)
IMM GRANULOCYTES NFR BLD AUTO: 0.4 % — HIGH (ref 0.1–0.3)
LIPID PNL WITH DIRECT LDL SERPL: 52 MG/DL — SIGNIFICANT CHANGE UP
LYMPHOCYTES # BLD AUTO: 0.99 K/UL — LOW (ref 1.2–3.4)
LYMPHOCYTES # BLD AUTO: 13.6 % — LOW (ref 20.5–51.1)
MCHC RBC-ENTMCNC: 28.9 PG — SIGNIFICANT CHANGE UP (ref 27–31)
MCHC RBC-ENTMCNC: 33.1 G/DL — SIGNIFICANT CHANGE UP (ref 32–37)
MCV RBC AUTO: 87.4 FL — SIGNIFICANT CHANGE UP (ref 81–99)
MONOCYTES # BLD AUTO: 0.45 K/UL — SIGNIFICANT CHANGE UP (ref 0.1–0.6)
MONOCYTES NFR BLD AUTO: 6.2 % — SIGNIFICANT CHANGE UP (ref 1.7–9.3)
MRSA PCR RESULT.: NEGATIVE — SIGNIFICANT CHANGE UP
NEUTROPHILS # BLD AUTO: 4.85 K/UL — SIGNIFICANT CHANGE UP (ref 1.4–6.5)
NEUTROPHILS NFR BLD AUTO: 66.6 % — SIGNIFICANT CHANGE UP (ref 42.2–75.2)
NON HDL CHOLESTEROL: 66 MG/DL — SIGNIFICANT CHANGE UP
NRBC # BLD: 0 /100 WBCS — SIGNIFICANT CHANGE UP (ref 0–0)
PLATELET # BLD AUTO: 129 K/UL — LOW (ref 130–400)
POTASSIUM SERPL-MCNC: 3.3 MMOL/L — LOW (ref 3.5–5)
POTASSIUM SERPL-SCNC: 3.3 MMOL/L — LOW (ref 3.5–5)
PROCALCITONIN SERPL-MCNC: 3.11 NG/ML — HIGH (ref 0.02–0.1)
PROT SERPL-MCNC: 5.9 G/DL — LOW (ref 6–8)
RBC # BLD: 4.6 M/UL — SIGNIFICANT CHANGE UP (ref 4.2–5.4)
RBC # FLD: 15.8 % — HIGH (ref 11.5–14.5)
SODIUM SERPL-SCNC: 138 MMOL/L — SIGNIFICANT CHANGE UP (ref 135–146)
TRIGL SERPL-MCNC: 72 MG/DL — SIGNIFICANT CHANGE UP
TROPONIN T SERPL-MCNC: <0.01 NG/ML — SIGNIFICANT CHANGE UP
WBC # BLD: 7.28 K/UL — SIGNIFICANT CHANGE UP (ref 4.8–10.8)
WBC # FLD AUTO: 7.28 K/UL — SIGNIFICANT CHANGE UP (ref 4.8–10.8)

## 2023-02-10 PROCEDURE — 99223 1ST HOSP IP/OBS HIGH 75: CPT

## 2023-02-10 RX ORDER — POTASSIUM CHLORIDE 20 MEQ
20 PACKET (EA) ORAL
Refills: 0 | Status: COMPLETED | OUTPATIENT
Start: 2023-02-10 | End: 2023-02-10

## 2023-02-10 RX ORDER — FUROSEMIDE 40 MG
40 TABLET ORAL DAILY
Refills: 0 | Status: DISCONTINUED | OUTPATIENT
Start: 2023-02-10 | End: 2023-02-12

## 2023-02-10 RX ADMIN — CEFEPIME 100 MILLIGRAM(S): 1 INJECTION, POWDER, FOR SOLUTION INTRAMUSCULAR; INTRAVENOUS at 21:59

## 2023-02-10 RX ADMIN — Medication 81 MILLIGRAM(S): at 11:37

## 2023-02-10 RX ADMIN — Medication 50 MILLIEQUIVALENT(S): at 11:09

## 2023-02-10 RX ADMIN — APIXABAN 5 MILLIGRAM(S): 2.5 TABLET, FILM COATED ORAL at 06:13

## 2023-02-10 RX ADMIN — Medication 50 MILLIGRAM(S): at 06:13

## 2023-02-10 RX ADMIN — Medication 40 MILLIGRAM(S): at 10:34

## 2023-02-10 RX ADMIN — LEVETIRACETAM 500 MILLIGRAM(S): 250 TABLET, FILM COATED ORAL at 17:02

## 2023-02-10 RX ADMIN — Medication 50 MILLIEQUIVALENT(S): at 15:41

## 2023-02-10 RX ADMIN — Medication 100 MILLIGRAM(S): at 06:13

## 2023-02-10 RX ADMIN — Medication 100 MILLIGRAM(S): at 21:58

## 2023-02-10 RX ADMIN — CEFEPIME 100 MILLIGRAM(S): 1 INJECTION, POWDER, FOR SOLUTION INTRAMUSCULAR; INTRAVENOUS at 15:06

## 2023-02-10 RX ADMIN — CEFEPIME 100 MILLIGRAM(S): 1 INJECTION, POWDER, FOR SOLUTION INTRAMUSCULAR; INTRAVENOUS at 06:13

## 2023-02-10 RX ADMIN — Medication 100 MILLIGRAM(S): at 15:56

## 2023-02-10 RX ADMIN — APIXABAN 5 MILLIGRAM(S): 2.5 TABLET, FILM COATED ORAL at 17:02

## 2023-02-10 RX ADMIN — LEVETIRACETAM 500 MILLIGRAM(S): 250 TABLET, FILM COATED ORAL at 06:13

## 2023-02-10 RX ADMIN — CITALOPRAM 20 MILLIGRAM(S): 10 TABLET, FILM COATED ORAL at 11:37

## 2023-02-10 RX ADMIN — Medication 50 MILLIGRAM(S): at 17:02

## 2023-02-10 RX ADMIN — Medication 50 MILLIEQUIVALENT(S): at 12:57

## 2023-02-10 RX ADMIN — ATORVASTATIN CALCIUM 40 MILLIGRAM(S): 80 TABLET, FILM COATED ORAL at 21:58

## 2023-02-10 NOTE — SWALLOW BEDSIDE ASSESSMENT ADULT - SLP PERTINENT HISTORY OF CURRENT PROBLEM
77-year-old female past medical history of CVA, aphasic at baseline cannot provide detailed history, seizure prophylaxis (on phenytoin and Keppra), HTN, Afib s/p DCCV on eliquis, HFrEF (EF 28%) [facility] nursing home with concerns of shortness of breath shallow breathing nausea and sweating.  Per nursing home nurse and patient's brother Krzysztof and sister-in-law Bev patient developed the symptoms overnight at approximately 4:00 in the morning.  Patient had an O2 sat between 88 to 90% and improved with nasal cannula. Patient denies any chest pain or shortness of breath. She denies any fever or chills. No weakness, headache or dizziness. She denies any palpitations. She was aox2 responding pleasantly to the questions. She also denies any urinary symptoms.

## 2023-02-10 NOTE — CONSULT NOTE ADULT - SUBJECTIVE AND OBJECTIVE BOX
HPI:  77-year-old female past medical history of CVA, aphasic at baseline cannot provide detailed history, seizure prophylaxis (on phenytoin and Keppra), HTN, Afib s/p DCCV on eliquis, HFrEF (EF 28%) [facility] nursing home with concerns of shortness of breath shallow breathing nausea and sweating.  Per nursing home nurse and patient's brother Krzysztof and sister-in-law Bev patient developed the symptoms overnight at approximately 4:00 in the morning.  Patient had an O2 sat between 88 to 90% and improved with nasal cannula. Patient denies any chest pain or shortness of breath. She denies any fever or chills. No weakness, headache or dizziness. She denies any palpitations. She was aox2 responding pleasantly to the questions. She also denies any urinary symptoms.  Of note, the patient was admitted one month ago for SOB and was found to have HFrEF and AFib to which she got cardioverted back to sinus rhythm.  In the ED, patient was found to be febrile 100.6, BP soft SBP 95. Lab work was remarkable for midly elevated transaminitis, Trop of 0.01, BNP 30K, EKG NSR with APCs, CXR with right pleural effusion and possible opacities.    (09 Feb 2023 17:41)      PAST MEDICAL & SURGICAL HISTORY  Hypertension    Aphasia due to acute cerebrovascular accident (CVA)    Paroxysmal atrial fibrillation    HF (heart failure)    FAMILY HISTORY:  FAMILY HISTORY:    [ ] no pertinent family history of premature cardiovascular disease in first degree relatives.  Mother:   Father:   Siblings:     SOCIAL HISTORY:  []smoker  []Alcohol  []Drug    ALLERGIES:  No Known Allergies      MEDICATIONS:  MEDICATIONS  (STANDING):  apixaban 5 milliGRAM(s) Oral every 12 hours  aspirin enteric coated 81 milliGRAM(s) Oral daily  atorvastatin 40 milliGRAM(s) Oral at bedtime  cefepime   IVPB 1000 milliGRAM(s) IV Intermittent every 8 hours  citalopram 20 milliGRAM(s) Oral daily  furosemide   Injectable 40 milliGRAM(s) IV Push daily  levETIRAcetam 500 milliGRAM(s) Oral two times a day  metoprolol tartrate 50 milliGRAM(s) Oral two times a day  phenytoin   Capsule 100 milliGRAM(s) Oral three times a day    MEDICATIONS  (PRN):  acetaminophen     Tablet .. 650 milliGRAM(s) Oral every 6 hours PRN Temp greater or equal to 38C (100.4F), Mild Pain (1 - 3)  melatonin 3 milliGRAM(s) Oral at bedtime PRN Insomnia      HOME MEDICATIONS:  Home Medications:  apixaban 5 mg oral tablet: 1 tab(s) orally every 12 hours (09 Feb 2023 17:39)  aspirin 81 mg oral delayed release tablet: 1 tab(s) orally once a day (09 Feb 2023 17:39)  citalopram 20 mg oral tablet: 1 tab(s) orally once a day (09 Feb 2023 17:39)  Keppra 500 mg oral tablet: 1 tab(s) orally 2 times a day (09 Feb 2023 17:39)  metoprolol tartrate 50 mg oral tablet: 1 tab(s) orally 2 times a day (09 Feb 2023 17:39)  nystatin 100,000 units/g topical cream: 1 application topically 2 times a day (09 Feb 2023 17:39)  phenytoin 100 mg oral capsule: 1 tab(s) orally 3 times a day (09 Feb 2023 17:39)      VITALS:   T(F): 98.2 (02-10 @ 15:26), Max: 101.1 (02-09 @ 10:00)  HR: 97 (02-10 @ 15:26) (61 - 104)  BP: 124/78 (02-10 @ 15:26) (95/51 - 144/64)  BP(mean): 92 (02-10 @ 08:09) (92 - 92)  RR: 18 (02-10 @ 15:26) (17 - 18)  SpO2: 97% (02-10 @ 15:26) (93% - 98%)    I&O's Summary      REVIEW OF SYSTEMS:    Negative except as mentioned in HPI    PHYSICAL EXAM:  NEURO: Awake  GEN: Not in acute distress  NECK: No JVD  LUNGS: course bilaterally   CARDIOVASCULAR:+ S1/S2   ABD: Soft, non-tender, non-distended, +BS  EXT: No ROBERT  SKIN: Intact    LABS:                        13.3   7.28  )-----------( 129      ( 10 Feb 2023 06:01 )             40.2     02-10    138  |  100  |  22<H>  ----------------------------<  94  3.3<L>   |  29  |  1.1    Ca    8.7      10 Feb 2023 06:01  Mg     1.9     02-09    TPro  5.9<L>  /  Alb  3.3<L>  /  TBili  0.9  /  DBili  x   /  AST  49<H>  /  ALT  62<H>  /  AlkPhos  199<H>  02-10    PT/INR - ( 09 Feb 2023 09:24 )   PT: 14.50 sec;   INR: 1.26 ratio        PTT - ( 09 Feb 2023 09:24 )  PTT:31.4 sec  Troponin T, Serum: <0.01 ng/mL (02-10-23 @ 06:01)  Troponin T, Serum: 0.01 ng/mL (02-09-23 @ 21:33)    CARDIAC MARKERS ( 10 Feb 2023 06:01 )  x     / <0.01 ng/mL / x     / x     / x      CARDIAC MARKERS ( 09 Feb 2023 21:33 )  x     / 0.01 ng/mL / x     / x     / x      CARDIAC MARKERS ( 09 Feb 2023 09:24 )  x     / 0.01 ng/mL / x     / x     / x          Serum Pro-Brain Natriuretic Peptide: 96327 pg/mL (02-09-23 @ 09:24)    02-10 Chol 163 LDL -- HDL 97 Trig 72, 01-07 Chol 158 LDL -- HDL 72 Trig 71    RADIOLOGY:  -CXR:  < from: Xray Chest 1 View- PORTABLE-Urgent (02.09.23 @ 08:44) >  Impression:    Stable right greater than left basilar opacity/effusion. No pneumothorax    < end of copied text >    -TTE:  < from: TTE Echo Complete w/ Contrast w/ Doppler (01.08.23 @ 09:44) >  Summary:   1. LV Ejection Fraction by Barr's Method with a biplane EF of 28 %.   2. Severely decreased global left ventricular systolic function.   3. Endocardial visualization was enhanced with intravenous echo contrast.   4. No evidence of LV thrombus.   5. Moderately enlarged right ventricle.   6. Severely reduced RV systolic function.   7. The mitral valve leaflets are tethered which is due to reduced   systolic function and elevated LVDP.   8. Moderate to severe mitral valve regurgitation.   9. Mild to moderate aortic regurgitation.  10. Mild tricuspid regurgitation.  11. Estimated pulmonary artery systolic pressure is 53.7 mmHg assuming a   right atrial pressure of 15 mmHg, which is consistent with moderate   pulmonary hypertension.  12. There is mild aortic root calcification.  13. Severely enlarged left atrium.  14. Severely enlarged right atrium.  15. Small pericardial effusion.    < end of copied text >    -CCTA:    -STRESS TEST:    -CATHETERIZATION:      ECG:  < from: 12 Lead ECG (02.09.23 @ 18:08) >  Ventricular Rate 69 BPM    Atrial Rate 69 BPM    P-R Interval 192 ms    QRS Duration 102 ms    Q-T Interval 468 ms    QTC Calculation(Bazett) 501 ms    P Axis 29 degrees    R Axis 128 degrees    T Axis -24 degrees    Diagnosis Line Sinus rhythm with Premature atrial complexes  Right axis deviation  Incomplete right bundle branch block  Possible Right ventricular hypertrophy  Cannot rule out Anterior infarct , age undetermined  Abnormal ECG    < end of copied text >      TELEMETRY EVENTS: sinus

## 2023-02-10 NOTE — CONSULT NOTE ADULT - ATTENDING COMMENTS
adhf with reduced ef  ? superimposed pna  GDMR for chf/cm - bb/entresto  change lasix to po once euvolemic  ab as per prim team  recall prn

## 2023-02-10 NOTE — SWALLOW BEDSIDE ASSESSMENT ADULT - NS SPL SWALLOW CLINIC TRIAL FT
Toleration of puree, regular solids, and thin liquids w/o overt s/s of penetration/aspiration. Cannot r/o silent aspiration at the bedside.

## 2023-02-10 NOTE — SWALLOW BEDSIDE ASSESSMENT ADULT - COMMENTS
Of note, the patient was admitted one month ago for SOB and was found to have HFrEF and AFib to which she got cardioverted back to sinus rhythm.  In the ED, patient was found to be febrile 100.6, BP soft SBP 95. Lab work was remarkable for mildly elevated transaminitis, Trop of 0.01, BNP 30K, EKG NSR with APCs, CXR with right pleural effusion and possible opacities.

## 2023-02-11 LAB
ALBUMIN SERPL ELPH-MCNC: 3.6 G/DL — SIGNIFICANT CHANGE UP (ref 3.5–5.2)
ALP SERPL-CCNC: 257 U/L — HIGH (ref 30–115)
ALT FLD-CCNC: 59 U/L — HIGH (ref 0–41)
ANION GAP SERPL CALC-SCNC: 10 MMOL/L — SIGNIFICANT CHANGE UP (ref 7–14)
AST SERPL-CCNC: 44 U/L — HIGH (ref 0–41)
BASOPHILS # BLD AUTO: 0.04 K/UL — SIGNIFICANT CHANGE UP (ref 0–0.2)
BASOPHILS NFR BLD AUTO: 0.6 % — SIGNIFICANT CHANGE UP (ref 0–1)
BILIRUB SERPL-MCNC: 1 MG/DL — SIGNIFICANT CHANGE UP (ref 0.2–1.2)
BUN SERPL-MCNC: 16 MG/DL — SIGNIFICANT CHANGE UP (ref 10–20)
CALCIUM SERPL-MCNC: 8.9 MG/DL — SIGNIFICANT CHANGE UP (ref 8.4–10.4)
CHLORIDE SERPL-SCNC: 96 MMOL/L — LOW (ref 98–110)
CO2 SERPL-SCNC: 32 MMOL/L — SIGNIFICANT CHANGE UP (ref 17–32)
CREAT SERPL-MCNC: 1.1 MG/DL — SIGNIFICANT CHANGE UP (ref 0.7–1.5)
EGFR: 52 ML/MIN/1.73M2 — LOW
EOSINOPHIL # BLD AUTO: 1.19 K/UL — HIGH (ref 0–0.7)
EOSINOPHIL NFR BLD AUTO: 17.3 % — HIGH (ref 0–8)
GLUCOSE SERPL-MCNC: 97 MG/DL — SIGNIFICANT CHANGE UP (ref 70–99)
HCT VFR BLD CALC: 41.8 % — SIGNIFICANT CHANGE UP (ref 37–47)
HGB BLD-MCNC: 13.9 G/DL — SIGNIFICANT CHANGE UP (ref 12–16)
IMM GRANULOCYTES NFR BLD AUTO: 0.3 % — SIGNIFICANT CHANGE UP (ref 0.1–0.3)
LEGIONELLA AG UR QL: NEGATIVE — SIGNIFICANT CHANGE UP
LYMPHOCYTES # BLD AUTO: 0.74 K/UL — LOW (ref 1.2–3.4)
LYMPHOCYTES # BLD AUTO: 10.7 % — LOW (ref 20.5–51.1)
MAGNESIUM SERPL-MCNC: 1.9 MG/DL — SIGNIFICANT CHANGE UP (ref 1.8–2.4)
MCHC RBC-ENTMCNC: 28.6 PG — SIGNIFICANT CHANGE UP (ref 27–31)
MCHC RBC-ENTMCNC: 33.3 G/DL — SIGNIFICANT CHANGE UP (ref 32–37)
MCV RBC AUTO: 86 FL — SIGNIFICANT CHANGE UP (ref 81–99)
MONOCYTES # BLD AUTO: 0.36 K/UL — SIGNIFICANT CHANGE UP (ref 0.1–0.6)
MONOCYTES NFR BLD AUTO: 5.2 % — SIGNIFICANT CHANGE UP (ref 1.7–9.3)
NEUTROPHILS # BLD AUTO: 4.54 K/UL — SIGNIFICANT CHANGE UP (ref 1.4–6.5)
NEUTROPHILS NFR BLD AUTO: 65.9 % — SIGNIFICANT CHANGE UP (ref 42.2–75.2)
NRBC # BLD: 0 /100 WBCS — SIGNIFICANT CHANGE UP (ref 0–0)
PLATELET # BLD AUTO: 142 K/UL — SIGNIFICANT CHANGE UP (ref 130–400)
POTASSIUM SERPL-MCNC: 3.6 MMOL/L — SIGNIFICANT CHANGE UP (ref 3.5–5)
POTASSIUM SERPL-SCNC: 3.6 MMOL/L — SIGNIFICANT CHANGE UP (ref 3.5–5)
PROT SERPL-MCNC: 6.5 G/DL — SIGNIFICANT CHANGE UP (ref 6–8)
RBC # BLD: 4.86 M/UL — SIGNIFICANT CHANGE UP (ref 4.2–5.4)
RBC # FLD: 15.9 % — HIGH (ref 11.5–14.5)
SODIUM SERPL-SCNC: 138 MMOL/L — SIGNIFICANT CHANGE UP (ref 135–146)
WBC # BLD: 6.89 K/UL — SIGNIFICANT CHANGE UP (ref 4.8–10.8)
WBC # FLD AUTO: 6.89 K/UL — SIGNIFICANT CHANGE UP (ref 4.8–10.8)

## 2023-02-11 PROCEDURE — 93306 TTE W/DOPPLER COMPLETE: CPT | Mod: 26

## 2023-02-11 PROCEDURE — 99222 1ST HOSP IP/OBS MODERATE 55: CPT

## 2023-02-11 PROCEDURE — 99233 SBSQ HOSP IP/OBS HIGH 50: CPT

## 2023-02-11 PROCEDURE — 71045 X-RAY EXAM CHEST 1 VIEW: CPT | Mod: 26

## 2023-02-11 RX ADMIN — APIXABAN 5 MILLIGRAM(S): 2.5 TABLET, FILM COATED ORAL at 18:01

## 2023-02-11 RX ADMIN — LEVETIRACETAM 500 MILLIGRAM(S): 250 TABLET, FILM COATED ORAL at 18:02

## 2023-02-11 RX ADMIN — Medication 40 MILLIGRAM(S): at 05:30

## 2023-02-11 RX ADMIN — Medication 50 MILLIGRAM(S): at 05:29

## 2023-02-11 RX ADMIN — ATORVASTATIN CALCIUM 40 MILLIGRAM(S): 80 TABLET, FILM COATED ORAL at 22:09

## 2023-02-11 RX ADMIN — LEVETIRACETAM 500 MILLIGRAM(S): 250 TABLET, FILM COATED ORAL at 05:29

## 2023-02-11 RX ADMIN — CEFEPIME 100 MILLIGRAM(S): 1 INJECTION, POWDER, FOR SOLUTION INTRAMUSCULAR; INTRAVENOUS at 22:09

## 2023-02-11 RX ADMIN — CEFEPIME 100 MILLIGRAM(S): 1 INJECTION, POWDER, FOR SOLUTION INTRAMUSCULAR; INTRAVENOUS at 05:29

## 2023-02-11 RX ADMIN — APIXABAN 5 MILLIGRAM(S): 2.5 TABLET, FILM COATED ORAL at 05:29

## 2023-02-11 RX ADMIN — Medication 100 MILLIGRAM(S): at 05:29

## 2023-02-11 RX ADMIN — Medication 100 MILLIGRAM(S): at 13:39

## 2023-02-11 RX ADMIN — Medication 100 MILLIGRAM(S): at 22:09

## 2023-02-11 RX ADMIN — CITALOPRAM 20 MILLIGRAM(S): 10 TABLET, FILM COATED ORAL at 11:29

## 2023-02-11 RX ADMIN — Medication 81 MILLIGRAM(S): at 11:29

## 2023-02-11 RX ADMIN — CEFEPIME 100 MILLIGRAM(S): 1 INJECTION, POWDER, FOR SOLUTION INTRAMUSCULAR; INTRAVENOUS at 13:44

## 2023-02-11 RX ADMIN — Medication 50 MILLIGRAM(S): at 18:01

## 2023-02-11 NOTE — PATIENT PROFILE ADULT - FALL HARM RISK - HARM RISK INTERVENTIONS

## 2023-02-11 NOTE — PHYSICAL THERAPY INITIAL EVALUATION ADULT - GENERAL OBSERVATIONS, REHAB EVAL
Pt encountered in semi-park position in bed, A & O x 2 in NAD, +tele, +IV lock, no c/o pain and agreeable with PT. Pt requires Min A in bed mobility, Min A transfer mobility and ambulated 40 ft Min A using RW with dec step-length/dec posture/minimal balance instability. Pt will benefit from skilled PT 3-5x/wk for thera ex, functional mobility, balance and gait training.

## 2023-02-11 NOTE — PROGRESS NOTE ADULT - SUBJECTIVE AND OBJECTIVE BOX
Patient is a 77y old  Female who presents with a chief complaint of Shortness of breath (10 Feb 2023 19:26)    HPI:  77-year-old female past medical history of CVA, aphasic at baseline cannot provide detailed history, seizure prophylaxis (on phenytoin and Keppra), HTN, Afib s/p DCCV on eliquis, HFrEF (EF 28%) [facility] nursing home with concerns of shortness of breath shallow breathing nausea and sweating.  Per nursing home nurse and patient's brother Krzysztof and sister-in-law Bev patient developed the symptoms overnight at approximately 4:00 in the morning.  Patient had an O2 sat between 88 to 90% and improved with nasal cannula. Patient denies any chest pain or shortness of breath. She denies any fever or chills. No weakness, headache or dizziness. She denies any palpitations. She was aox2 responding pleasantly to the questions. She also denies any urinary symptoms.  Of note, the patient was admitted one month ago for SOB and was found to have HFrEF and AFib to which she got cardioverted back to sinus rhythm.  In the ED, patient was found to be febrile 100.6, BP soft SBP 95. Lab work was remarkable for midly elevated transaminitis, Trop of 0.01, BNP 30K, EKG NSR with APCs, CXR with right pleural effusion and possible opacities.    (09 Feb 2023 17:41)    PAST MEDICAL & SURGICAL HISTORY:  Hypertension  Aphasia due to acute cerebrovascular accident (CVA)  Paroxysmal atrial fibrillation  HF (heart failure)    patient seen and examined independently on morning rounds, chart reviewed and discussed with the medicine resident and on interdisciplinary rounds.    no overnight events--daughter in law bedside- improving sob and no chest pain- family wants patient to be dc to [place] STR (eventual goal is for JO[facility] vs long term NH in [place])      PE:  GEN-NAD, pleasant, aaox2-3  PULM- fair air entry, decreased bs bilatearl bases (R>L), trace rales  CVS- +s1/s2 RRR +RITU  GI- soft NT ND +bs, no rebound, no guarding  EXT-2+  edema    labs/radiology reviewed                          13.9   6.89  )-----------( 142      ( 11 Feb 2023 06:48 )             41.8     02-11    138  |  96<L>  |  16  ----------------------------<  97  3.6   |  32  |  1.1    Ca    8.9      11 Feb 2023 06:48  Mg     1.9     02-11    TPro  6.5  /  Alb  3.6  /  TBili  1.0  /  DBili  x   /  AST  44<H>  /  ALT  59<H>  /  AlkPhos  257<H>  02-11    CARDIAC MARKERS ( 10 Feb 2023 06:01 )  x     / <0.01 ng/mL / x     / x     / x      CARDIAC MARKERS ( 09 Feb 2023 21:33 )  x     / 0.01 ng/mL / x     / x     / x              Culture - Blood (collected 09 Feb 2023 12:45)  Source: .Blood Blood  Preliminary Report (10 Feb 2023 23:01):    No growth to date.    Culture - Blood (collected 09 Feb 2023 12:45)  Source: .Blood Blood  Preliminary Report (10 Feb 2023 23:01):    No growth to date.    Culture - Urine (collected 09 Feb 2023 12:20)  Source: Clean Catch Clean Catch (Midstream)  Preliminary Report (11 Feb 2023 02:58):    50,000 - 99,000 CFU/mL Enterococcus species        MEDICATIONS  (STANDING):  apixaban 5 milliGRAM(s) Oral every 12 hours  aspirin enteric coated 81 milliGRAM(s) Oral daily  atorvastatin 40 milliGRAM(s) Oral at bedtime  cefepime   IVPB 1000 milliGRAM(s) IV Intermittent every 8 hours  citalopram 20 milliGRAM(s) Oral daily  furosemide   Injectable 40 milliGRAM(s) IV Push daily  levETIRAcetam 500 milliGRAM(s) Oral two times a day  metoprolol tartrate 50 milliGRAM(s) Oral two times a day  phenytoin   Capsule 100 milliGRAM(s) Oral three times a day

## 2023-02-11 NOTE — PROGRESS NOTE ADULT - ASSESSMENT
A/P:  78 yo woman with h/o htn, AF s/p dccv, HFrEF (EF 28%), seizure disorder with h/o CVA who p/w sob and hypoxia at [facility] (where patient had been since her discharge from [facility] on 1/26/23 (after 20+ day hospitalization).  in the ED patient was febrile 100.6 and admitted to tele for acute on chronic HFrEF exacerbation.  during prior hospitalization antihypertensive and diuretic medications were held 2/2 hypotension and slowly restarted prior to dc as well as to be reassessed as outpatient.      a/p:   #Acute hypoxic respiratory failure 2/2 Acute on chronic HFrEF with ? pneumonia (HCAP)  #PAF s/p DCCV  -cont tele monitoring  -iv lasix----40 mg q12 hr---taper to oral within next 24-48 hrs as tolerates--monitor bmp including K level while on aggressive diuresis   -HF team consult  -monitor i/o, daily weights and fluid restriction  -daily cxr--repeat today  -f/u repeat echo---had echo done 1 month ago and at that time EF 28% (new onset)  -cardiology f/u  -covid negative  -legionella negative  -mrsa neg  -f/u RVP  -cont iv abx (cefepime day #3)- f/u bcx and monitor wbc and fever curve   -procal 3.11 with crp 72  -repeat CXR today   -aspiration precautions--sp/swallow diet modification recomm  -cont asa, statin, entresto, eliquis 5 mg bid, metoprolol 50 mg bid  -PT following (patient would like to return to [facility] once stable for dc but as per family request want [place] [facility]---case management aware)    #h/o CVA   #?h/o seizure  -continue asa, statin and eliquis 5 mg bid for stroke prevention for AF  -cont keppra and dilantin--monitor levels    DVT/GI ppx  guarded prognosis    FULL CODE     #Progress Note Handoff  Pending (specify):  continue IV diuresis and transition to oral as tolerates- cont tele- f/u echo and cxr (both being done today)  Family discussion: d/w daughter in law bedside   Disposition: SNF_x__(family and pt want [place] STR when ready for dc)    Total time spent to complete patient's bedside assessment, review medical chart, discuss medical plan of care with covering medical team was more than 50 minutes  with >50% of time spendt face to face with patient, discussion with patient/family and/or coordination of care

## 2023-02-11 NOTE — PHYSICAL THERAPY INITIAL EVALUATION ADULT - PERTINENT HX OF CURRENT PROBLEM, REHAB EVAL
77-year-old female past medical history of CVA, aphasic at baseline cannot provide detailed history, seizure prophylaxis (on phenytoin and Keppra), HTN, Afib s/p DCCV on eliquis, HFrEF (EF 28%) presented due to SOB and diaphoresis.

## 2023-02-12 LAB
-  AMPICILLIN: SIGNIFICANT CHANGE UP
-  CIPROFLOXACIN: SIGNIFICANT CHANGE UP
-  LEVOFLOXACIN: SIGNIFICANT CHANGE UP
-  NITROFURANTOIN: SIGNIFICANT CHANGE UP
-  TETRACYCLINE: SIGNIFICANT CHANGE UP
-  VANCOMYCIN: SIGNIFICANT CHANGE UP
ACANTHOCYTES BLD QL SMEAR: SIGNIFICANT CHANGE UP
ALBUMIN SERPL ELPH-MCNC: 3.5 G/DL — SIGNIFICANT CHANGE UP (ref 3.5–5.2)
ALP SERPL-CCNC: 303 U/L — HIGH (ref 30–115)
ALT FLD-CCNC: 54 U/L — HIGH (ref 0–41)
ANION GAP SERPL CALC-SCNC: 9 MMOL/L — SIGNIFICANT CHANGE UP (ref 7–14)
ANISOCYTOSIS BLD QL: SIGNIFICANT CHANGE UP
AST SERPL-CCNC: 40 U/L — SIGNIFICANT CHANGE UP (ref 0–41)
BASOPHILS # BLD AUTO: 0 K/UL — SIGNIFICANT CHANGE UP (ref 0–0.2)
BASOPHILS NFR BLD AUTO: 0 % — SIGNIFICANT CHANGE UP (ref 0–1)
BILIRUB SERPL-MCNC: 0.8 MG/DL — SIGNIFICANT CHANGE UP (ref 0.2–1.2)
BUN SERPL-MCNC: 15 MG/DL — SIGNIFICANT CHANGE UP (ref 10–20)
BURR CELLS BLD QL SMEAR: PRESENT — SIGNIFICANT CHANGE UP
CALCIUM SERPL-MCNC: 9.1 MG/DL — SIGNIFICANT CHANGE UP (ref 8.4–10.5)
CHLORIDE SERPL-SCNC: 95 MMOL/L — LOW (ref 98–110)
CO2 SERPL-SCNC: 31 MMOL/L — SIGNIFICANT CHANGE UP (ref 17–32)
CREAT SERPL-MCNC: 1.3 MG/DL — SIGNIFICANT CHANGE UP (ref 0.7–1.5)
CULTURE RESULTS: SIGNIFICANT CHANGE UP
EGFR: 43 ML/MIN/1.73M2 — LOW
ELLIPTOCYTES BLD QL SMEAR: SLIGHT — SIGNIFICANT CHANGE UP
EOSINOPHIL # BLD AUTO: 2.09 K/UL — HIGH (ref 0–0.7)
EOSINOPHIL NFR BLD AUTO: 27.8 % — HIGH (ref 0–8)
GIANT PLATELETS BLD QL SMEAR: PRESENT — SIGNIFICANT CHANGE UP
GLUCOSE SERPL-MCNC: 103 MG/DL — HIGH (ref 70–99)
HCT VFR BLD CALC: 42.8 % — SIGNIFICANT CHANGE UP (ref 37–47)
HGB BLD-MCNC: 14 G/DL — SIGNIFICANT CHANGE UP (ref 12–16)
LYMPHOCYTES # BLD AUTO: 0.78 K/UL — LOW (ref 1.2–3.4)
LYMPHOCYTES # BLD AUTO: 10.4 % — LOW (ref 20.5–51.1)
MACROCYTES BLD QL: SIGNIFICANT CHANGE UP
MAGNESIUM SERPL-MCNC: 1.9 MG/DL — SIGNIFICANT CHANGE UP (ref 1.8–2.4)
MANUAL SMEAR VERIFICATION: SIGNIFICANT CHANGE UP
MCHC RBC-ENTMCNC: 28.7 PG — SIGNIFICANT CHANGE UP (ref 27–31)
MCHC RBC-ENTMCNC: 32.7 G/DL — SIGNIFICANT CHANGE UP (ref 32–37)
MCV RBC AUTO: 87.9 FL — SIGNIFICANT CHANGE UP (ref 81–99)
METHOD TYPE: SIGNIFICANT CHANGE UP
MICROCYTES BLD QL: SLIGHT — SIGNIFICANT CHANGE UP
MONOCYTES # BLD AUTO: 0.33 K/UL — SIGNIFICANT CHANGE UP (ref 0.1–0.6)
MONOCYTES NFR BLD AUTO: 4.4 % — SIGNIFICANT CHANGE UP (ref 1.7–9.3)
NEUTROPHILS # BLD AUTO: 3.98 K/UL — SIGNIFICANT CHANGE UP (ref 1.4–6.5)
NEUTROPHILS NFR BLD AUTO: 51.3 % — SIGNIFICANT CHANGE UP (ref 42.2–75.2)
NEUTS BAND # BLD: 1.7 % — SIGNIFICANT CHANGE UP (ref 0–6)
NRBC # BLD: 2 /100 — HIGH (ref 0–0)
NRBC # BLD: SIGNIFICANT CHANGE UP /100 WBCS (ref 0–0)
ORGANISM # SPEC MICROSCOPIC CNT: SIGNIFICANT CHANGE UP
ORGANISM # SPEC MICROSCOPIC CNT: SIGNIFICANT CHANGE UP
OVALOCYTES BLD QL SMEAR: SLIGHT — SIGNIFICANT CHANGE UP
PLAT MORPH BLD: NORMAL — SIGNIFICANT CHANGE UP
PLATELET # BLD AUTO: 151 K/UL — SIGNIFICANT CHANGE UP (ref 130–400)
POIKILOCYTOSIS BLD QL AUTO: SIGNIFICANT CHANGE UP
POLYCHROMASIA BLD QL SMEAR: SLIGHT — SIGNIFICANT CHANGE UP
POTASSIUM SERPL-MCNC: 3.3 MMOL/L — LOW (ref 3.5–5)
POTASSIUM SERPL-SCNC: 3.3 MMOL/L — LOW (ref 3.5–5)
PROT SERPL-MCNC: 6.1 G/DL — SIGNIFICANT CHANGE UP (ref 6–8)
RBC # BLD: 4.87 M/UL — SIGNIFICANT CHANGE UP (ref 4.2–5.4)
RBC # FLD: 15.7 % — HIGH (ref 11.5–14.5)
RBC BLD AUTO: ABNORMAL
S PNEUM AG UR QL: NEGATIVE — SIGNIFICANT CHANGE UP
SODIUM SERPL-SCNC: 135 MMOL/L — SIGNIFICANT CHANGE UP (ref 135–146)
SPECIMEN SOURCE: SIGNIFICANT CHANGE UP
VARIANT LYMPHS # BLD: 4.4 % — SIGNIFICANT CHANGE UP (ref 0–5)
WBC # BLD: 7.51 K/UL — SIGNIFICANT CHANGE UP (ref 4.8–10.8)
WBC # FLD AUTO: 7.51 K/UL — SIGNIFICANT CHANGE UP (ref 4.8–10.8)

## 2023-02-12 PROCEDURE — 99233 SBSQ HOSP IP/OBS HIGH 50: CPT

## 2023-02-12 RX ORDER — SODIUM CHLORIDE 9 MG/ML
250 INJECTION, SOLUTION INTRAVENOUS ONCE
Refills: 0 | Status: COMPLETED | OUTPATIENT
Start: 2023-02-12 | End: 2023-02-12

## 2023-02-12 RX ORDER — FUROSEMIDE 40 MG
40 TABLET ORAL DAILY
Refills: 0 | Status: DISCONTINUED | OUTPATIENT
Start: 2023-02-13 | End: 2023-02-13

## 2023-02-12 RX ADMIN — Medication 50 MILLIGRAM(S): at 06:29

## 2023-02-12 RX ADMIN — APIXABAN 5 MILLIGRAM(S): 2.5 TABLET, FILM COATED ORAL at 17:53

## 2023-02-12 RX ADMIN — LEVETIRACETAM 500 MILLIGRAM(S): 250 TABLET, FILM COATED ORAL at 06:29

## 2023-02-12 RX ADMIN — SODIUM CHLORIDE 500 MILLILITER(S): 9 INJECTION, SOLUTION INTRAVENOUS at 20:15

## 2023-02-12 RX ADMIN — CEFEPIME 100 MILLIGRAM(S): 1 INJECTION, POWDER, FOR SOLUTION INTRAMUSCULAR; INTRAVENOUS at 13:09

## 2023-02-12 RX ADMIN — Medication 100 MILLIGRAM(S): at 06:29

## 2023-02-12 RX ADMIN — Medication 81 MILLIGRAM(S): at 11:49

## 2023-02-12 RX ADMIN — Medication 40 MILLIGRAM(S): at 06:29

## 2023-02-12 RX ADMIN — ATORVASTATIN CALCIUM 40 MILLIGRAM(S): 80 TABLET, FILM COATED ORAL at 21:37

## 2023-02-12 RX ADMIN — CEFEPIME 100 MILLIGRAM(S): 1 INJECTION, POWDER, FOR SOLUTION INTRAMUSCULAR; INTRAVENOUS at 06:28

## 2023-02-12 RX ADMIN — Medication 100 MILLIGRAM(S): at 21:39

## 2023-02-12 RX ADMIN — APIXABAN 5 MILLIGRAM(S): 2.5 TABLET, FILM COATED ORAL at 06:29

## 2023-02-12 RX ADMIN — Medication 100 MILLIGRAM(S): at 13:06

## 2023-02-12 RX ADMIN — CITALOPRAM 20 MILLIGRAM(S): 10 TABLET, FILM COATED ORAL at 11:49

## 2023-02-12 RX ADMIN — LEVETIRACETAM 500 MILLIGRAM(S): 250 TABLET, FILM COATED ORAL at 17:53

## 2023-02-12 RX ADMIN — CEFEPIME 100 MILLIGRAM(S): 1 INJECTION, POWDER, FOR SOLUTION INTRAMUSCULAR; INTRAVENOUS at 21:37

## 2023-02-12 NOTE — PROGRESS NOTE ADULT - SUBJECTIVE AND OBJECTIVE BOX
CHIEF COMPLAINT:    Patient is a 77y old  Female who presents with a chief complaint of Shortness of breath    INTERVAL HPI/OVERNIGHT EVENTS:    Patient seen and examined at bedside. No acute overnight events occurred.    ROS: Denies SOB, ches tpain. All other systems are negative.    Medications:  Standing  apixaban 5 milliGRAM(s) Oral every 12 hours  aspirin enteric coated 81 milliGRAM(s) Oral daily  atorvastatin 40 milliGRAM(s) Oral at bedtime  cefepime   IVPB 1000 milliGRAM(s) IV Intermittent every 8 hours  citalopram 20 milliGRAM(s) Oral daily  furosemide   Injectable 40 milliGRAM(s) IV Push daily  levETIRAcetam 500 milliGRAM(s) Oral two times a day  metoprolol tartrate 50 milliGRAM(s) Oral two times a day  phenytoin   Capsule 100 milliGRAM(s) Oral three times a day    PRN Meds  acetaminophen     Tablet .. 650 milliGRAM(s) Oral every 6 hours PRN  melatonin 3 milliGRAM(s) Oral at bedtime PRN        Vital Signs:    T(F): 96.8 (02-12-23 @ 05:15), Max: 97.6 (02-11-23 @ 16:34)  HR: 80 (02-12-23 @ 06:39) (77 - 99)  BP: 123/65 (02-12-23 @ 05:15) (111/63 - 130/66)  RR: 18 (02-12-23 @ 06:39) (18 - 19)  SpO2: 98% (02-12-23 @ 06:39) (98% - 98%)  I&O's Summary    11 Feb 2023 07:01  -  12 Feb 2023 07:00  --------------------------------------------------------  IN: 240 mL / OUT: 240 mL / NET: 0 mL        PHYSICAL EXAM:  GENERAL:  NAD  SKIN: No rashes or lesions  HEENT: Atraumatic. Normocephalic. Anicteric  NECK:  No JVD.   PULMONARY: Clear to ausculation bilaterally. No wheezing. No rales  CVS: Normal S1, S2. Regular rate and rhythm. No murmurs.  ABDOMEN/GI: Soft, Nontender, Nondistended; Bowel sounds are present  EXTREMITIES:  No edema B/L LE.  NEUROLOGIC:  No motor deficit.  PSYCH: Alert & oriented x 3, normal affect      LABS:                        14.0   7.51  )-----------( 151      ( 12 Feb 2023 07:02 )             42.8     02-12    135  |  95<L>  |  15  ----------------------------<  103<H>  3.3<L>   |  31  |  1.3    Ca    9.1      12 Feb 2023 07:02  Mg     1.9     02-12    TPro  6.1  /  Alb  3.5  /  TBili  0.8  /  DBili  x   /  AST  40  /  ALT  54<H>  /  AlkPhos  303<H>  02-12      Serum Pro-Brain Natriuretic Peptide: 58661 pg/mL (02-09-23 @ 09:24)    Trop <0.01, CKMB --, CK --, 02-10-23 @ 06:01  Trop 0.01, CKMB --, CK --, 02-09-23 @ 21:33      Culture - Blood (collected 09 Feb 2023 12:45)  Source: .Blood Blood  Preliminary Report (10 Feb 2023 23:01):    No growth to date.    Culture - Blood (collected 09 Feb 2023 12:45)  Source: .Blood Blood  Preliminary Report (10 Feb 2023 23:01):    No growth to date.    Culture - Urine (collected 09 Feb 2023 12:20)  Source: Clean Catch Clean Catch (Midstream)  Preliminary Report (11 Feb 2023 02:58):    50,000 - 99,000 CFU/mL Enterococcus species        RADIOLOGY & ADDITIONAL TESTS:  Imaging or report Personally Reviewed:  [ ] YES  [ ] NO -->no new images    Telemetry reviewed independently - NSR, no acute events  EKG reviewed independently -->no new EKGs    Consultant(s) Notes Reviewed:  [ ] YES  [ ] NO  Care Discussed with Consultants/Other Providers [ ] YES  [ ] NO    Case discussed with resident  Care discussed with pt

## 2023-02-12 NOTE — PROGRESS NOTE ADULT - ASSESSMENT
76 yo F PMHx woman with h/o htn, AF s/p dccv, chronic HFrEF (EF 28%), seizure disorder with h/o CVA who p/w sob and hypoxia at [facility] (where patient had been since her discharge from [facility] on 1/26/23 (after 20+ day hospitalization).  In the ED patient was febrile 100.6 and admitted to [facility] for acute on chronic HFrEF exacerbation.  During prior hospitalization antihypertensive and diuretic medications were held 2/2 hypotension and slowly restarted prior to dc as well as to be reassessed as outpatient.    Acute hypoxic respiratory failure   Acute on chronic HFrEF with suspected pneumonia (HCAP)  - c/w cefepime to complete course day 4  - change IV lasix to PO as pt euveolmic. 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 on 1/26/23 (after 20+ day hospitalization).  In the ED patient was febrile 100.6 and admitted to [facility] for acute on chronic HFrEF exacerbation.  During prior hospitalization antihypertensive and diuretic medications were held 2/2 hypotension and slowly restarted prior to dc as well as to be reassessed as outpatient.    Acute hypoxic respiratory failure   Acute on chronic HFrEF with suspected pneumonia (HCAP)  - c/w cefepime to complete course day 4  - change IV lasix to PO as pt euveolmic. Can dc telemetry  - repeat echo shows improvement in EF from 28:->35-40%  -covid negative  -legionella negative  -mrsa neg  -f/u RVP  -procal 3.11 with crp 72  -aspiration precautions--sp/swallow diet modification recomm    Chronic HFrEF  - likley systolic failure due to RVR  - EF recovering  - cont asa, statin, entresto, eliquis 5 mg bid, metoprolol 50 mg bid    Transaminitis  - unclear etiology  - check ggt  - denie RUQ pain, bilirubin normal    CVA   seizure  -continue asa, statin and eliquis 5 mg bid for stroke prevention for AF  -cont keppra and dilantin--monitor levels    Chronic paroxysm AF  - c/w metoprolol, eliquis    DVT/GI ppx  guarded prognosis    FULL CODE   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

## 2023-02-13 LAB
ALBUMIN SERPL ELPH-MCNC: 3.9 G/DL — SIGNIFICANT CHANGE UP (ref 3.5–5.2)
ALP SERPL-CCNC: 335 U/L — HIGH (ref 30–115)
ALT FLD-CCNC: 54 U/L — HIGH (ref 0–41)
ANION GAP SERPL CALC-SCNC: 12 MMOL/L — SIGNIFICANT CHANGE UP (ref 7–14)
AST SERPL-CCNC: 42 U/L — HIGH (ref 0–41)
BASOPHILS # BLD AUTO: 0.06 K/UL — SIGNIFICANT CHANGE UP (ref 0–0.2)
BASOPHILS NFR BLD AUTO: 0.7 % — SIGNIFICANT CHANGE UP (ref 0–1)
BILIRUB SERPL-MCNC: 0.7 MG/DL — SIGNIFICANT CHANGE UP (ref 0.2–1.2)
BUN SERPL-MCNC: 23 MG/DL — HIGH (ref 10–20)
CALCIUM SERPL-MCNC: 8.9 MG/DL — SIGNIFICANT CHANGE UP (ref 8.4–10.4)
CHLORIDE SERPL-SCNC: 97 MMOL/L — LOW (ref 98–110)
CO2 SERPL-SCNC: 30 MMOL/L — SIGNIFICANT CHANGE UP (ref 17–32)
CREAT SERPL-MCNC: 1.2 MG/DL — SIGNIFICANT CHANGE UP (ref 0.7–1.5)
EGFR: 47 ML/MIN/1.73M2 — LOW
EOSINOPHIL # BLD AUTO: 2.42 K/UL — HIGH (ref 0–0.7)
EOSINOPHIL NFR BLD AUTO: 27.6 % — HIGH (ref 0–8)
GGT SERPL-CCNC: 632 U/L — HIGH (ref 1–40)
GLUCOSE BLDC GLUCOMTR-MCNC: 137 MG/DL — HIGH (ref 70–99)
GLUCOSE SERPL-MCNC: 90 MG/DL — SIGNIFICANT CHANGE UP (ref 70–99)
HCT VFR BLD CALC: 40 % — SIGNIFICANT CHANGE UP (ref 37–47)
HGB BLD-MCNC: 13.3 G/DL — SIGNIFICANT CHANGE UP (ref 12–16)
IMM GRANULOCYTES NFR BLD AUTO: 0.5 % — HIGH (ref 0.1–0.3)
LACTATE SERPL-SCNC: 2 MMOL/L — SIGNIFICANT CHANGE UP (ref 0.7–2)
LYMPHOCYTES # BLD AUTO: 1.27 K/UL — SIGNIFICANT CHANGE UP (ref 1.2–3.4)
LYMPHOCYTES # BLD AUTO: 14.5 % — LOW (ref 20.5–51.1)
MAGNESIUM SERPL-MCNC: 1.9 MG/DL — SIGNIFICANT CHANGE UP (ref 1.8–2.4)
MCHC RBC-ENTMCNC: 28.8 PG — SIGNIFICANT CHANGE UP (ref 27–31)
MCHC RBC-ENTMCNC: 33.3 G/DL — SIGNIFICANT CHANGE UP (ref 32–37)
MCV RBC AUTO: 86.6 FL — SIGNIFICANT CHANGE UP (ref 81–99)
MONOCYTES # BLD AUTO: 0.52 K/UL — SIGNIFICANT CHANGE UP (ref 0.1–0.6)
MONOCYTES NFR BLD AUTO: 5.9 % — SIGNIFICANT CHANGE UP (ref 1.7–9.3)
NEUTROPHILS # BLD AUTO: 4.47 K/UL — SIGNIFICANT CHANGE UP (ref 1.4–6.5)
NEUTROPHILS NFR BLD AUTO: 50.8 % — SIGNIFICANT CHANGE UP (ref 42.2–75.2)
NRBC # BLD: 0 /100 WBCS — SIGNIFICANT CHANGE UP (ref 0–0)
PLATELET # BLD AUTO: 139 K/UL — SIGNIFICANT CHANGE UP (ref 130–400)
POTASSIUM SERPL-MCNC: 3.1 MMOL/L — LOW (ref 3.5–5)
POTASSIUM SERPL-SCNC: 3.1 MMOL/L — LOW (ref 3.5–5)
PROT SERPL-MCNC: 6.3 G/DL — SIGNIFICANT CHANGE UP (ref 6–8)
RBC # BLD: 4.62 M/UL — SIGNIFICANT CHANGE UP (ref 4.2–5.4)
RBC # FLD: 15.9 % — HIGH (ref 11.5–14.5)
SARS-COV-2 RNA SPEC QL NAA+PROBE: SIGNIFICANT CHANGE UP
SODIUM SERPL-SCNC: 139 MMOL/L — SIGNIFICANT CHANGE UP (ref 135–146)
WBC # BLD: 8.78 K/UL — SIGNIFICANT CHANGE UP (ref 4.8–10.8)
WBC # FLD AUTO: 8.78 K/UL — SIGNIFICANT CHANGE UP (ref 4.8–10.8)

## 2023-02-13 PROCEDURE — 99233 SBSQ HOSP IP/OBS HIGH 50: CPT

## 2023-02-13 PROCEDURE — 93970 EXTREMITY STUDY: CPT | Mod: 26

## 2023-02-13 PROCEDURE — 76705 ECHO EXAM OF ABDOMEN: CPT | Mod: 26

## 2023-02-13 RX ORDER — POTASSIUM CHLORIDE 20 MEQ
40 PACKET (EA) ORAL ONCE
Refills: 0 | Status: COMPLETED | OUTPATIENT
Start: 2023-02-13 | End: 2023-02-13

## 2023-02-13 RX ORDER — POTASSIUM CHLORIDE 20 MEQ
20 PACKET (EA) ORAL
Refills: 0 | Status: DISCONTINUED | OUTPATIENT
Start: 2023-02-13 | End: 2023-02-13

## 2023-02-13 RX ORDER — FUROSEMIDE 40 MG
40 TABLET ORAL
Refills: 0 | Status: DISCONTINUED | OUTPATIENT
Start: 2023-02-13 | End: 2023-02-17

## 2023-02-13 RX ADMIN — Medication 100 MILLIGRAM(S): at 14:58

## 2023-02-13 RX ADMIN — APIXABAN 5 MILLIGRAM(S): 2.5 TABLET, FILM COATED ORAL at 05:22

## 2023-02-13 RX ADMIN — Medication 100 MILLIGRAM(S): at 05:23

## 2023-02-13 RX ADMIN — Medication 40 MILLIGRAM(S): at 05:23

## 2023-02-13 RX ADMIN — Medication 50 MILLIEQUIVALENT(S): at 10:13

## 2023-02-13 RX ADMIN — CITALOPRAM 20 MILLIGRAM(S): 10 TABLET, FILM COATED ORAL at 12:52

## 2023-02-13 RX ADMIN — Medication 40 MILLIEQUIVALENT(S): at 12:52

## 2023-02-13 RX ADMIN — Medication 100 MILLIGRAM(S): at 21:21

## 2023-02-13 RX ADMIN — LEVETIRACETAM 500 MILLIGRAM(S): 250 TABLET, FILM COATED ORAL at 17:59

## 2023-02-13 RX ADMIN — Medication 50 MILLIGRAM(S): at 05:23

## 2023-02-13 RX ADMIN — CEFEPIME 100 MILLIGRAM(S): 1 INJECTION, POWDER, FOR SOLUTION INTRAMUSCULAR; INTRAVENOUS at 05:23

## 2023-02-13 RX ADMIN — Medication 40 MILLIGRAM(S): at 14:58

## 2023-02-13 RX ADMIN — ATORVASTATIN CALCIUM 40 MILLIGRAM(S): 80 TABLET, FILM COATED ORAL at 21:22

## 2023-02-13 RX ADMIN — Medication 50 MILLIEQUIVALENT(S): at 09:22

## 2023-02-13 RX ADMIN — Medication 81 MILLIGRAM(S): at 12:52

## 2023-02-13 RX ADMIN — LEVETIRACETAM 500 MILLIGRAM(S): 250 TABLET, FILM COATED ORAL at 05:23

## 2023-02-13 RX ADMIN — Medication 50 MILLIGRAM(S): at 18:01

## 2023-02-13 RX ADMIN — APIXABAN 5 MILLIGRAM(S): 2.5 TABLET, FILM COATED ORAL at 17:59

## 2023-02-13 NOTE — PROGRESS NOTE ADULT - ATTENDING COMMENTS
a/p  HPI:  77-year-old female past medical history of CVA, aphasic at baseline cannot provide detailed history, seizure prophylaxis (on phenytoin and Keppra), HTN, Afib s/p DCCV on eliquis, HFrEF (EF 28%) [facility] with concerns of shortness of breath shallow breathing nausea and sweating.  Per nursing home nurse and patient's brother Krzysztof and sister-in-law Bev patient developed the symptoms overnight at approximately 4:00 in the morning.  Patient had an O2 sat between 88 to 90% and improved with nasal cannula. Patient denies any chest pain or shortness of breath. She denies any fever or chills. No weakness, headache or dizziness. She denies any palpitations. She was aox2 responding pleasantly to the questions. She also denies any urinary symptoms.  Of note, the patient was admitted one month ago for SOB and was found to have HFrEF and AFib to which she got cardioverted back to sinus rhythm.  In the ED, patient was found to be febrile 100.6, BP soft SBP 95. Lab work was remarkable for midly elevated transaminitis, Trop of 0.01, BNP 30K, EKG NSR with APCs, CXR with right pleural effusion and possible opacities.    (09 Feb 2023 17:41)  now Echo shows improved ef    # acute on chronic HFrEF, cont diuretics  # ckd  3 at [facility]   eGFR: 47: The estimated glomerular filtration rate (eGFR) is calculated using the  2021 CKD-EPI creatinine equation, which does not have a coefficient for  race and is validated in individuals 18 years of age and older (N Engl J  Med 2021; 385:0796-8711). Creatinine-based eGFR may be inaccurate in  various situations including but not limited to extremes of muscle mass,  altered dietary protein intake, or medications that affect renal tubular  creatinine secretion. mL/min/1.73m2 (02.13.23 @ 06:10)    creatinine trend  1.2 (02-13-23 @ 06:10)  1.3 (02-12-23 @ 07:02)  1.1 (02-11-23 @ 06:48)  Creatinine, Serum: 1.2 mg/dL (01.06.23 @ 15:19)    monitor cr     # Paroxysmal afib cont ac    # sz do cont meds     #Progress Note Handoff  Pending (specify):  Clinical improvement   Family discussion: dw pt   Disposition: snf

## 2023-02-13 NOTE — PROGRESS NOTE ADULT - SUBJECTIVE AND OBJECTIVE BOX
INTERNAL MEDICINE PROGRESS NOTE  HOUSTON KING 77y Female  MRN#: [id number]     Hospital Day: 4d  Pt is currently admitted with the primary diagnosis of CHF Exacerbation    SUBJECTIVE  Overnight events     Subjective complaints  Patient was evaluated this morning. Reports no SOB< CP, dysuria, or abdominal pain.  Went to Doppler US and obtained RUQ US in AM.  Pending PT courtney.  Spoke with sister in law Bev who reports that family is interested in Rehab.                                           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)    Paroxysmal atrial fibrillation    HF (heart failure)                                                ALLERGIES:  No Known Allergies                           HOME MEDICATIONS  Home Medications:  apixaban 5 mg oral tablet: 1 tab(s) orally every 12 hours (09 Feb 2023 17:39)  aspirin 81 mg oral delayed release tablet: 1 tab(s) orally once a day (09 Feb 2023 17:39)  citalopram 20 mg oral tablet: 1 tab(s) orally once a day (09 Feb 2023 17:39)  Keppra 500 mg oral tablet: 1 tab(s) orally 2 times a day (09 Feb 2023 17:39)  metoprolol tartrate 50 mg oral tablet: 1 tab(s) orally 2 times a day (09 Feb 2023 17:39)  nystatin 100,000 units/g topical cream: 1 application topically 2 times a day (09 Feb 2023 17:39)  phenytoin 100 mg oral capsule: 1 tab(s) orally 3 times a day (09 Feb 2023 17:39)                           MEDICATIONS:  STANDING MEDICATIONS  apixaban 5 milliGRAM(s) Oral every 12 hours  aspirin enteric coated 81 milliGRAM(s) Oral daily  atorvastatin 40 milliGRAM(s) Oral at bedtime  cefepime   IVPB 1000 milliGRAM(s) IV Intermittent every 8 hours  citalopram 20 milliGRAM(s) Oral daily  furosemide    Tablet 40 milliGRAM(s) Oral two times a day  levETIRAcetam 500 milliGRAM(s) Oral two times a day  metoprolol tartrate 50 milliGRAM(s) Oral two times a day  phenytoin   Capsule 100 milliGRAM(s) Oral three times a day    PRN MEDICATIONS  acetaminophen     Tablet .. 650 milliGRAM(s) Oral every 6 hours PRN  melatonin 3 milliGRAM(s) Oral at bedtime PRN                                            ------------------------------------------------------------  VITAL SIGNS: Last 24 Hours  T(C): 36.2 (13 Feb 2023 05:00), Max: 36.9 (12 Feb 2023 13:54)  T(F): 97.1 (13 Feb 2023 05:00), Max: 98.5 (12 Feb 2023 13:54)  HR: 69 (13 Feb 2023 05:00) (69 - 97)  BP: 117/59 (13 Feb 2023 05:00) (82/51 - 117/59)  BP(mean): 60 (12 Feb 2023 18:27) (60 - 60)  RR: 18 (13 Feb 2023 05:00) (18 - 18)  SpO2: 97% (13 Feb 2023 05:00) (96% - 97%)                                               LABS:                        13.3   8.78  )-----------( 139      ( 13 Feb 2023 06:10 )             40.0     02-13    139  |  97<L>  |  23<H>  ----------------------------<  90  3.1<L>   |  30  |  1.2    Ca    8.9      13 Feb 2023 06:10  Mg     1.9     02-13    TPro  6.3  /  Alb  3.9  /  TBili  0.7  /  DBili  x   /  AST  42<H>  /  ALT  54<H>  /  AlkPhos  335<H>  02-13          Lactate, Blood: 2.0 mmol/L (02-13-23 @ 11:18)                                                      RADIOLOGY:    PHYSICAL EXAM:  GENERAL: NAD, lying in bed comfortably on room air  HEAD:  Atraumatic, Normocephalic  EYES: EOMI, PERRLA, conjunctiva and sclera clear  CHEST/LUNG: Clear to auscultation bilaterally; No rales, rhonchi, wheezing, or rubs. Unlabored respirations  HEART: Regular rate and rhythm; No murmurs, rubs, or gallops  ABDOMEN: Bowel Sounds present; Soft, nontender, nondistended  EXTREMITIES:  Nonpitting edema present BL LE. 2+ Peripheral Pulses, brisk capillary refill.  NERVOUS SYSTEM:  A&Ox3, no focal deficits   SKIN: No rashes or lesions                                       ASSESSMENT & PLAN  HOUSTON KING is a 77y Female with a history significant for  htn, AF s/p dccv, chronic HFrEF (EF 28%), seizure disorder with h/o CVA who p/w sob and hypoxia at [facility] (where patient had been since her discharge from [facility] on 1/26/23 (after 20+ day hospitalization).  In the ED patient was febrile 100.6 and admitted to [facility] for acute on chronic HFrEF exacerbation.  During prior hospitalization antihypertensive and diuretic medications were held 2/2 hypotension and slowly restarted prior to dc as well as to be reassessed as outpatient.    Acute hypoxic respiratory failure   Acute on chronic HFrEF with suspected pneumonia (HCAP)  - Discontinued cefepime s/p 5 day course  - change IV lasix to PO as pt euveolmic. Can dc telemetry  - repeat echo shows improvement in EF from 28:->35-40%  -covid negative  -legionella negative  -mrsa neg  -f/u RVP  -procal 3.11 with crp 72  -aspiration precautions--sp/swallow diet modification recomm    Chronic HFrEF  - likley systolic failure due to RVR  - EF recovering  - cont asa, statin, entresto, eliquis 5 mg bid, metoprolol 50 mg bid    Transaminitis  - unclear etiology  - elevated ggt  - denies RUQ pain, bilirubin normal    CVA   seizure  -continue asa, statin and eliquis 5 mg bid for stroke prevention for AF  -cont keppra and dilantin--monitor levels    Chronic paroxysm AF  - c/w metoprolol, eliquis    DVT/GI ppx  guarded prognosis    FULL CODE   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

## 2023-02-14 LAB
ACANTHOCYTES BLD QL SMEAR: SLIGHT — SIGNIFICANT CHANGE UP
ALBUMIN SERPL ELPH-MCNC: 4 G/DL — SIGNIFICANT CHANGE UP (ref 3.5–5.2)
ALP SERPL-CCNC: 405 U/L — HIGH (ref 30–115)
ALT FLD-CCNC: 68 U/L — HIGH (ref 0–41)
ANION GAP SERPL CALC-SCNC: 7 MMOL/L — SIGNIFICANT CHANGE UP (ref 7–14)
ANISOCYTOSIS BLD QL: SLIGHT — SIGNIFICANT CHANGE UP
AST SERPL-CCNC: 68 U/L — HIGH (ref 0–41)
BASOPHILS # BLD AUTO: 0.17 K/UL — SIGNIFICANT CHANGE UP (ref 0–0.2)
BASOPHILS NFR BLD AUTO: 1.7 % — HIGH (ref 0–1)
BILIRUB SERPL-MCNC: 0.7 MG/DL — SIGNIFICANT CHANGE UP (ref 0.2–1.2)
BUN SERPL-MCNC: 20 MG/DL — SIGNIFICANT CHANGE UP (ref 10–20)
BURR CELLS BLD QL SMEAR: PRESENT — SIGNIFICANT CHANGE UP
CALCIUM SERPL-MCNC: 9.2 MG/DL — SIGNIFICANT CHANGE UP (ref 8.4–10.4)
CHLORIDE SERPL-SCNC: 96 MMOL/L — LOW (ref 98–110)
CO2 SERPL-SCNC: 36 MMOL/L — HIGH (ref 17–32)
CREAT SERPL-MCNC: 1.2 MG/DL — SIGNIFICANT CHANGE UP (ref 0.7–1.5)
CULTURE RESULTS: SIGNIFICANT CHANGE UP
CULTURE RESULTS: SIGNIFICANT CHANGE UP
EGFR: 47 ML/MIN/1.73M2 — LOW
EOSINOPHIL # BLD AUTO: 3.59 K/UL — HIGH (ref 0–0.7)
EOSINOPHIL NFR BLD AUTO: 35.7 % — HIGH (ref 0–8)
GIANT PLATELETS BLD QL SMEAR: PRESENT — SIGNIFICANT CHANGE UP
GLUCOSE SERPL-MCNC: 102 MG/DL — HIGH (ref 70–99)
HCT VFR BLD CALC: 42.7 % — SIGNIFICANT CHANGE UP (ref 37–47)
HGB BLD-MCNC: 13.8 G/DL — SIGNIFICANT CHANGE UP (ref 12–16)
LYMPHOCYTES # BLD AUTO: 0.78 K/UL — LOW (ref 1.2–3.4)
LYMPHOCYTES # BLD AUTO: 7.8 % — LOW (ref 20.5–51.1)
MAGNESIUM SERPL-MCNC: 2 MG/DL — SIGNIFICANT CHANGE UP (ref 1.8–2.4)
MANUAL SMEAR VERIFICATION: SIGNIFICANT CHANGE UP
MCHC RBC-ENTMCNC: 28.3 PG — SIGNIFICANT CHANGE UP (ref 27–31)
MCHC RBC-ENTMCNC: 32.3 G/DL — SIGNIFICANT CHANGE UP (ref 32–37)
MCV RBC AUTO: 87.5 FL — SIGNIFICANT CHANGE UP (ref 81–99)
MONOCYTES # BLD AUTO: 0.43 K/UL — SIGNIFICANT CHANGE UP (ref 0.1–0.6)
MONOCYTES NFR BLD AUTO: 4.3 % — SIGNIFICANT CHANGE UP (ref 1.7–9.3)
NEUTROPHILS # BLD AUTO: 4.98 K/UL — SIGNIFICANT CHANGE UP (ref 1.4–6.5)
NEUTROPHILS NFR BLD AUTO: 49.6 % — SIGNIFICANT CHANGE UP (ref 42.2–75.2)
OVALOCYTES BLD QL SMEAR: SIGNIFICANT CHANGE UP
PLAT MORPH BLD: NORMAL — SIGNIFICANT CHANGE UP
PLATELET # BLD AUTO: 164 K/UL — SIGNIFICANT CHANGE UP (ref 130–400)
POIKILOCYTOSIS BLD QL AUTO: SIGNIFICANT CHANGE UP
POLYCHROMASIA BLD QL SMEAR: SLIGHT — SIGNIFICANT CHANGE UP
POTASSIUM SERPL-MCNC: 3.9 MMOL/L — SIGNIFICANT CHANGE UP (ref 3.5–5)
POTASSIUM SERPL-SCNC: 3.9 MMOL/L — SIGNIFICANT CHANGE UP (ref 3.5–5)
PROCALCITONIN SERPL-MCNC: 1.33 NG/ML — HIGH (ref 0.02–0.1)
PROT SERPL-MCNC: 6.5 G/DL — SIGNIFICANT CHANGE UP (ref 6–8)
RBC # BLD: 4.88 M/UL — SIGNIFICANT CHANGE UP (ref 4.2–5.4)
RBC # FLD: 15.9 % — HIGH (ref 11.5–14.5)
RBC BLD AUTO: ABNORMAL
SODIUM SERPL-SCNC: 139 MMOL/L — SIGNIFICANT CHANGE UP (ref 135–146)
SPECIMEN SOURCE: SIGNIFICANT CHANGE UP
SPECIMEN SOURCE: SIGNIFICANT CHANGE UP
VARIANT LYMPHS # BLD: 0.9 % — SIGNIFICANT CHANGE UP (ref 0–5)
WBC # BLD: 10.05 K/UL — SIGNIFICANT CHANGE UP (ref 4.8–10.8)
WBC # FLD AUTO: 10.05 K/UL — SIGNIFICANT CHANGE UP (ref 4.8–10.8)

## 2023-02-14 PROCEDURE — 71045 X-RAY EXAM CHEST 1 VIEW: CPT | Mod: 26

## 2023-02-14 PROCEDURE — 99233 SBSQ HOSP IP/OBS HIGH 50: CPT

## 2023-02-14 RX ADMIN — Medication 40 MILLIGRAM(S): at 05:46

## 2023-02-14 RX ADMIN — LEVETIRACETAM 500 MILLIGRAM(S): 250 TABLET, FILM COATED ORAL at 17:36

## 2023-02-14 RX ADMIN — Medication 100 MILLIGRAM(S): at 21:12

## 2023-02-14 RX ADMIN — Medication 50 MILLIGRAM(S): at 17:36

## 2023-02-14 RX ADMIN — ATORVASTATIN CALCIUM 40 MILLIGRAM(S): 80 TABLET, FILM COATED ORAL at 21:12

## 2023-02-14 RX ADMIN — Medication 81 MILLIGRAM(S): at 11:43

## 2023-02-14 RX ADMIN — Medication 100 MILLIGRAM(S): at 05:46

## 2023-02-14 RX ADMIN — CITALOPRAM 20 MILLIGRAM(S): 10 TABLET, FILM COATED ORAL at 11:43

## 2023-02-14 RX ADMIN — LEVETIRACETAM 500 MILLIGRAM(S): 250 TABLET, FILM COATED ORAL at 05:46

## 2023-02-14 RX ADMIN — Medication 100 MILLIGRAM(S): at 14:03

## 2023-02-14 RX ADMIN — Medication 50 MILLIGRAM(S): at 05:46

## 2023-02-14 RX ADMIN — APIXABAN 5 MILLIGRAM(S): 2.5 TABLET, FILM COATED ORAL at 05:46

## 2023-02-14 RX ADMIN — APIXABAN 5 MILLIGRAM(S): 2.5 TABLET, FILM COATED ORAL at 17:36

## 2023-02-14 RX ADMIN — Medication 40 MILLIGRAM(S): at 14:11

## 2023-02-14 NOTE — PROGRESS NOTE ADULT - ATTENDING COMMENTS
77-year-old female past medical history of CVA, aphasic at baseline cannot provide detailed history, seizure prophylaxis (on phenytoin and Keppra), HTN, Afib s/p DCCV on eliquis, [facility] ([facility] 28[facility] with concerns of shortness of breath shallow breathing nausea and sweating.  Per nursing home nurse and patient's brother Krzysztof and sister-in-law Bev patient developed the symptoms overnight at approximately 4:00 in the morning.  Patient had an O2 sat between 88 to 90% and improved with nasal cannula. Patient denies any chest pain or shortness of breath. She denies any fever or chills. No weakness, headache or dizziness. She denies any palpitations. She was aox2 responding pleasantly to the questions. She also denies any urinary symptoms.  Of note, the patient was admitted one month ago for SOB and was found to have HFrEF and AFib to which she got cardioverted back to sinus rhythm.  In the ED, patient was found to be febrile 100.6, BP soft SBP 95. Lab work was remarkable for midly elevated transaminitis, Trop of 0.01, BNP 30K, EKG NSR with APCs, CXR with right pleural effusion and possible opacities.    (09 Feb 2023 17:41)  now Echo shows improved ef    # acute on chronic HFrEF, cont diuretics  # ckd  3 at baseline  creatinine trend  1.2 (02-14-23 @ 06:28)  1.2 (02-13-23 @ 06:10)  1.3 (02-12-23 @ 07:02)  Creatinine, Serum: 1.2 mg/dL (01.06.23 @ 15:19)    monitor cr     # Paroxysmal afib cont ac    # sz do cont meds     #Progress Note Handoff  Pending (specify):  Clinical improvement   Family discussion: karey pt   Disposition: snf 77-year-old female past medical history of CVA, aphasic at baseline cannot provide detailed history, seizure prophylaxis (on phenytoin and Keppra), HTN, Afib s/p DCCV on eliquis, [facility] (EF [facility] with concerns of shortness of breath shallow breathing nausea and sweating.  Per nursing home nurse and patient's brother Krzysztof and sister-in-law Bev patient developed the symptoms overnight at approximately 4:00 in the morning.  Patient had an O2 sat between 88 to 90% and improved with nasal cannula. Patient denies any chest pain or shortness of breath. She denies any fever or chills. No weakness, headache or dizziness. She denies any palpitations. She was aox2 responding pleasantly to the questions. She also denies any urinary symptoms.  Of note, the patient was admitted one month ago for SOB and was found to have HFrEF and AFib to which she got cardioverted back to sinus rhythm.  In the ED, patient was found to be febrile 100.6, BP soft SBP 95. Lab work was remarkable for midly elevated transaminitis, Trop of 0.01, BNP 30K, EKG NSR with APCs, CXR with right pleural effusion and possible opacities.    (09 Feb 2023 17:41)  now Echo shows improved ef    # acute on chronic HFrEF, cont diuretics  # ckd  3 at baseline  creatinine trend  1.2 (02-14-23 @ 06:28)  1.2 (02-13-23 @ 06:10)  1.3 (02-12-23 @ 07:02)  Creatinine, Serum: 1.2 mg/dL (01.06.23 @ 15:19)    monitor cr     # Paroxysmal afib cont ac    # sz do cont meds     #Progress Note Handoff  Pending (specify):  Clinical improvement   Family discussion: dw pt   Disposition: snf  time spent 35 min

## 2023-02-14 NOTE — PROGRESS NOTE ADULT - SUBJECTIVE AND OBJECTIVE BOX
INTERNAL MEDICINE PROGRESS NOTE  HOUSTON KING 77y Female  MRN#: [id number]     Hospital Day: 5d  Pt is currently admitted with the primary diagnosis of CHF    SUBJECTIVE  Overnight events     Subjective complaints  Patient was evaluated this morning. Reports no shortness of breath, wheezing, coughing, chest pain, or palpitations.  Pending placement to rehab in [place].  Pt reports that legs do not feel more swollen compared to her baseline.                                           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)    Paroxysmal atrial fibrillation    HF (heart failure)                                                ALLERGIES:  No Known Allergies                           HOME MEDICATIONS  Home Medications:  apixaban 5 mg oral tablet: 1 tab(s) orally every 12 hours (09 Feb 2023 17:39)  aspirin 81 mg oral delayed release tablet: 1 tab(s) orally once a day (09 Feb 2023 17:39)  citalopram 20 mg oral tablet: 1 tab(s) orally once a day (09 Feb 2023 17:39)  Keppra 500 mg oral tablet: 1 tab(s) orally 2 times a day (09 Feb 2023 17:39)  metoprolol tartrate 50 mg oral tablet: 1 tab(s) orally 2 times a day (09 Feb 2023 17:39)  nystatin 100,000 units/g topical cream: 1 application topically 2 times a day (09 Feb 2023 17:39)  phenytoin 100 mg oral capsule: 1 tab(s) orally 3 times a day (09 Feb 2023 17:39)                           MEDICATIONS:  STANDING MEDICATIONS  apixaban 5 milliGRAM(s) Oral every 12 hours  aspirin enteric coated 81 milliGRAM(s) Oral daily  atorvastatin 40 milliGRAM(s) Oral at bedtime  citalopram 20 milliGRAM(s) Oral daily  furosemide    Tablet 40 milliGRAM(s) Oral two times a day  levETIRAcetam 500 milliGRAM(s) Oral two times a day  metoprolol tartrate 50 milliGRAM(s) Oral two times a day  phenytoin   Capsule 100 milliGRAM(s) Oral three times a day    PRN MEDICATIONS  acetaminophen     Tablet .. 650 milliGRAM(s) Oral every 6 hours PRN  melatonin 3 milliGRAM(s) Oral at bedtime PRN                                            ------------------------------------------------------------  VITAL SIGNS: Last 24 Hours  T(C): 36.2 (14 Feb 2023 05:00), Max: 36.2 (13 Feb 2023 20:00)  T(F): 97.1 (14 Feb 2023 05:00), Max: 97.1 (13 Feb 2023 20:00)  HR: 91 (14 Feb 2023 05:00) (80 - 92)  BP: 134/68 (14 Feb 2023 05:00) (108/59 - 134/68)  BP(mean): --  RR: 18 (14 Feb 2023 05:00) (18 - 18)  SpO2: 98% (14 Feb 2023 07:44) (98% - 98%)                                               LABS:                        13.8   x     )-----------( 164      ( 14 Feb 2023 06:28 )             42.7     02-14    139  |  96<L>  |  20  ----------------------------<  102<H>  3.9   |  36<H>  |  1.2    Ca    9.2      14 Feb 2023 06:28  Mg     2.0     02-14    TPro  6.5  /  Alb  4.0  /  TBili  0.7  /  DBili  x   /  AST  68<H>  /  ALT  68<H>  /  AlkPhos  405<H>  02-14          Lactate, Blood: 2.0 mmol/L (02-13-23 @ 11:18)                                                      RADIOLOGY:    PHYSICAL EXAM:  GENERAL: NAD, lying in bed comfortably on room air  HEAD:  Atraumatic, Normocephalic  EYES: EOMI, PERRLA, conjunctiva and sclera clear  CHEST/LUNG: Clear to auscultation bilaterally; No rales, rhonchi, wheezing, or rubs. Unlabored respirations  HEART: Regular rate and rhythm; No murmurs, rubs, or gallops  ABDOMEN: Bowel Sounds present; Soft, nontender, nondistended  EXTREMITIES:  Nonpitting edema present BL LE. 2+ Peripheral Pulses, brisk capillary refill.  NERVOUS SYSTEM:  A&Ox3, no focal deficits   SKIN: No rashes or lesions                                       ASSESSMENT & PLAN  HOUSTON KING is a 77y Female with a history significant for  htn, AF s/p dccv, chronic HFrEF (EF 28%), seizure disorder with h/o CVA who p/w sob and hypoxia at [facility] (where patient had been since her discharge from [facility] on 1/26/23 (after 20+ day hospitalization).  In the ED patient was febrile 100.6 and admitted to [facility] for acute on chronic HFrEF exacerbation.  During prior hospitalization antihypertensive and diuretic medications were held 2/2 hypotension and slowly restarted prior to dc as well as to be reassessed as outpatient.    Acute hypoxic respiratory failure   Acute on chronic HFrEF with suspected pneumonia (HCAP)  - Discontinued cefepime s/p 5 day course  - change IV lasix to PO as pt euveolmic.  - repeat echo shows improvement in EF from 28:->35-40%  -covid negative  -legionella negative  -mrsa neg  -f/u RVP  -procal 3.11 with crp 72  -aspiration precautions--sp/swallow diet modification recomm    Chronic HFrEF  - likley systolic failure due to RVR  - EF recovering  - cont asa, statin, entresto, eliquis 5 mg bid, metoprolol 50 mg bid    Transaminitis  - unclear etiology  - elevated ggt  - denies RUQ pain, bilirubin normal    CVA   seizure  -continue asa, statin and eliquis 5 mg bid for stroke prevention for AF  -cont keppra and dilantin--monitor levels    Chronic paroxysm AF  - c/w metoprolol, eliquis    DVT/GI ppx  guarded prognosis    FULL CODE   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 Reports no shortness of breath, wheezing, coughing, chest pain, or palpitations.  Pending placement to rehab in [place].  Pt reports that legs do not feel more swollen compared to her baseline.  Ordering CXR to assess for improvement of overload.                                           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)    Paroxysmal atrial fibrillation    HF (heart failure)                                                ALLERGIES:  No Known Allergies                           HOME MEDICATIONS  Home Medications:  apixaban 5 mg oral tablet: 1 tab(s) orally every 12 hours (09 Feb 2023 17:39)  aspirin 81 mg oral delayed release tablet: 1 tab(s) orally once a day (09 Feb 2023 17:39)  citalopram 20 mg oral tablet: 1 tab(s) orally once a day (09 Feb 2023 17:39)  Keppra 500 mg oral tablet: 1 tab(s) orally 2 times a day (09 Feb 2023 17:39)  metoprolol tartrate 50 mg oral tablet: 1 tab(s) orally 2 times a day (09 Feb 2023 17:39)  nystatin 100,000 units/g topical cream: 1 application topically 2 times a day (09 Feb 2023 17:39)  phenytoin 100 mg oral capsule: 1 tab(s) orally 3 times a day (09 Feb 2023 17:39)                           MEDICATIONS:  STANDING MEDICATIONS  apixaban 5 milliGRAM(s) Oral every 12 hours  aspirin enteric coated 81 milliGRAM(s) Oral daily  atorvastatin 40 milliGRAM(s) Oral at bedtime  citalopram 20 milliGRAM(s) Oral daily  furosemide    Tablet 40 milliGRAM(s) Oral two times a day  levETIRAcetam 500 milliGRAM(s) Oral two times a day  metoprolol tartrate 50 milliGRAM(s) Oral two times a day  phenytoin   Capsule 100 milliGRAM(s) Oral three times a day    PRN MEDICATIONS  acetaminophen     Tablet .. 650 milliGRAM(s) Oral every 6 hours PRN  melatonin 3 milliGRAM(s) Oral at bedtime PRN                                            ------------------------------------------------------------  VITAL SIGNS: Last 24 Hours  T(C): 36.2 (14 Feb 2023 05:00), Max: 36.2 (13 Feb 2023 20:00)  T(F): 97.1 (14 Feb 2023 05:00), Max: 97.1 (13 Feb 2023 20:00)  HR: 91 (14 Feb 2023 05:00) (80 - 92)  BP: 134/68 (14 Feb 2023 05:00) (108/59 - 134/68)  BP(mean): --  RR: 18 (14 Feb 2023 05:00) (18 - 18)  SpO2: 98% (14 Feb 2023 07:44) (98% - 98%)                                               LABS:                        13.8   x     )-----------( 164      ( 14 Feb 2023 06:28 )             42.7     02-14    139  |  96<L>  |  20  ----------------------------<  102<H>  3.9   |  36<H>  |  1.2    Ca    9.2      14 Feb 2023 06:28  Mg     2.0     02-14    TPro  6.5  /  Alb  4.0  /  TBili  0.7  /  DBili  x   /  AST  68<H>  /  ALT  68<H>  /  AlkPhos  405<H>  02-14          Lactate, Blood: 2.0 mmol/L (02-13-23 @ 11:18)                                                      RADIOLOGY:    PHYSICAL EXAM:  GENERAL: NAD, lying in bed comfortably on room air  HEAD:  Atraumatic, Normocephalic  EYES: EOMI, PERRLA, conjunctiva and sclera clear  CHEST/LUNG: Clear to auscultation bilaterally; No rales, rhonchi, wheezing, or rubs. Unlabored respirations  HEART: Regular rate and rhythm; No murmurs, rubs, or gallops  ABDOMEN: Bowel Sounds present; Soft, nontender, nondistended  EXTREMITIES:  Nonpitting edema present BL LE. 2+ Peripheral Pulses, brisk capillary refill.  NERVOUS SYSTEM:  A&Ox3, no focal deficits   SKIN: No rashes or lesions                                       ASSESSMENT & PLAN  HOUSTON KING is a 77y Female with a history significant for  htn, AF s/p dccv, chronic HFrEF (EF 28%), seizure disorder with h/o CVA who p/w sob and hypoxia at [facility] (where patient had been since her discharge from [facility] on 1/26/23 (after 20+ day hospitalization).  In the ED patient was febrile 100.6 and admitted to [facility] for acute on chronic HFrEF exacerbation.  During prior hospitalization antihypertensive and diuretic medications were held 2/2 hypotension and slowly restarted prior to dc as well as to be reassessed as outpatient.    Acute hypoxic respiratory failure   Acute on chronic HFrEF with suspected pneumonia (HCAP)  - Discontinued cefepime s/p 5 day course  - change IV lasix to PO as pt euveolmic.  - repeat echo shows improvement in EF from 28:->35-40%  -covid negative  -legionella negative  -mrsa neg  -f/u RVP  -procal 3.11 with crp 72  -aspiration precautions--sp/swallow diet modification recomm    Chronic HFrEF  - likley systolic failure due to RVR  - EF recovering  - cont asa, statin, entresto, eliquis 5 mg bid, metoprolol 50 mg bid    Transaminitis  - unclear etiology  - elevated ggt  - denies RUQ pain, bilirubin normal    CVA   seizure  -continue asa, statin and eliquis 5 mg bid for stroke prevention for AF  -cont keppra and dilantin--monitor levels    Chronic paroxysm AF  - c/w metoprolol, eliquis    DVT/GI ppx  guarded prognosis    FULL CODE   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    #Progress Note Handoff  Pending (specify):  placement   Family discussion: d/w sister-in-law  Disposition: SNF_x__(family and pt want [facility] when ready for dc)                                                                               --------------------------------------------------------    # Handoff      INTERNAL MEDICINE PROGRESS NOTE  HOUSTON KING 77y Female  MRN#: [id number]     Hospital Day: 5d  Pt is currently admitted with the primary diagnosis of CHF    SUBJECTIVE  Overnight events     Subjective complaints  Patient was evaluated this morning. Reports no shortness of breath, wheezing, coughing, chest pain, or palpitations.  Pending placement to rehab in [place].  Pt reports that legs do not feel more swollen compared to her baseline.  Ordering CXR to assess for improvement of overload.                                           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)  Paroxysmal atrial fibrillation  HF (heart failure)                                            ALLERGIES:  No Known Allergies    HOME MEDICATIONS  Home Medications:  apixaban 5 mg oral tablet: 1 tab(s) orally every 12 hours (09 Feb 2023 17:39)  aspirin 81 mg oral delayed release tablet: 1 tab(s) orally once a day (09 Feb 2023 17:39)  citalopram 20 mg oral tablet: 1 tab(s) orally once a day (09 Feb 2023 17:39)  Keppra 500 mg oral tablet: 1 tab(s) orally 2 times a day (09 Feb 2023 17:39)  metoprolol tartrate 50 mg oral tablet: 1 tab(s) orally 2 times a day (09 Feb 2023 17:39)  nystatin 100,000 units/g topical cream: 1 application topically 2 times a day (09 Feb 2023 17:39)  phenytoin 100 mg oral capsule: 1 tab(s) orally 3 times a day (09 Feb 2023 17:39)                         MEDICATIONS:  STANDING MEDICATIONS  apixaban 5 milliGRAM(s) Oral every 12 hours  aspirin enteric coated 81 milliGRAM(s) Oral daily  atorvastatin 40 milliGRAM(s) Oral at bedtime  citalopram 20 milliGRAM(s) Oral daily  furosemide    Tablet 40 milliGRAM(s) Oral two times a day  levETIRAcetam 500 milliGRAM(s) Oral two times a day  metoprolol tartrate 50 milliGRAM(s) Oral two times a day  phenytoin   Capsule 100 milliGRAM(s) Oral three times a day    PRN MEDICATIONS  acetaminophen     Tablet .. 650 milliGRAM(s) Oral every 6 hours PRN  melatonin 3 milliGRAM(s) Oral at bedtime PRN                                            ------------------------------------------------------------  VITAL SIGNS: Last 24 Hours  T(C): 36.2 (14 Feb 2023 05:00), Max: 36.2 (13 Feb 2023 20:00)  T(F): 97.1 (14 Feb 2023 05:00), Max: 97.1 (13 Feb 2023 20:00)  HR: 91 (14 Feb 2023 05:00) (80 - 92)  BP: 134/68 (14 Feb 2023 05:00) (108/59 - 134/68)  BP(mean): --  RR: 18 (14 Feb 2023 05:00) (18 - 18)  SpO2: 98% (14 Feb 2023 07:44) (98% - 98%)                                               LABS:                        13.8   x     )-----------( 164      ( 14 Feb 2023 06:28 )             42.7     02-14    139  |  96<L>  |  20  ----------------------------<  102<H>  3.9   |  36<H>  |  1.2    Ca    9.2      14 Feb 2023 06:28  Mg     2.0     02-14    TPro  6.5  /  Alb  4.0  /  TBili  0.7  /  DBili  x   /  AST  68<H>  /  ALT  68<H>  /  AlkPhos  405<H>  02-14          Lactate, Blood: 2.0 mmol/L (02-13-23 @ 11:18)                                                      RADIOLOGY:    PHYSICAL EXAM:  GENERAL: NAD, lying in bed comfortably on room air  HEAD:  Atraumatic, Normocephalic  EYES: EOMI, PERRLA, conjunctiva and sclera clear  CHEST/LUNG: Clear to auscultation bilaterally; No rales, rhonchi, wheezing, or rubs. Unlabored respirations  HEART: Regular rate and rhythm; No murmurs, rubs, or gallops  ABDOMEN: Bowel Sounds present; Soft, nontender, nondistended  EXTREMITIES:  Nonpitting edema present BL LE. 2+ Peripheral Pulses, brisk capillary refill.  NERVOUS SYSTEM:  A&Ox3, no focal deficits   SKIN: No rashes or lesions                                       ASSESSMENT & PLAN  HOUSTON KING is a 77y Female with a history significant for  htn, AF s/p dccv, chronic HFrEF (EF 28%), seizure disorder with h/o CVA who p/w sob and hypoxia at [facility] (where patient had been since her discharge from [facility] on 1/26/23 (after 20+ day hospitalization).  In the ED patient was febrile 100.6 and admitted to [facility] for acute on chronic HFrEF exacerbation.  During prior hospitalization antihypertensive and diuretic medications were held 2/2 hypotension and slowly restarted prior to dc as well as to be reassessed as outpatient.    Acute hypoxic respiratory failure   Acute on chronic HFrEF with suspected pneumonia (HCAP)  - Discontinued cefepime s/p 5 day course  - change IV lasix to PO as pt euveolmic.  - repeat echo shows improvement in EF from 28:->35-40%  -covid negative  -legionella negative  -mrsa neg  -f/u RVP  -procal 3.11 with crp 72  -aspiration precautions--sp/swallow diet modification recomm    Chronic HFrEF  - likley systolic failure due to RVR  - EF recovering  - cont asa, statin, entresto, eliquis 5 mg bid, metoprolol 50 mg bid    Transaminitis  - unclear etiology  - elevated ggt  - denies RUQ pain, bilirubin normal    CVA   seizure  -continue asa, statin and eliquis 5 mg bid for stroke prevention for AF  -cont keppra and dilantin--monitor levels    Chronic paroxysm AF  - c/w metoprolol, eliquis    DVT/GI ppx  guarded prognosis    FULL CODE   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

## 2023-02-15 LAB
ALBUMIN SERPL ELPH-MCNC: 3.6 G/DL — SIGNIFICANT CHANGE UP (ref 3.5–5.2)
ALP SERPL-CCNC: 406 U/L — HIGH (ref 30–115)
ALT FLD-CCNC: 55 U/L — HIGH (ref 0–41)
ANION GAP SERPL CALC-SCNC: 8 MMOL/L — SIGNIFICANT CHANGE UP (ref 7–14)
AST SERPL-CCNC: 44 U/L — HIGH (ref 0–41)
BASOPHILS # BLD AUTO: 0.09 K/UL — SIGNIFICANT CHANGE UP (ref 0–0.2)
BASOPHILS NFR BLD AUTO: 0.9 % — SIGNIFICANT CHANGE UP (ref 0–1)
BILIRUB SERPL-MCNC: 0.6 MG/DL — SIGNIFICANT CHANGE UP (ref 0.2–1.2)
BUN SERPL-MCNC: 19 MG/DL — SIGNIFICANT CHANGE UP (ref 10–20)
CALCIUM SERPL-MCNC: 9.3 MG/DL — SIGNIFICANT CHANGE UP (ref 8.4–10.5)
CHLORIDE SERPL-SCNC: 96 MMOL/L — LOW (ref 98–110)
CO2 SERPL-SCNC: 34 MMOL/L — HIGH (ref 17–32)
CREAT SERPL-MCNC: 1.1 MG/DL — SIGNIFICANT CHANGE UP (ref 0.7–1.5)
EGFR: 52 ML/MIN/1.73M2 — LOW
EOSINOPHIL # BLD AUTO: 3.96 K/UL — HIGH (ref 0–0.7)
EOSINOPHIL NFR BLD AUTO: 40.6 % — HIGH (ref 0–8)
GLUCOSE SERPL-MCNC: 92 MG/DL — SIGNIFICANT CHANGE UP (ref 70–99)
HCT VFR BLD CALC: 41.6 % — SIGNIFICANT CHANGE UP (ref 37–47)
HGB BLD-MCNC: 13.5 G/DL — SIGNIFICANT CHANGE UP (ref 12–16)
IMM GRANULOCYTES NFR BLD AUTO: 0.5 % — HIGH (ref 0.1–0.3)
LYMPHOCYTES # BLD AUTO: 1.59 K/UL — SIGNIFICANT CHANGE UP (ref 1.2–3.4)
LYMPHOCYTES # BLD AUTO: 16.3 % — LOW (ref 20.5–51.1)
MAGNESIUM SERPL-MCNC: 2.1 MG/DL — SIGNIFICANT CHANGE UP (ref 1.8–2.4)
MCHC RBC-ENTMCNC: 28.3 PG — SIGNIFICANT CHANGE UP (ref 27–31)
MCHC RBC-ENTMCNC: 32.5 G/DL — SIGNIFICANT CHANGE UP (ref 32–37)
MCV RBC AUTO: 87.2 FL — SIGNIFICANT CHANGE UP (ref 81–99)
MONOCYTES # BLD AUTO: 0.7 K/UL — HIGH (ref 0.1–0.6)
MONOCYTES NFR BLD AUTO: 7.2 % — SIGNIFICANT CHANGE UP (ref 1.7–9.3)
NEUTROPHILS # BLD AUTO: 3.37 K/UL — SIGNIFICANT CHANGE UP (ref 1.4–6.5)
NEUTROPHILS NFR BLD AUTO: 34.5 % — LOW (ref 42.2–75.2)
NRBC # BLD: 0 /100 WBCS — SIGNIFICANT CHANGE UP (ref 0–0)
PLATELET # BLD AUTO: 169 K/UL — SIGNIFICANT CHANGE UP (ref 130–400)
POTASSIUM SERPL-MCNC: 3.4 MMOL/L — LOW (ref 3.5–5)
POTASSIUM SERPL-SCNC: 3.4 MMOL/L — LOW (ref 3.5–5)
PROT SERPL-MCNC: 6.5 G/DL — SIGNIFICANT CHANGE UP (ref 6–8)
RBC # BLD: 4.77 M/UL — SIGNIFICANT CHANGE UP (ref 4.2–5.4)
RBC # FLD: 15.6 % — HIGH (ref 11.5–14.5)
SODIUM SERPL-SCNC: 138 MMOL/L — SIGNIFICANT CHANGE UP (ref 135–146)
WBC # BLD: 9.76 K/UL — SIGNIFICANT CHANGE UP (ref 4.8–10.8)
WBC # FLD AUTO: 9.76 K/UL — SIGNIFICANT CHANGE UP (ref 4.8–10.8)

## 2023-02-15 PROCEDURE — 99233 SBSQ HOSP IP/OBS HIGH 50: CPT

## 2023-02-15 RX ORDER — POTASSIUM CHLORIDE 20 MEQ
20 PACKET (EA) ORAL
Refills: 0 | Status: COMPLETED | OUTPATIENT
Start: 2023-02-15 | End: 2023-02-15

## 2023-02-15 RX ADMIN — Medication 40 MILLIGRAM(S): at 06:37

## 2023-02-15 RX ADMIN — LEVETIRACETAM 500 MILLIGRAM(S): 250 TABLET, FILM COATED ORAL at 06:36

## 2023-02-15 RX ADMIN — Medication 50 MILLIGRAM(S): at 18:32

## 2023-02-15 RX ADMIN — Medication 20 MILLIEQUIVALENT(S): at 13:08

## 2023-02-15 RX ADMIN — Medication 50 MILLIGRAM(S): at 06:36

## 2023-02-15 RX ADMIN — Medication 100 MILLIGRAM(S): at 06:36

## 2023-02-15 RX ADMIN — Medication 100 MILLIGRAM(S): at 21:36

## 2023-02-15 RX ADMIN — Medication 20 MILLIEQUIVALENT(S): at 12:00

## 2023-02-15 RX ADMIN — LEVETIRACETAM 500 MILLIGRAM(S): 250 TABLET, FILM COATED ORAL at 18:32

## 2023-02-15 RX ADMIN — APIXABAN 5 MILLIGRAM(S): 2.5 TABLET, FILM COATED ORAL at 06:36

## 2023-02-15 RX ADMIN — ATORVASTATIN CALCIUM 40 MILLIGRAM(S): 80 TABLET, FILM COATED ORAL at 21:36

## 2023-02-15 RX ADMIN — Medication 40 MILLIGRAM(S): at 13:08

## 2023-02-15 RX ADMIN — APIXABAN 5 MILLIGRAM(S): 2.5 TABLET, FILM COATED ORAL at 18:33

## 2023-02-15 RX ADMIN — CITALOPRAM 20 MILLIGRAM(S): 10 TABLET, FILM COATED ORAL at 12:01

## 2023-02-15 RX ADMIN — Medication 100 MILLIGRAM(S): at 13:07

## 2023-02-15 RX ADMIN — Medication 81 MILLIGRAM(S): at 12:01

## 2023-02-15 NOTE — PROGRESS NOTE ADULT - SUBJECTIVE AND OBJECTIVE BOX
HOUSTON KING  77y  Female      Patient is a 77y old  Female who presents with a chief complaint of Shortness of breath .      INTERVAL HPI/OVERNIGHT EVENTS:      ******************************* REVIEW OF SYSTEMS:**********************************************    All other review of systems negative    *********************** VITALS ******************************************    T(F): 97.6 (02-15-23 @ 04:30)  HR: 65 (02-15-23 @ 04:30) (64 - 65)  BP: 123/60 (02-15-23 @ 04:30) (105/52 - 123/60)  RR: 18 (02-15-23 @ 04:30) (18 - 18)  SpO2: --            ******************************** PHYSICAL EXAM:**************************************************  GENERAL: NAD    PSYCH: no agitation, baseline mentation  HEENT:     NERVOUS SYSTEM:  Alert & Oriented X3,     PULMONARY: SUSANNA, CTA    CARDIOVASCULAR: S1S2 RRR    GI: Soft, NT, ND; BS present.    EXTREMITIES:  2+ Peripheral Pulses, No clubbing, cyanosis, or edema    LYMPH: No lymphadenopathy noted    SKIN: No rashes or lesions      **************************** LABS *******************************************************                          13.5   9.76  )-----------( 169      ( 15 Feb 2023 07:55 )             41.6     02-15    138  |  96<L>  |  19  ----------------------------<  92  3.4<L>   |  34<H>  |  1.1    Ca    9.3      15 Feb 2023 07:55  Mg     2.1     02-15    TPro  6.5  /  Alb  3.6  /  TBili  0.6  /  DBili  x   /  AST  44<H>  /  ALT  55<H>  /  AlkPhos  406<H>  02-15          Lactate Trend  02-13 @ 11:18 Lactate:2.0         CAPILLARY BLOOD GLUCOSE      POCT Blood Glucose.: 137 mg/dL (13 Feb 2023 21:30)          **************************Active Medications *******************************************  No Known Allergies      acetaminophen     Tablet .. 650 milliGRAM(s) Oral every 6 hours PRN  apixaban 5 milliGRAM(s) Oral every 12 hours  aspirin enteric coated 81 milliGRAM(s) Oral daily  atorvastatin 40 milliGRAM(s) Oral at bedtime  citalopram 20 milliGRAM(s) Oral daily  furosemide    Tablet 40 milliGRAM(s) Oral two times a day  levETIRAcetam 500 milliGRAM(s) Oral two times a day  melatonin 3 milliGRAM(s) Oral at bedtime PRN  metoprolol tartrate 50 milliGRAM(s) Oral two times a day  phenytoin   Capsule 100 milliGRAM(s) Oral three times a day      ***************************************************  RADIOLOGY & ADDITIONAL TESTS:    Imaging Personally Reviewed:  [ ] YES  [ ] NO    HEALTH ISSUES - PROBLEM Dx:

## 2023-02-15 NOTE — PROGRESS NOTE ADULT - ASSESSMENT
HOUSTON KING is a 77y Female with a history significant for  htn, AF s/p dccv, chronic HFrEF (EF 28%), seizure disorder with h/o CVA who p/w sob and hypoxia at [facility] (where patient had been since her discharge from [facility] on 1/26/23 (after 20+ day hospitalization).  In the ED patient was febrile 100.6 and admitted to [facility] for acute on chronic HFrEF exacerbation.  During prior hospitalization antihypertensive and diuretic medications were held 2/2 hypotension and slowly restarted prior to dc as well as to be reassessed as outpatient.    Acute hypoxic respiratory failure   Acute on chronic HFrEF with suspected pneumonia (HCAP)  - Discontinued cefepime s/p 5 day course  - change IV lasix to PO as pt euveolmic.  - repeat echo shows improvement in EF from 28:->35-40%  -covid negative  -legionella negative  -mrsa neg  -procal 3.11 with crp 72  -aspiration precautions--sp/swallow diet modification recomm    Chronic HFrEF  - likley systolic failure due to RVR  - EF recovering  - cont asa, statin, entresto, eliquis 5 mg bid, metoprolol 50 mg bid    Transaminitis  - unclear etiology  - elevated ggt  - denies RUQ pain, bilirubin normal    CVA   seizure  -continue asa, statin and eliquis 5 mg bid for stroke prevention for AF  -cont keppra and dilantin--monitor levels    Paroxysmal  AF  - c/w metoprolol, eliquis    DVT/GI ppx  guarded prognosis    FULL CODE   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   Disposition: SNF

## 2023-02-15 NOTE — DISCHARGE NOTE PROVIDER - CARE PROVIDER_API CALL
Susan Lam)  Internal Medicine  [address] [place] [address], [address] [place] [address]  Phone: [phone]  Fax: [phone]  Follow Up Time: 2 weeks

## 2023-02-15 NOTE — DISCHARGE NOTE PROVIDER - HOSPITAL COURSE
77-year-old female past medical history of CVA, aphasic at baseline cannot provide detailed history, seizure prophylaxis (on phenytoin and Keppra), HTN, Afib s/p DCCV on eliquis, HFrEF (EF 28%) [facility] nursing home with concerns of shortness of breath shallow breathing nausea and sweating.  Per nursing home nurse and patient's brother Krzysztof and sister-in-law Bev patient developed the symptoms overnight at approximately 4:00 in the morning of admission. Patient had an O2 sat between 88 to 90% and improved with nasal cannula. Patient denies any chest pain or shortness of breath. She denies any fever or chills. No weakness, headache or dizziness. She denies any palpitations. She was aox2 responding pleasantly to the questions. She also denies any urinary symptoms.  Of note, the patient was admitted one month ago for SOB and was found to have HFrEF and AFib to which she got cardioverted back to sinus rhythm.  In the ED, patient was found to be febrile 100.6, BP soft SBP 95. Lab work was remarkable for midly elevated transaminitis, Trop of 0.01, BNP 30K, EKG NSR with APCs, CXR with right pleural effusion and possible opacities.     During prior hospitalization antihypertensive and diuretic medications were held 2/2 hypotension and slowly restarted prior to dc as well as to be reassessed as outpatient.    Throughout hospitalization, pt received diuretics and breathing status improved and returned to baseline.    Acute hypoxic respiratory failure   Acute on chronic HFrEF with suspected pneumonia (HCAP)  - Discontinued cefepime s/p 5 day course  - change IV lasix to PO as pt euveolmic.  - repeat echo shows improvement in EF from 28:->35-40%  -covid negative  -legionella negative  -mrsa neg  -f/u RVP  -procal 3.11 with crp 72  -aspiration precautions--sp/swallow diet modification recomm    Chronic HFrEF  - likley systolic failure due to RVR  - EF recovering  - cont asa, statin, entresto, eliquis 5 mg bid, metoprolol 50 mg bid    Transaminitis  - unclear etiology  - elevated ggt  - denies RUQ pain, bilirubin normal    CVA   seizure  -continue asa, statin and eliquis 5 mg bid for stroke prevention for AF  -cont keppra and dilantin--monitor levels    Chronic paroxysm AF  - c/w metoprolol, eliquis    DVT/GI ppx  guarded prognosis    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  Per nursing home nurse and patient's brother Krzysztof and sister-in-law Bev patient developed the symptoms overnight at approximately 4:00 in the morning of admission. Patient had an O2 sat between 88 to 90% and improved with nasal cannula. Patient denies any chest pain or shortness of breath. She denies any fever or chills. No weakness, headache or dizziness. She denies any palpitations. She was aox2 responding pleasantly to the questions. She also denies any urinary symptoms.  Of note, the patient was admitted one month ago for SOB and was found to have HFrEF and AFib to which she got cardioverted back to sinus rhythm.  In the ED, patient was found to be febrile 100.6, BP soft SBP 95. Lab work was remarkable for midly elevated transaminitis, Trop of 0.01, BNP 30K, EKG NSR with APCs, CXR with right pleural effusion and possible opacities.     During prior hospitalization antihypertensive and diuretic medications were held 2/2 hypotension and slowly restarted prior to dc as well as to be reassessed as outpatient.    Throughout hospitalization, pt received diuretics and breathing status improved and returned to baseline.    Acute hypoxic respiratory failure   Acute on chronic HFrEF with suspected pneumonia (HCAP)  - Discontinued cefepime s/p 5 day course  - change IV lasix to PO as pt euveolmic.  - repeat echo shows improvement in EF from 28:->35-40%  -covid negative  -legionella negative  -mrsa neg  -f/u RVP  -procal 3.11 with crp 72  -aspiration precautions--sp/swallow diet modification recomm    Chronic HFrEF  - likley systolic failure due to RVR  - EF recovering  - cont asa, statin, entresto, eliquis 5 mg bid, metoprolol 50 mg bid    Transaminitis  - unclear etiology  - elevated ggt  - denies RUQ pain, bilirubin normal    CVA   seizure  -continue asa, statin and eliquis 5 mg bid for stroke prevention for AF  -cont keppra and dilantin--monitor levels    Chronic paroxysm AF  - c/w metoprolol, eliquis    DVT/GI ppx  guarded prognosis    FULL CODE   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

## 2023-02-15 NOTE — PROGRESS NOTE ADULT - SUBJECTIVE AND OBJECTIVE BOX
INTERNAL MEDICINE PROGRESS NOTE  HOUSTON KING 77y Female  MRN#: [id number]     Hospital Day: 6d  Pt is currently admitted with the primary diagnosis of CHF    SUBJECTIVE  Overnight events     Subjective complaints  Patient was evaluated this morning. Reports no shortness of breath, wheezing, coughing, chest pain, or palpitations.  Pending placement to rehab in [place].  Pt reports that legs do not feel more swollen compared to her baseline.                                           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)  Paroxysmal atrial fibrillation  HF (heart failure)                                            ALLERGIES:  No Known Allergies    HOME MEDICATIONS  Home Medications:  apixaban 5 mg oral tablet: 1 tab(s) orally every 12 hours (09 Feb 2023 17:39)  aspirin 81 mg oral delayed release tablet: 1 tab(s) orally once a day (09 Feb 2023 17:39)  citalopram 20 mg oral tablet: 1 tab(s) orally once a day (09 Feb 2023 17:39)  Keppra 500 mg oral tablet: 1 tab(s) orally 2 times a day (09 Feb 2023 17:39)  metoprolol tartrate 50 mg oral tablet: 1 tab(s) orally 2 times a day (09 Feb 2023 17:39)  nystatin 100,000 units/g topical cream: 1 application topically 2 times a day (09 Feb 2023 17:39)  phenytoin 100 mg oral capsule: 1 tab(s) orally 3 times a day (09 Feb 2023 17:39)                         MEDICATIONS:  STANDING MEDICATIONS  apixaban 5 milliGRAM(s) Oral every 12 hours  aspirin enteric coated 81 milliGRAM(s) Oral daily  atorvastatin 40 milliGRAM(s) Oral at bedtime  citalopram 20 milliGRAM(s) Oral daily  furosemide    Tablet 40 milliGRAM(s) Oral two times a day  levETIRAcetam 500 milliGRAM(s) Oral two times a day  metoprolol tartrate 50 milliGRAM(s) Oral two times a day  phenytoin   Capsule 100 milliGRAM(s) Oral three times a day    PRN MEDICATIONS  acetaminophen     Tablet .. 650 milliGRAM(s) Oral every 6 hours PRN  melatonin 3 milliGRAM(s) Oral at bedtime PRN                                            ------------------------------------------------------------  VITAL SIGNS: Last 24 Hours  T(C): 36.2 (14 Feb 2023 05:00), Max: 36.2 (13 Feb 2023 20:00)  T(F): 97.1 (14 Feb 2023 05:00), Max: 97.1 (13 Feb 2023 20:00)  HR: 91 (14 Feb 2023 05:00) (80 - 92)  BP: 134/68 (14 Feb 2023 05:00) (108/59 - 134/68)  BP(mean): --  RR: 18 (14 Feb 2023 05:00) (18 - 18)  SpO2: 98% (14 Feb 2023 07:44) (98% - 98%)                                               LABS:                        13.5   9.76  )-----------( 169      ( 15 Feb 2023 07:55 )             41.6       02-15    138  |  96<L>  |  19  ----------------------------<  92  3.4<L>   |  34<H>  |  1.1    Ca    9.3      15 Feb 2023 07:55  Mg     2.1     02-15    TPro  6.5  /  Alb  3.6  /  TBili  0.6  /  DBili  x   /  AST  44<H>  /  ALT  55<H>  /  AlkPhos  406<H>  02-15                                      13.8   x     )-----------( 164      ( 14 Feb 2023 06:28 )             42.7     02-14    139  |  96<L>  |  20  ----------------------------<  102<H>  3.9   |  36<H>  |  1.2    Ca    9.2      14 Feb 2023 06:28  Mg     2.0     02-14    TPro  6.5  /  Alb  4.0  /  TBili  0.7  /  DBili  x   /  AST  68<H>  /  ALT  68<H>  /  AlkPhos  405<H>  02-14          Lactate, Blood: 2.0 mmol/L (02-13-23 @ 11:18)                                                      RADIOLOGY:    PHYSICAL EXAM:  GENERAL: NAD, lying in bed comfortably on room air  HEAD:  Atraumatic, Normocephalic  EYES: EOMI, PERRLA, conjunctiva and sclera clear  CHEST/LUNG: Clear to auscultation bilaterally; No rales, rhonchi, wheezing, or rubs. Unlabored respirations  HEART: Regular rate and rhythm; No murmurs, rubs, or gallops  ABDOMEN: Bowel Sounds present; Soft, nontender, nondistended  EXTREMITIES:  Nonpitting edema present BL LE. 2+ Peripheral Pulses, brisk capillary refill.  NERVOUS SYSTEM:  A&Ox3, no focal deficits   SKIN: No rashes or lesions                                       ASSESSMENT & PLAN  HOUSTON KING is a 77y Female with a history significant for  htn, AF s/p dccv, chronic HFrEF (EF 28%), seizure disorder with h/o CVA who p/w sob and hypoxia at [facility] (where patient had been since her discharge from [facility] on 1/26/23 (after 20+ day hospitalization).  In the ED patient was febrile 100.6 and admitted to [facility] for acute on chronic HFrEF exacerbation.  During prior hospitalization antihypertensive and diuretic medications were held 2/2 hypotension and slowly restarted prior to dc as well as to be reassessed as outpatient.    Acute hypoxic respiratory failure   Acute on chronic HFrEF with suspected pneumonia (HCAP)  - Discontinued cefepime s/p 5 day course  - change IV lasix to PO as pt euveolmic.  - repeat echo shows improvement in EF from 28:->35-40%  -covid negative  -legionella negative  -mrsa neg  -f/u RVP  -procal 3.11 with crp 72  -aspiration precautions--sp/swallow diet modification recomm    Chronic HFrEF  - likley systolic failure due to RVR  - EF recovering  - cont asa, statin, entresto, eliquis 5 mg bid, metoprolol 50 mg bid    Transaminitis  - unclear etiology  - elevated ggt  - denies RUQ pain, bilirubin normal    CVA   seizure  -continue asa, statin and eliquis 5 mg bid for stroke prevention for AF  -cont keppra and dilantin--monitor levels    Chronic paroxysm AF  - c/w metoprolol, eliquis    DVT/GI ppx  guarded prognosis    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

## 2023-02-15 NOTE — DISCHARGE NOTE PROVIDER - NSDCCPCAREPLAN_GEN_ALL_CORE_FT
PRINCIPAL DISCHARGE DIAGNOSIS  Diagnosis: Acute CHF  Assessment and Plan of Treatment: Congestive heart failure (CHF) is a chronic condition in which the heart has trouble pumping blood. In some cases of heart failure, fluid may back up into your lungs or you may have swelling (edema) in your lower legs. There are many causes of heart failure including high blood pressure, coronary artery disease, abnormal heart valves, heart muscle disease, lung disease, diabetes, etc. Symptoms include shortness of breath with activity or when lying flat, cough, swelling of the legs, fatigue, or increased urination during the night.   Treatment is aimed at managing the symptoms of heart failure and may include lifestyle changes, medications, or surgical procedures. Take medicines only as directed by your health care provider and do not stop unless instructed to do so. Eat heart-healthy foods with low or no trans/saturated fats, cholesterol and salt. Weigh yourself every day for early recognition of fluid accumulation.  SEEK IMMEDIATE MEDICAL CARE IF YOU HAVE ANY OF THE FOLLOWING SYMPTOMS: shortness of breath, change in mental status, chest pain, lightheadedness/dizziness/fainting, or worsening of symptoms including not being able to conduct normal physical activity.      SECONDARY DISCHARGE DIAGNOSES  Diagnosis: Febrile  Assessment and Plan of Treatment:     Diagnosis: Bilateral pulmonary infiltrates on CXR  Assessment and Plan of Treatment:

## 2023-02-15 NOTE — DISCHARGE NOTE PROVIDER - NSDCMRMEDTOKEN_GEN_ALL_CORE_FT
apixaban 5 mg oral tablet: 1 tab(s) orally every 12 hours  aspirin 81 mg oral delayed release tablet: 1 tab(s) orally once a day  atorvastatin 40 mg oral tablet: 1 tab(s) orally once a day (at bedtime)  citalopram 20 mg oral tablet: 1 tab(s) orally once a day  Keppra 500 mg oral tablet: 1 tab(s) orally 2 times a day  metoprolol tartrate 50 mg oral tablet: 1 tab(s) orally 2 times a day  nystatin 100,000 units/g topical cream: 1 application topically 2 times a day  phenytoin 100 mg oral capsule: 1 tab(s) orally 3 times a day  sacubitril-valsartan 24 mg-26 mg oral tablet: 1 tab(s) orally 2 times a day   apixaban 5 mg oral tablet: 1 tab(s) orally every 12 hours  aspirin 81 mg oral delayed release tablet: 1 tab(s) orally once a day  atorvastatin 40 mg oral tablet: 1 tab(s) orally once a day (at bedtime)  citalopram 20 mg oral tablet: 1 tab(s) orally once a day  furosemide 40 mg oral tablet: 1 tab(s) orally 2 times a day  Keppra 500 mg oral tablet: 1 tab(s) orally 2 times a day  metoprolol tartrate 50 mg oral tablet: 1 tab(s) orally 2 times a day  nystatin 100,000 units/g topical cream: 1 application topically 2 times a day  phenytoin 100 mg oral capsule: 1 tab(s) orally 3 times a day  sacubitril-valsartan 24 mg-26 mg oral tablet: 1 tab(s) orally 2 times a day

## 2023-02-16 LAB
ALBUMIN SERPL ELPH-MCNC: 4.4 G/DL — SIGNIFICANT CHANGE UP (ref 3.5–5.2)
ALP SERPL-CCNC: 397 U/L — HIGH (ref 30–115)
ALT FLD-CCNC: 52 U/L — HIGH (ref 0–41)
ANION GAP SERPL CALC-SCNC: 9 MMOL/L — SIGNIFICANT CHANGE UP (ref 7–14)
AST SERPL-CCNC: 39 U/L — SIGNIFICANT CHANGE UP (ref 0–41)
BASOPHILS # BLD AUTO: 0.13 K/UL — SIGNIFICANT CHANGE UP (ref 0–0.2)
BASOPHILS NFR BLD AUTO: 1.3 % — HIGH (ref 0–1)
BILIRUB SERPL-MCNC: 0.5 MG/DL — SIGNIFICANT CHANGE UP (ref 0.2–1.2)
BUN SERPL-MCNC: 24 MG/DL — HIGH (ref 10–20)
CALCIUM SERPL-MCNC: 9.3 MG/DL — SIGNIFICANT CHANGE UP (ref 8.4–10.4)
CHLORIDE SERPL-SCNC: 96 MMOL/L — LOW (ref 98–110)
CO2 SERPL-SCNC: 31 MMOL/L — SIGNIFICANT CHANGE UP (ref 17–32)
CREAT SERPL-MCNC: 1.2 MG/DL — SIGNIFICANT CHANGE UP (ref 0.7–1.5)
EGFR: 47 ML/MIN/1.73M2 — LOW
EOSINOPHIL # BLD AUTO: 3.65 K/UL — HIGH (ref 0–0.7)
EOSINOPHIL NFR BLD AUTO: 37.4 % — HIGH (ref 0–8)
GLUCOSE SERPL-MCNC: 90 MG/DL — SIGNIFICANT CHANGE UP (ref 70–99)
HCT VFR BLD CALC: 41.5 % — SIGNIFICANT CHANGE UP (ref 37–47)
HGB BLD-MCNC: 13.6 G/DL — SIGNIFICANT CHANGE UP (ref 12–16)
IMM GRANULOCYTES NFR BLD AUTO: 0.4 % — HIGH (ref 0.1–0.3)
LYMPHOCYTES # BLD AUTO: 1.72 K/UL — SIGNIFICANT CHANGE UP (ref 1.2–3.4)
LYMPHOCYTES # BLD AUTO: 17.6 % — LOW (ref 20.5–51.1)
MAGNESIUM SERPL-MCNC: 2.1 MG/DL — SIGNIFICANT CHANGE UP (ref 1.8–2.4)
MCHC RBC-ENTMCNC: 28.7 PG — SIGNIFICANT CHANGE UP (ref 27–31)
MCHC RBC-ENTMCNC: 32.8 G/DL — SIGNIFICANT CHANGE UP (ref 32–37)
MCV RBC AUTO: 87.6 FL — SIGNIFICANT CHANGE UP (ref 81–99)
MONOCYTES # BLD AUTO: 0.71 K/UL — HIGH (ref 0.1–0.6)
MONOCYTES NFR BLD AUTO: 7.3 % — SIGNIFICANT CHANGE UP (ref 1.7–9.3)
NEUTROPHILS # BLD AUTO: 3.52 K/UL — SIGNIFICANT CHANGE UP (ref 1.4–6.5)
NEUTROPHILS NFR BLD AUTO: 36 % — LOW (ref 42.2–75.2)
NRBC # BLD: 0 /100 WBCS — SIGNIFICANT CHANGE UP (ref 0–0)
PLATELET # BLD AUTO: 173 K/UL — SIGNIFICANT CHANGE UP (ref 130–400)
POTASSIUM SERPL-MCNC: 3.7 MMOL/L — SIGNIFICANT CHANGE UP (ref 3.5–5)
POTASSIUM SERPL-SCNC: 3.7 MMOL/L — SIGNIFICANT CHANGE UP (ref 3.5–5)
PROT SERPL-MCNC: 7.1 G/DL — SIGNIFICANT CHANGE UP (ref 6–8)
RBC # BLD: 4.74 M/UL — SIGNIFICANT CHANGE UP (ref 4.2–5.4)
RBC # FLD: 15.8 % — HIGH (ref 11.5–14.5)
SODIUM SERPL-SCNC: 136 MMOL/L — SIGNIFICANT CHANGE UP (ref 135–146)
WBC # BLD: 9.77 K/UL — SIGNIFICANT CHANGE UP (ref 4.8–10.8)
WBC # FLD AUTO: 9.77 K/UL — SIGNIFICANT CHANGE UP (ref 4.8–10.8)

## 2023-02-16 PROCEDURE — 99232 SBSQ HOSP IP/OBS MODERATE 35: CPT

## 2023-02-16 RX ADMIN — ATORVASTATIN CALCIUM 40 MILLIGRAM(S): 80 TABLET, FILM COATED ORAL at 21:03

## 2023-02-16 RX ADMIN — APIXABAN 5 MILLIGRAM(S): 2.5 TABLET, FILM COATED ORAL at 05:21

## 2023-02-16 RX ADMIN — APIXABAN 5 MILLIGRAM(S): 2.5 TABLET, FILM COATED ORAL at 17:06

## 2023-02-16 RX ADMIN — Medication 100 MILLIGRAM(S): at 21:02

## 2023-02-16 RX ADMIN — Medication 50 MILLIGRAM(S): at 17:06

## 2023-02-16 RX ADMIN — Medication 40 MILLIGRAM(S): at 14:30

## 2023-02-16 RX ADMIN — Medication 81 MILLIGRAM(S): at 11:46

## 2023-02-16 RX ADMIN — LEVETIRACETAM 500 MILLIGRAM(S): 250 TABLET, FILM COATED ORAL at 05:22

## 2023-02-16 RX ADMIN — Medication 100 MILLIGRAM(S): at 14:30

## 2023-02-16 RX ADMIN — LEVETIRACETAM 500 MILLIGRAM(S): 250 TABLET, FILM COATED ORAL at 17:06

## 2023-02-16 RX ADMIN — Medication 50 MILLIGRAM(S): at 05:22

## 2023-02-16 RX ADMIN — Medication 40 MILLIGRAM(S): at 06:11

## 2023-02-16 RX ADMIN — CITALOPRAM 20 MILLIGRAM(S): 10 TABLET, FILM COATED ORAL at 11:46

## 2023-02-16 RX ADMIN — Medication 100 MILLIGRAM(S): at 05:22

## 2023-02-16 NOTE — PROGRESS NOTE ADULT - ASSESSMENT
HOUSTON KING is a 77y Female with a history significant for  htn, AF s/p dccv, chronic HFrEF (EF 28%), seizure disorder with h/o CVA who p/w sob and hypoxia at [facility] (where patient had been since her discharge from [facility] on 1/26/23 (after 20+ day hospitalization).  In the ED patient was febrile 100.6 and admitted to [facility] for acute on chronic HFrEF exacerbation.  During prior hospitalization antihypertensive and diuretic medications were held 2/2 hypotension and slowly restarted prior to dc as well as to be reassessed as outpatient.    Acute hypoxic respiratory failure   Acute on chronic HFrEF with suspected pneumonia (HCAP)  - Discontinued cefepime s/p 5 day course  - change IV lasix to PO as pt euveolmic.  - repeat echo shows improvement in EF from 28:->35-40%  -covid negative  -legionella negative  -mrsa neg  -procal 3.11 with crp 72  -aspiration precautions--sp/swallow diet modification recomm    Chronic HFrEF  - likley systolic failure due to RVR  - EF recovering  - cont asa, statin, entresto, eliquis 5 mg bid, metoprolol 50 mg bid    Transaminitis  - unclear etiology  - elevated ggt  - denies RUQ pain, bilirubin normal    CVA   seizure  -continue asa, statin and eliquis 5 mg bid for stroke prevention for AF  -cont keppra and dilantin--monitor levels    Paroxysmal  AF  - c/w metoprolol, eliquis    DVT/GI ppx  guarded prognosis    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   Disposition: SNF

## 2023-02-16 NOTE — PROGRESS NOTE ADULT - SUBJECTIVE AND OBJECTIVE BOX
HOUSTON KING  77y  Female      Patient is a 77y old  Female who presents with a chief complaint of Shortness of breath      INTERVAL HPI/OVERNIGHT EVENTS:      ******************************* REVIEW OF SYSTEMS:**********************************************    All other review of systems negative    *********************** VITALS ******************************************    T(F): 96.9 (02-16-23 @ 13:00)  HR: 69 (02-16-23 @ 13:00) (65 - 74)  BP: 97/50 (02-16-23 @ 13:00) (97/50 - 126/55)  RR: 20 (02-16-23 @ 13:00) (18 - 20)  SpO2: --            ******************************** PHYSICAL EXAM:**************************************************  GENERAL: NAD    PSYCH: no agitation, baseline mentation  HEENT:     NERVOUS SYSTEM:  Alert & Oriented X3,     PULMONARY: SUSANNA, CTA    CARDIOVASCULAR: S1S2 RRR    GI: Soft, NT, ND; BS present.    EXTREMITIES:  2+ Peripheral Pulses, No clubbing, cyanosis, or edema    LYMPH: No lymphadenopathy noted    SKIN: No rashes or lesions      **************************** LABS *******************************************************                          13.6   9.77  )-----------( 173      ( 16 Feb 2023 06:55 )             41.5     02-16    136  |  96<L>  |  24<H>  ----------------------------<  90  3.7   |  31  |  1.2    Ca    9.3      16 Feb 2023 06:55  Mg     2.1     02-16    TPro  7.1  /  Alb  4.4  /  TBili  0.5  /  DBili  x   /  AST  39  /  ALT  52<H>  /  AlkPhos  397<H>  02-16          Lactate Trend  02-13 @ 11:18 Lactate:2.0         CAPILLARY BLOOD GLUCOSE              **************************Active Medications *******************************************  No Known Allergies      acetaminophen     Tablet .. 650 milliGRAM(s) Oral every 6 hours PRN  apixaban 5 milliGRAM(s) Oral every 12 hours  aspirin enteric coated 81 milliGRAM(s) Oral daily  atorvastatin 40 milliGRAM(s) Oral at bedtime  citalopram 20 milliGRAM(s) Oral daily  furosemide    Tablet 40 milliGRAM(s) Oral two times a day  levETIRAcetam 500 milliGRAM(s) Oral two times a day  melatonin 3 milliGRAM(s) Oral at bedtime PRN  metoprolol tartrate 50 milliGRAM(s) Oral two times a day  phenytoin   Capsule 100 milliGRAM(s) Oral three times a day      ***************************************************  RADIOLOGY & ADDITIONAL TESTS:    Imaging Personally Reviewed:  [ ] YES  [ ] NO    HEALTH ISSUES - PROBLEM Dx:

## 2023-02-17 VITALS — SYSTOLIC BLOOD PRESSURE: 130 MMHG | DIASTOLIC BLOOD PRESSURE: 59 MMHG | HEART RATE: 67 BPM

## 2023-02-17 LAB
ALBUMIN SERPL ELPH-MCNC: 4.1 G/DL — SIGNIFICANT CHANGE UP (ref 3.5–5.2)
ALP SERPL-CCNC: 363 U/L — HIGH (ref 30–115)
ALT FLD-CCNC: 43 U/L — HIGH (ref 0–41)
ANION GAP SERPL CALC-SCNC: 12 MMOL/L — SIGNIFICANT CHANGE UP (ref 7–14)
AST SERPL-CCNC: 32 U/L — SIGNIFICANT CHANGE UP (ref 0–41)
BASOPHILS # BLD AUTO: 0.13 K/UL — SIGNIFICANT CHANGE UP (ref 0–0.2)
BASOPHILS NFR BLD AUTO: 1.6 % — HIGH (ref 0–1)
BILIRUB SERPL-MCNC: 0.5 MG/DL — SIGNIFICANT CHANGE UP (ref 0.2–1.2)
BUN SERPL-MCNC: 24 MG/DL — HIGH (ref 10–20)
CALCIUM SERPL-MCNC: 9.6 MG/DL — SIGNIFICANT CHANGE UP (ref 8.4–10.5)
CHLORIDE SERPL-SCNC: 92 MMOL/L — LOW (ref 98–110)
CO2 SERPL-SCNC: 30 MMOL/L — SIGNIFICANT CHANGE UP (ref 17–32)
CREAT SERPL-MCNC: 1.2 MG/DL — SIGNIFICANT CHANGE UP (ref 0.7–1.5)
EGFR: 47 ML/MIN/1.73M2 — LOW
EOSINOPHIL # BLD AUTO: 2.66 K/UL — HIGH (ref 0–0.7)
EOSINOPHIL NFR BLD AUTO: 32.2 % — HIGH (ref 0–8)
GLUCOSE SERPL-MCNC: 90 MG/DL — SIGNIFICANT CHANGE UP (ref 70–99)
HCT VFR BLD CALC: 41 % — SIGNIFICANT CHANGE UP (ref 37–47)
HGB BLD-MCNC: 13.3 G/DL — SIGNIFICANT CHANGE UP (ref 12–16)
IMM GRANULOCYTES NFR BLD AUTO: 1 % — HIGH (ref 0.1–0.3)
LYMPHOCYTES # BLD AUTO: 1.66 K/UL — SIGNIFICANT CHANGE UP (ref 1.2–3.4)
LYMPHOCYTES # BLD AUTO: 20.1 % — LOW (ref 20.5–51.1)
MAGNESIUM SERPL-MCNC: 2.2 MG/DL — SIGNIFICANT CHANGE UP (ref 1.8–2.4)
MCHC RBC-ENTMCNC: 28.7 PG — SIGNIFICANT CHANGE UP (ref 27–31)
MCHC RBC-ENTMCNC: 32.4 G/DL — SIGNIFICANT CHANGE UP (ref 32–37)
MCV RBC AUTO: 88.4 FL — SIGNIFICANT CHANGE UP (ref 81–99)
MONOCYTES # BLD AUTO: 0.72 K/UL — HIGH (ref 0.1–0.6)
MONOCYTES NFR BLD AUTO: 8.7 % — SIGNIFICANT CHANGE UP (ref 1.7–9.3)
NEUTROPHILS # BLD AUTO: 3.02 K/UL — SIGNIFICANT CHANGE UP (ref 1.4–6.5)
NEUTROPHILS NFR BLD AUTO: 36.4 % — LOW (ref 42.2–75.2)
NRBC # BLD: 0 /100 WBCS — SIGNIFICANT CHANGE UP (ref 0–0)
PLATELET # BLD AUTO: 201 K/UL — SIGNIFICANT CHANGE UP (ref 130–400)
POTASSIUM SERPL-MCNC: 4.1 MMOL/L — SIGNIFICANT CHANGE UP (ref 3.5–5)
POTASSIUM SERPL-SCNC: 4.1 MMOL/L — SIGNIFICANT CHANGE UP (ref 3.5–5)
PROT SERPL-MCNC: 7.3 G/DL — SIGNIFICANT CHANGE UP (ref 6–8)
RBC # BLD: 4.64 M/UL — SIGNIFICANT CHANGE UP (ref 4.2–5.4)
RBC # FLD: 15.7 % — HIGH (ref 11.5–14.5)
SODIUM SERPL-SCNC: 134 MMOL/L — LOW (ref 135–146)
WBC # BLD: 8.27 K/UL — SIGNIFICANT CHANGE UP (ref 4.8–10.8)
WBC # FLD AUTO: 8.27 K/UL — SIGNIFICANT CHANGE UP (ref 4.8–10.8)

## 2023-02-17 PROCEDURE — 99232 SBSQ HOSP IP/OBS MODERATE 35: CPT

## 2023-02-17 RX ORDER — FUROSEMIDE 40 MG
1 TABLET ORAL
Qty: 0 | Refills: 0 | DISCHARGE
Start: 2023-02-17

## 2023-02-17 RX ADMIN — Medication 100 MILLIGRAM(S): at 05:25

## 2023-02-17 RX ADMIN — Medication 50 MILLIGRAM(S): at 18:22

## 2023-02-17 RX ADMIN — Medication 40 MILLIGRAM(S): at 13:56

## 2023-02-17 RX ADMIN — LEVETIRACETAM 500 MILLIGRAM(S): 250 TABLET, FILM COATED ORAL at 18:21

## 2023-02-17 RX ADMIN — Medication 40 MILLIGRAM(S): at 05:47

## 2023-02-17 RX ADMIN — Medication 100 MILLIGRAM(S): at 13:55

## 2023-02-17 RX ADMIN — Medication 81 MILLIGRAM(S): at 12:30

## 2023-02-17 RX ADMIN — APIXABAN 5 MILLIGRAM(S): 2.5 TABLET, FILM COATED ORAL at 18:22

## 2023-02-17 RX ADMIN — LEVETIRACETAM 500 MILLIGRAM(S): 250 TABLET, FILM COATED ORAL at 05:24

## 2023-02-17 RX ADMIN — Medication 50 MILLIGRAM(S): at 05:25

## 2023-02-17 RX ADMIN — CITALOPRAM 20 MILLIGRAM(S): 10 TABLET, FILM COATED ORAL at 12:30

## 2023-02-17 RX ADMIN — APIXABAN 5 MILLIGRAM(S): 2.5 TABLET, FILM COATED ORAL at 05:24

## 2023-02-17 NOTE — DISCHARGE NOTE NURSING/CASE MANAGEMENT/SOCIAL WORK - PATIENT PORTAL LINK FT
You can access the FollowMyHealth Patient Portal offered by [facility] by registering at the following website: [email or web address]. By joining [facility]’s FollowMyHealth portal, you will also be able to view your health information using other applications (apps) compatible with our system.

## 2023-02-17 NOTE — PROGRESS NOTE ADULT - SUBJECTIVE AND OBJECTIVE BOX
INTERNAL MEDICINE PROGRESS NOTE  HOUSTON KING 77y Female  MRN#: [id number]     Hospital Day: 8d  Pt is currently admitted with the primary diagnosis of CHF    SUBJECTIVE  Overnight events     Subjective complaints  Patient was evaluated this morning. Reports no shortness of breath, wheezing, coughing, chest pain, or palpitations.  Pending placement to rehab in [place].  Pt reports that legs do not feel more swollen compared to her baseline.                                           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)  Paroxysmal atrial fibrillation  HF (heart failure)                                            ALLERGIES:  No Known Allergies    HOME MEDICATIONS  Home Medications:  apixaban 5 mg oral tablet: 1 tab(s) orally every 12 hours (09 Feb 2023 17:39)  aspirin 81 mg oral delayed release tablet: 1 tab(s) orally once a day (09 Feb 2023 17:39)  citalopram 20 mg oral tablet: 1 tab(s) orally once a day (09 Feb 2023 17:39)  Keppra 500 mg oral tablet: 1 tab(s) orally 2 times a day (09 Feb 2023 17:39)  metoprolol tartrate 50 mg oral tablet: 1 tab(s) orally 2 times a day (09 Feb 2023 17:39)  nystatin 100,000 units/g topical cream: 1 application topically 2 times a day (09 Feb 2023 17:39)  phenytoin 100 mg oral capsule: 1 tab(s) orally 3 times a day (09 Feb 2023 17:39)                         MEDICATIONS:  STANDING MEDICATIONS  apixaban 5 milliGRAM(s) Oral every 12 hours  aspirin enteric coated 81 milliGRAM(s) Oral daily  atorvastatin 40 milliGRAM(s) Oral at bedtime  citalopram 20 milliGRAM(s) Oral daily  furosemide    Tablet 40 milliGRAM(s) Oral two times a day  levETIRAcetam 500 milliGRAM(s) Oral two times a day  metoprolol tartrate 50 milliGRAM(s) Oral two times a day  phenytoin   Capsule 100 milliGRAM(s) Oral three times a day    PRN MEDICATIONS  acetaminophen     Tablet .. 650 milliGRAM(s) Oral every 6 hours PRN  melatonin 3 milliGRAM(s) Oral at bedtime PRN                                            ------------------------------------------------------------  VITAL SIGNS: Last 24 Hours  T(C): 36.2 (14 Feb 2023 05:00), Max: 36.2 (13 Feb 2023 20:00)  T(F): 97.1 (14 Feb 2023 05:00), Max: 97.1 (13 Feb 2023 20:00)  HR: 91 (14 Feb 2023 05:00) (80 - 92)  BP: 134/68 (14 Feb 2023 05:00) (108/59 - 134/68)  BP(mean): --  RR: 18 (14 Feb 2023 05:00) (18 - 18)  SpO2: 98% (14 Feb 2023 07:44) (98% - 98%)                                               LABS:                        13.5   9.76  )-----------( 169      ( 15 Feb 2023 07:55 )             41.6       02-15    138  |  96<L>  |  19  ----------------------------<  92  3.4<L>   |  34<H>  |  1.1    Ca    9.3      15 Feb 2023 07:55  Mg     2.1     02-15    TPro  6.5  /  Alb  3.6  /  TBili  0.6  /  DBili  x   /  AST  44<H>  /  ALT  55<H>  /  AlkPhos  406<H>  02-15                                      13.8   x     )-----------( 164      ( 14 Feb 2023 06:28 )             42.7     02-14    139  |  96<L>  |  20  ----------------------------<  102<H>  3.9   |  36<H>  |  1.2    Ca    9.2      14 Feb 2023 06:28  Mg     2.0     02-14    TPro  6.5  /  Alb  4.0  /  TBili  0.7  /  DBili  x   /  AST  68<H>  /  ALT  68<H>  /  AlkPhos  405<H>  02-14          Lactate, Blood: 2.0 mmol/L (02-13-23 @ 11:18)                                                      RADIOLOGY:    PHYSICAL EXAM:  GENERAL: NAD, lying in bed comfortably on room air  HEAD:  Atraumatic, Normocephalic  EYES: EOMI, PERRLA, conjunctiva and sclera clear  CHEST/LUNG: Clear to auscultation bilaterally; No rales, rhonchi, wheezing, or rubs. Unlabored respirations  HEART: Regular rate and rhythm; No murmurs, rubs, or gallops  ABDOMEN: Bowel Sounds present; Soft, nontender, nondistended  EXTREMITIES:  Nonpitting edema present BL LE. 2+ Peripheral Pulses, brisk capillary refill.  NERVOUS SYSTEM:  A&Ox3, no focal deficits   SKIN: No rashes or lesions                                       ASSESSMENT & PLAN  HOUSTON KING is a 77y Female with a history significant for  htn, AF s/p dccv, chronic HFrEF (EF 28%), seizure disorder with h/o CVA who p/w sob and hypoxia at [facility] (where patient had been since her discharge from [facility] on 1/26/23 (after 20+ day hospitalization).  In the ED patient was febrile 100.6 and admitted to [facility] for acute on chronic HFrEF exacerbation.  During prior hospitalization antihypertensive and diuretic medications were held 2/2 hypotension and slowly restarted prior to dc as well as to be reassessed as outpatient.    Acute hypoxic respiratory failure   Acute on chronic HFrEF with suspected pneumonia (HCAP)  - Discontinued cefepime s/p 5 day course  - change IV lasix to PO as pt euveolmic.  - repeat echo shows improvement in EF from 28:->35-40%  -covid negative  -legionella negative  -mrsa neg  -f/u RVP  -procal 3.11 with crp 72  -aspiration precautions--sp/swallow diet modification recomm    Chronic HFrEF  - likley systolic failure due to RVR  - EF recovering  - cont asa, statin, entresto, eliquis 5 mg bid, metoprolol 50 mg bid    Transaminitis  - unclear etiology  - elevated ggt  - denies RUQ pain, bilirubin normal    CVA   seizure  -continue asa, statin and eliquis 5 mg bid for stroke prevention for AF  -cont keppra and dilantin--monitor levels    Chronic paroxysm AF  - c/w metoprolol, eliquis    DVT/GI ppx  guarded prognosis    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

## 2023-02-17 NOTE — DISCHARGE NOTE NURSING/CASE MANAGEMENT/SOCIAL WORK - NSDCPEFALRISK_GEN_ALL_CORE
For information on Fall & Injury Prevention, visit: [email or web address] OR  [email or web address] OR  https://www.cdc.gov/steadi/patient.html

## 2023-02-17 NOTE — CHART NOTE - NSCHARTNOTEFT_GEN_A_CORE
Brief Nutrition Note: met with pt due to RD screen for length of stay, pt known to RD from previous admission, pt is currently stable for discharge and planned for d/c to SNF today. Pt on DASH/TLC diet, good PO intake % of meals, likes the food in-house. No nutrition interventions indicated at this time. If pt is not discharged will re-screen in 7-10 days.    or TEAMS

## 2023-02-17 NOTE — PROGRESS NOTE ADULT - PROVIDER SPECIALTY LIST ADULT
Internal Medicine
Internal Medicine
Hospitalist
Internal Medicine
Hospitalist
Hospitalist

## 2023-02-22 DIAGNOSIS — Z20.822 CONTACT WITH AND (SUSPECTED) EXPOSURE TO COVID-19: ICD-10-CM

## 2023-02-22 DIAGNOSIS — I69.320 APHASIA FOLLOWING CEREBRAL INFARCTION: ICD-10-CM

## 2023-02-22 DIAGNOSIS — J18.9 PNEUMONIA, UNSPECIFIED ORGANISM: ICD-10-CM

## 2023-02-22 DIAGNOSIS — Z79.01 LONG TERM (CURRENT) USE OF ANTICOAGULANTS: ICD-10-CM

## 2023-02-22 DIAGNOSIS — I50.23 ACUTE ON CHRONIC SYSTOLIC (CONGESTIVE) HEART FAILURE: ICD-10-CM

## 2023-02-22 DIAGNOSIS — I13.0 HYPERTENSIVE HEART AND CHRONIC KIDNEY DISEASE WITH HEART FAILURE AND STAGE 1 THROUGH STAGE 4 CHRONIC KIDNEY DISEASE, OR UNSPECIFIED CHRONIC KIDNEY DISEASE: ICD-10-CM

## 2023-02-22 DIAGNOSIS — N18.30 CHRONIC KIDNEY DISEASE, STAGE 3 UNSPECIFIED: ICD-10-CM

## 2023-02-22 DIAGNOSIS — G40.909 EPILEPSY, UNSPECIFIED, NOT INTRACTABLE, WITHOUT STATUS EPILEPTICUS: ICD-10-CM

## 2023-02-22 DIAGNOSIS — I69.398 OTHER SEQUELAE OF CEREBRAL INFARCTION: ICD-10-CM

## 2023-02-22 DIAGNOSIS — J96.01 ACUTE RESPIRATORY FAILURE WITH HYPOXIA: ICD-10-CM

## 2023-02-22 DIAGNOSIS — I48.0 PAROXYSMAL ATRIAL FIBRILLATION: ICD-10-CM

## 2023-05-23 NOTE — PHYSICAL THERAPY INITIAL EVALUATION ADULT - GAIT DEVIATIONS NOTED, PT EVAL
Received a refill request for levothyroxine     According to Laurie's notes on 4.04.23  -- Continue the following medications:             -- Levothyroxine 50 mcg daily  Patient to repeat thyroid labs yearly for monitoring    Component      Latest Ref Rng & Units 5/19/2023   TSH      0.350 - 5.000 mcUnits/mL 3.937     Script sent    FU 7.14.23   decreased step length

## 2023-09-01 ENCOUNTER — NEW PATIENT COMPREHENSIVE (OUTPATIENT)
Dept: URBAN - METROPOLITAN AREA CLINIC 10 | Facility: CLINIC | Age: 78
End: 2023-09-01

## 2023-09-01 DIAGNOSIS — H52.4: ICD-10-CM

## 2023-09-01 DIAGNOSIS — H35.371: ICD-10-CM

## 2023-09-01 DIAGNOSIS — H52.03: ICD-10-CM

## 2023-09-01 DIAGNOSIS — H04.123: ICD-10-CM

## 2023-09-01 DIAGNOSIS — Z13.5: ICD-10-CM

## 2023-09-01 DIAGNOSIS — H35.3132: ICD-10-CM

## 2023-09-01 DIAGNOSIS — H25.813: ICD-10-CM

## 2023-09-01 PROCEDURE — 92134 CPTRZ OPH DX IMG PST SGM RTA: CPT

## 2023-09-01 PROCEDURE — MISCOPTOS MISCELLANEOUS, OPTOS

## 2023-09-01 PROCEDURE — 92015 DETERMINE REFRACTIVE STATE: CPT

## 2023-09-01 PROCEDURE — 92004 COMPRE OPH EXAM NEW PT 1/>: CPT

## 2023-09-01 ASSESSMENT — VISUAL ACUITY
OS_SC: 20/70+1
OS_PH: 20/70-1
OD_SC: 20/80
OU_CC: J6

## 2023-09-01 ASSESSMENT — KERATOMETRY
OS_AXISANGLE2_DEGREES: 10
OS_K2POWER_DIOPTERS: 48.50
OS_K1POWER_DIOPTERS: 47.50
OD_K2POWER_DIOPTERS: 46.25
OS_AXISANGLE_DEGREES: 100
OD_K1POWER_DIOPTERS: 45.25
OD_AXISANGLE2_DEGREES: 35
OD_AXISANGLE_DEGREES: 125

## 2023-09-01 ASSESSMENT — TONOMETRY
OS_IOP_MMHG: 16
OD_IOP_MMHG: 15

## 2024-04-03 NOTE — PROGRESS NOTE ADULT - PROVIDER SPECIALTY LIST ADULT
Advance Care Planning   Healthcare Decision Maker:    Primary Decision Maker: Rober Mejía Jr - Parent - [phone]    Secondary Decision Maker: Lillian Mejía - Aunt/Uncle - [phone]    Today we documented Decision Maker(s) consistent with Legal Next of Kin hierarchy.    Electronically signed by LATHA Everett on 4/3/2024 at 2:37 PM    Internal Medicine

## 2024-06-15 NOTE — SWALLOW BEDSIDE ASSESSMENT ADULT - SWALLOW EVAL: BUCCAL STRENGTH AND MOBILITY
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

## 2025-05-02 NOTE — SWALLOW BEDSIDE ASSESSMENT ADULT - DIET PRIOR TO ADMI
Last office visit: 6/10/2024 for consult  Follow-up recommended: as needed   Last refill date:   Norethindrone Acet-Ethinyl Est (JUNEL 1.5/30) 1.5-30 MG-MCG Oral Tab 112 tablet 3 6/24/2024 6/24/2025   Sig:   Take 1 tablet by mouth daily. Take one active pill daily for continuous therapy and skip placebo week.     Next appt.: 5/28/2025    Exception refill request sent per protocol.  
Regular with thin liquids